# Patient Record
Sex: FEMALE | Race: WHITE | Employment: FULL TIME | URBAN - METROPOLITAN AREA
[De-identification: names, ages, dates, MRNs, and addresses within clinical notes are randomized per-mention and may not be internally consistent; named-entity substitution may affect disease eponyms.]

---

## 2021-03-23 ENCOUNTER — TELEPHONE (OUTPATIENT)
Dept: TRANSPLANT | Facility: CLINIC | Age: 46
End: 2021-03-23

## 2021-03-23 DIAGNOSIS — Z00.5 TRANSPLANT DONOR EVALUATION: Primary | ICD-10-CM

## 2021-03-23 NOTE — TELEPHONE ENCOUNTER
Spoke with Zora. Will send her list of local  clinics and admin will sched for Phase 1 labs. Prep instructions given.Orders in Epic.

## 2021-04-04 ENCOUNTER — HEALTH MAINTENANCE LETTER (OUTPATIENT)
Age: 46
End: 2021-04-04

## 2021-04-09 ENCOUNTER — ALLIED HEALTH/NURSE VISIT (OUTPATIENT)
Dept: NURSING | Facility: CLINIC | Age: 46
End: 2021-04-09

## 2021-04-09 VITALS
BODY MASS INDEX: 34.12 KG/M2 | HEIGHT: 67 IN | DIASTOLIC BLOOD PRESSURE: 76 MMHG | SYSTOLIC BLOOD PRESSURE: 120 MMHG | WEIGHT: 217.4 LBS

## 2021-04-09 DIAGNOSIS — Z00.5 TRANSPLANT DONOR EVALUATION: Primary | ICD-10-CM

## 2021-04-09 DIAGNOSIS — Z00.5 TRANSPLANT DONOR EVALUATION: ICD-10-CM

## 2021-04-09 LAB
ABO + RH BLD: NORMAL
ABO + RH BLD: NORMAL
ALBUMIN UR-MCNC: NEGATIVE MG/DL
APPEARANCE UR: CLEAR
BACTERIA #/AREA URNS HPF: ABNORMAL /HPF
BILIRUB UR QL STRIP: NEGATIVE
COLOR UR AUTO: YELLOW
CREAT SERPL-MCNC: 0.93 MG/DL (ref 0.52–1.04)
GFR SERPL CREATININE-BSD FRML MDRD: 74 ML/MIN/{1.73_M2}
GLUCOSE SERPL-MCNC: 99 MG/DL (ref 70–99)
GLUCOSE UR STRIP-MCNC: NEGATIVE MG/DL
HGB BLD-MCNC: 13.8 G/DL (ref 11.7–15.7)
HGB UR QL STRIP: NEGATIVE
KETONES UR STRIP-MCNC: NEGATIVE MG/DL
LEUKOCYTE ESTERASE UR QL STRIP: NEGATIVE
MUCOUS THREADS #/AREA URNS LPF: PRESENT /LPF
NITRATE UR QL: NEGATIVE
NON-SQ EPI CELLS #/AREA URNS LPF: ABNORMAL /LPF
PH UR STRIP: 6.5 PH (ref 5–7)
PROT UR-MCNC: 0.12 G/L
PROT/CREAT 24H UR: 0.07 G/G CR (ref 0–0.2)
RBC #/AREA URNS AUTO: ABNORMAL /HPF
SOURCE: ABNORMAL
SP GR UR STRIP: 1.02 (ref 1–1.03)
SPECIMEN EXP DATE BLD: NORMAL
UROBILINOGEN UR STRIP-ACNC: 0.2 EU/DL (ref 0.2–1)
WBC #/AREA URNS AUTO: ABNORMAL /HPF

## 2021-04-09 PROCEDURE — 85018 HEMOGLOBIN: CPT | Performed by: SURGERY

## 2021-04-09 PROCEDURE — 81001 URINALYSIS AUTO W/SCOPE: CPT | Performed by: SURGERY

## 2021-04-09 PROCEDURE — 86900 BLOOD TYPING SEROLOGIC ABO: CPT | Performed by: SURGERY

## 2021-04-09 PROCEDURE — 82043 UR ALBUMIN QUANTITATIVE: CPT | Performed by: SURGERY

## 2021-04-09 PROCEDURE — 82947 ASSAY GLUCOSE BLOOD QUANT: CPT | Performed by: SURGERY

## 2021-04-09 PROCEDURE — 84156 ASSAY OF PROTEIN URINE: CPT | Performed by: SURGERY

## 2021-04-09 PROCEDURE — 82565 ASSAY OF CREATININE: CPT | Performed by: SURGERY

## 2021-04-09 PROCEDURE — 36415 COLL VENOUS BLD VENIPUNCTURE: CPT | Performed by: SURGERY

## 2021-04-09 ASSESSMENT — MIFFLIN-ST. JEOR: SCORE: 1658.75

## 2021-04-09 NOTE — NURSING NOTE
I recorded a height, weight and three blood pressures 15 minutes apart.  I validated with the patient they have already had their lab appointment, have one today or one in the future. Loretta White RN

## 2021-04-10 LAB
CREAT UR-MCNC: 178 MG/DL
MICROALBUMIN UR-MCNC: 6 MG/L
MICROALBUMIN/CREAT UR: 3.53 MG/G CR (ref 0–25)

## 2021-04-19 ENCOUNTER — TELEPHONE (OUTPATIENT)
Dept: TRANSPLANT | Facility: CLINIC | Age: 46
End: 2021-04-19

## 2021-04-19 NOTE — TELEPHONE ENCOUNTER
Please sched for kidney donor eval for 5/13/21 slot 1. Is direct/Deshaun W is coordinator. Sched Iohexol and eval labs for 5/13.Will do COVID test at outside facility. Has Michael Beltran put in eval orders.

## 2021-05-06 ENCOUNTER — TRANSFERRED RECORDS (OUTPATIENT)
Dept: HEALTH INFORMATION MANAGEMENT | Facility: CLINIC | Age: 46
End: 2021-05-06

## 2021-05-11 DIAGNOSIS — Z00.5 TRANSPLANT DONOR EVALUATION: ICD-10-CM

## 2021-05-11 LAB
LABORATORY COMMENT REPORT: NORMAL
SARS-COV-2 RNA RESP QL NAA+PROBE: NEGATIVE
SARS-COV-2 RNA RESP QL NAA+PROBE: NORMAL
SPECIMEN SOURCE: NORMAL
SPECIMEN SOURCE: NORMAL

## 2021-05-11 PROCEDURE — U0003 INFECTIOUS AGENT DETECTION BY NUCLEIC ACID (DNA OR RNA); SEVERE ACUTE RESPIRATORY SYNDROME CORONAVIRUS 2 (SARS-COV-2) (CORONAVIRUS DISEASE [COVID-19]), AMPLIFIED PROBE TECHNIQUE, MAKING USE OF HIGH THROUGHPUT TECHNOLOGIES AS DESCRIBED BY CMS-2020-01-R: HCPCS | Performed by: SURGERY

## 2021-05-11 PROCEDURE — U0005 INFEC AGEN DETEC AMPLI PROBE: HCPCS | Performed by: SURGERY

## 2021-05-11 NOTE — PROGRESS NOTES
Psychosocial Evaluation  Living Organ Donation per OPTN Policy 14.1.A  Organ Type: related, kidney  Presenting Information:  Ms. Zora López presents to the [facility] to complete a psychosocial evaluation since she is interested in becoming a kidney donor for her father, Mr. Yaron Hodge, age 71.    PERSONAL BACKGROUND:  Current Living Situation: Ms. López lives in [place], [place] with her spouse and their two children.    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  She earned her graduate degree.  She denies any financial concerns related to kidney donation.    Health Insurance Status: Ms. López has health care insurance.    Family History: Ms. López is .  Her spouse is Rodo López.  They have 2 sons, ages 14 and 8 years old.      General Health: Ms. López reports that she enjoys good health.  She exercises about 1 time per week. She used to be a runner.  She does have a health care directive.    Mental Health: Denies any past or present treatment for mental health issues.  Denies any need to see a counselor or therapist.  Denies any past suicidal ideation, plans, or past attempts.  Denies any use of psychotropic medications.  Denies any past history of hospitalization for psychiatric illness.    Alcohol and Drug Use/Abuse/Dependency: Denies any past history of abuse or dependency on alcohol or illicit drugs. Denies any current use of street drugs, including marijuana.  Denies any past history of negative consequences of use of alcohol or drugs such as a DUI, relationship problems, or problems with work or home life.   Typically, she drinks 3 to 4 glasses wine or beer per week.    Cigarette Use: Ms. López denies any smoking history.    Legal: Ms. López denies any legal issues.    Coping with major surgery/associated stress: Ms. López says  that she is prone to worry and she deals with it by making lists.  She looks at what she needs to do and starts moving down the list.  She is a planner.  For fun, she likes to travel and spend time with her family, shop, camp, and spend time at the cabin with her parents.    Support System: Her main support is her , who is hear today.  She is also very close to her parents and has many supportive friends.    DONOR SPECIFIC INFORMATION:  Relationship to Recipient: Ms. López wants to donate a kidney to her father, Mr. Yaron Hodge, age 71.    Decision Process/Motivation to Donate: Ms. López tells me that she is motivated to donate a kidney to her father out of love for him and concern for his health.  She wants to see him live a better quality of life free from dialysis.  She wants to see him enjoy time with his grandchildren.  She denies feeling any pressure or coercion to be a donor.  She denies being offered any form of payment to be a donor.    PREPARATION FOR DONATION, RECOVERY, AND POTENTIAL SHORT-LONG-TERM OUTCOMES:  Understanding of the Living Donation Process:   We discussed the role of the donor  and Independent Donor Advocate.  Short and long term medical and psychosocial risks to both, donor and recipient were reviewed and Ms. López voices these risks.  High risk behaviors as defined by [place] Public Health Services (PHS) that have potential to increase risk of disease transmission were reviewed and she denies any high risk behaviors. Post surgical restrictions (2 weeks no driving, 6 weeks no lifting over 10 lbs) were reviewed and she appears capable of adhering to the post surgical requirements. The need for a caregiver was discussed and her  will provide care to her .  The risk of poor psychosocial outcome including problems with body image, post-surgery depression or anxiety, or feelings of emotional distress or bereavement if recipient experiences any recurrent disease,  poor outcome or death was reviewed.  Additionally, potential financial implications, including the risk of having difficulty obtaining health care insurance, life insurance, disability insurance, or long term care insurance were reviewed, as were available donor grants to assist with donor related expenses.      We also discussed some unique issues that arise with paired kidney donation, which include the uncertainty of the timing and the importance of having a employment situation and support system that is able to provide sustained support and flexibility.    Ms. López appears capable of understanding this information and making an informed medical decision.    Impressions/Recommendations:   Ms. López  is highly motivated to donate kidney  to her father, Mr. Yaron Hodge, age 71.  Her decision to donate is free of inducement, coercion, or other undue pressure.   Her housing, finances and employment are stable.  No current/active mental health or chemical abuse issues were identified.  The need for a caregiver was reviewed and she is able to identify a plan to meet her post operative care needs.  Ms. López appears capable of making an informed medical decision.  No psychosocial contraindications to living organ donation were identified and  I support Ms. López s desire to donate a kidney to her father, Mr. Yaron Hodge, age 71.         Contact Information:   ARTEMIO Delgadillo, [facility]  Clinical  and Independent Donor Advocate  [facility]  Pager:  [phone]  Direct:  [phone]    Time Spent: 60 minutes      Living Kidney Donor Consent per OPTN Policy 14.3.A for Independent Living Donor Advocate (ASYA)    Written assurance has been obtained from the potential donor that he/she:   Is willing to donate  Is free from inducement and coercion  Has been informed that the he/she may decline to donate at any time  Has been informed that transplant  centers must:   A) Offer donors an opportunity to discontinue the donor consent or evaluation process in a way that is protected and confidential  B) Provide an independent living donor advocate (ASYA) to assist the potential donor during this process    The following was presented to the potential donor in a language in which the potential donor is able to engage in meaningful dialogue:   Education and instruction about all phases of the living donation process including:   Consent  Medical and psychosocial evaluation  Information about the surgical procedure  Pre and post operative care  Benefits of post operative follow up  Disclosure that the [facility] will take all reasonable precautions to provide confidentiality for the donor/recipient  Disclosure that it is a federal crime for any person to knowingly acquire, obtain or otherwise transfer any human organ for valuable consideration  Disclosure that the [facility] must provide an independent living donor advocate (ASYA)  Disclosure that health information obtained during the evaluation is subject to the same regulations as all records and could reveal conditions that must be reported to local, state, or federal public health authorities  Disclosure that the [facility] is required to report living donor follow up information at 6 months, 1 year, and 2 years, and that the potential donor must commit to post operative follow up testing coordinated by the [facility]    Disclosure has been provided that these risks may be transient or permanent & include but are not limited to:  Potential psychosocial risks:  Problems with body image  Post-surgery depression or anxiety  Feelings of emotional distress or bereavement if recipient experiences any recurrent disease or in the event of the recipient s death  Impact of donation on the donor s lifestyle    Potential financial impacts:  Personal expenses of travel, housing, , lost wages  related to donation might not be reimbursed. However, resources may be available to defray some donation-related expenses   Need for life-long follow up at the donor s expense  Loss of employment or income  Negative impact on the ability to obtain future employment  Negative impact on the ability to obtain, maintain, or afford health, disability, and life insurance  Future health problems experienced by living donors following donation may not be covered by the recipient s insurance    Contact Information:  ARTEMIO Delgadillo, [facility]  Clinical  and Independent Donor Advocate  [facility]  Pager:  [phone]  Direct:  [phone]    Time Spent: 60 minutes

## 2021-05-12 ENCOUNTER — TRANSFERRED RECORDS (OUTPATIENT)
Dept: HEALTH INFORMATION MANAGEMENT | Facility: CLINIC | Age: 46
End: 2021-05-12

## 2021-05-13 ENCOUNTER — ANCILLARY PROCEDURE (OUTPATIENT)
Dept: GENERAL RADIOLOGY | Facility: CLINIC | Age: 46
End: 2021-05-13
Attending: SURGERY

## 2021-05-13 ENCOUNTER — APPOINTMENT (OUTPATIENT)
Dept: TRANSPLANT | Facility: CLINIC | Age: 46
End: 2021-05-13
Attending: SURGERY

## 2021-05-13 ENCOUNTER — OFFICE VISIT (OUTPATIENT)
Dept: TRANSPLANT | Facility: CLINIC | Age: 46
End: 2021-05-13
Attending: SURGERY
Payer: COMMERCIAL

## 2021-05-13 ENCOUNTER — OFFICE VISIT (OUTPATIENT)
Dept: NEPHROLOGY | Facility: CLINIC | Age: 46
End: 2021-05-13
Attending: SURGERY
Payer: COMMERCIAL

## 2021-05-13 ENCOUNTER — ALLIED HEALTH/NURSE VISIT (OUTPATIENT)
Dept: TRANSPLANT | Facility: CLINIC | Age: 46
End: 2021-05-13
Attending: SURGERY

## 2021-05-13 ENCOUNTER — ANCILLARY PROCEDURE (OUTPATIENT)
Dept: CT IMAGING | Facility: CLINIC | Age: 46
End: 2021-05-13
Attending: SURGERY
Payer: COMMERCIAL

## 2021-05-13 ENCOUNTER — DOCUMENTATION ONLY (OUTPATIENT)
Dept: TRANSPLANT | Facility: CLINIC | Age: 46
End: 2021-05-13

## 2021-05-13 ENCOUNTER — INFUSION THERAPY VISIT (OUTPATIENT)
Dept: INFUSION THERAPY | Facility: CLINIC | Age: 46
End: 2021-05-13
Attending: INTERNAL MEDICINE

## 2021-05-13 VITALS
BODY MASS INDEX: 32.57 KG/M2 | OXYGEN SATURATION: 100 % | WEIGHT: 214.9 LBS | DIASTOLIC BLOOD PRESSURE: 78 MMHG | HEART RATE: 86 BPM | SYSTOLIC BLOOD PRESSURE: 113 MMHG | HEIGHT: 68 IN

## 2021-05-13 DIAGNOSIS — Z00.5 TRANSPLANT DONOR EVALUATION: ICD-10-CM

## 2021-05-13 DIAGNOSIS — Z52.4 DONOR OF KIDNEY FOR TRANSPLANT: Primary | ICD-10-CM

## 2021-05-13 DIAGNOSIS — Z00.5 TRANSPLANT DONOR EVALUATION: Primary | ICD-10-CM

## 2021-05-13 DIAGNOSIS — E66.811 OBESITY (BMI 30.0-34.9): ICD-10-CM

## 2021-05-13 LAB
ABO + RH BLD: NORMAL
ABO + RH BLD: NORMAL
ALBUMIN SERPL-MCNC: 3.8 G/DL (ref 3.4–5)
ALP SERPL-CCNC: 81 U/L (ref 40–150)
ALT SERPL W P-5'-P-CCNC: 18 U/L (ref 0–50)
ANION GAP SERPL CALCULATED.3IONS-SCNC: 3 MMOL/L (ref 3–14)
APTT PPP: 32 SEC (ref 22–37)
AST SERPL W P-5'-P-CCNC: 14 U/L (ref 0–45)
BILIRUB DIRECT SERPL-MCNC: 0.2 MG/DL (ref 0–0.2)
BILIRUB SERPL-MCNC: 0.7 MG/DL (ref 0.2–1.3)
BLD GP AB SCN SERPL QL: NORMAL
BLOOD BANK CMNT PATIENT-IMP: NORMAL
BUN SERPL-MCNC: 12 MG/DL (ref 7–30)
CALCIUM SERPL-MCNC: 8.6 MG/DL (ref 8.5–10.1)
CHLORIDE SERPL-SCNC: 107 MMOL/L (ref 94–109)
CHOLEST SERPL-MCNC: 193 MG/DL
CO2 SERPL-SCNC: 30 MMOL/L (ref 20–32)
CREAT SERPL-MCNC: 0.88 MG/DL (ref 0.52–1.04)
ERYTHROCYTE [DISTWIDTH] IN BLOOD BY AUTOMATED COUNT: 11.9 % (ref 10–15)
GFR SERPL CREATININE-BSD FRML MDRD: 79 ML/MIN/{1.73_M2}
GLUCOSE SERPL-MCNC: 96 MG/DL (ref 70–99)
HBA1C MFR BLD: 5.2 % (ref 0–5.6)
HBV CORE AB SERPL QL IA: NONREACTIVE
HBV SURFACE AB SERPL IA-ACNC: 0 M[IU]/ML
HBV SURFACE AG SERPL QL IA: NONREACTIVE
HCT VFR BLD AUTO: 42.9 % (ref 35–47)
HCV AB SERPL QL IA: NONREACTIVE
HDLC SERPL-MCNC: 58 MG/DL
HGB BLD-MCNC: 14.1 G/DL (ref 11.7–15.7)
HIV 1+2 AB+HIV1 P24 AG SERPL QL IA: NONREACTIVE
INR PPP: 1 (ref 0.86–1.14)
LDLC SERPL CALC-MCNC: 120 MG/DL
MCH RBC QN AUTO: 30.6 PG (ref 26.5–33)
MCHC RBC AUTO-ENTMCNC: 32.9 G/DL (ref 31.5–36.5)
MCV RBC AUTO: 93 FL (ref 78–100)
NONHDLC SERPL-MCNC: 135 MG/DL
PHOSPHATE SERPL-MCNC: 2.7 MG/DL (ref 2.5–4.5)
PLATELET # BLD AUTO: 258 10E9/L (ref 150–450)
POTASSIUM SERPL-SCNC: 4.1 MMOL/L (ref 3.4–5.3)
PROT SERPL-MCNC: 7.7 G/DL (ref 6.8–8.8)
RBC # BLD AUTO: 4.61 10E12/L (ref 3.8–5.2)
SODIUM SERPL-SCNC: 140 MMOL/L (ref 133–144)
SPECIMEN EXP DATE BLD: NORMAL
T PALLIDUM AB SER QL: NONREACTIVE
TRIGL SERPL-MCNC: 77 MG/DL
WBC # BLD AUTO: 8.7 10E9/L (ref 4–11)

## 2021-05-13 PROCEDURE — G0463 HOSPITAL OUTPT CLINIC VISIT: HCPCS | Mod: 25,27

## 2021-05-13 PROCEDURE — 36415 COLL VENOUS BLD VENIPUNCTURE: CPT | Performed by: PATHOLOGY

## 2021-05-13 PROCEDURE — 86850 RBC ANTIBODY SCREEN: CPT | Performed by: PATHOLOGY

## 2021-05-13 PROCEDURE — 99203 OFFICE O/P NEW LOW 30 MIN: CPT | Performed by: TRANSPLANT SURGERY

## 2021-05-13 PROCEDURE — 80061 LIPID PANEL: CPT | Performed by: PATHOLOGY

## 2021-05-13 PROCEDURE — 84100 ASSAY OF PHOSPHORUS: CPT | Performed by: PATHOLOGY

## 2021-05-13 PROCEDURE — 85027 COMPLETE CBC AUTOMATED: CPT | Performed by: PATHOLOGY

## 2021-05-13 PROCEDURE — 96374 THER/PROPH/DIAG INJ IV PUSH: CPT

## 2021-05-13 PROCEDURE — 80053 COMPREHEN METABOLIC PANEL: CPT | Performed by: PATHOLOGY

## 2021-05-13 PROCEDURE — 86803 HEPATITIS C AB TEST: CPT | Mod: 90 | Performed by: PATHOLOGY

## 2021-05-13 PROCEDURE — 86900 BLOOD TYPING SEROLOGIC ABO: CPT | Performed by: PATHOLOGY

## 2021-05-13 PROCEDURE — 87340 HEPATITIS B SURFACE AG IA: CPT | Mod: 90 | Performed by: PATHOLOGY

## 2021-05-13 PROCEDURE — 86704 HEP B CORE ANTIBODY TOTAL: CPT | Mod: 90 | Performed by: PATHOLOGY

## 2021-05-13 PROCEDURE — 83036 HEMOGLOBIN GLYCOSYLATED A1C: CPT | Performed by: PATHOLOGY

## 2021-05-13 PROCEDURE — 82542 COL CHROMOTOGRAPHY QUAL/QUAN: CPT | Performed by: INTERNAL MEDICINE

## 2021-05-13 PROCEDURE — 84550 ASSAY OF BLOOD/URIC ACID: CPT | Performed by: PATHOLOGY

## 2021-05-13 PROCEDURE — 86665 EPSTEIN-BARR CAPSID VCA: CPT | Mod: 90 | Performed by: PATHOLOGY

## 2021-05-13 PROCEDURE — 86780 TREPONEMA PALLIDUM: CPT | Mod: 90 | Performed by: PATHOLOGY

## 2021-05-13 PROCEDURE — 82248 BILIRUBIN DIRECT: CPT | Performed by: PATHOLOGY

## 2021-05-13 PROCEDURE — 250N000011 HC RX IP 250 OP 636: Mod: JW | Performed by: INTERNAL MEDICINE

## 2021-05-13 PROCEDURE — 85610 PROTHROMBIN TIME: CPT | Performed by: PATHOLOGY

## 2021-05-13 PROCEDURE — 85730 THROMBOPLASTIN TIME PARTIAL: CPT | Performed by: PATHOLOGY

## 2021-05-13 PROCEDURE — 74175 CTA ABDOMEN W/CONTRAST: CPT | Mod: GC | Performed by: RADIOLOGY

## 2021-05-13 PROCEDURE — 71046 X-RAY EXAM CHEST 2 VIEWS: CPT | Mod: GC | Performed by: RADIOLOGY

## 2021-05-13 PROCEDURE — 86706 HEP B SURFACE ANTIBODY: CPT | Mod: 90 | Performed by: PATHOLOGY

## 2021-05-13 PROCEDURE — 99245 OFF/OP CONSLTJ NEW/EST HI 55: CPT

## 2021-05-13 PROCEDURE — 86901 BLOOD TYPING SEROLOGIC RH(D): CPT | Performed by: PATHOLOGY

## 2021-05-13 PROCEDURE — 86644 CMV ANTIBODY: CPT | Mod: 90 | Performed by: PATHOLOGY

## 2021-05-13 PROCEDURE — G0463 HOSPITAL OUTPT CLINIC VISIT: HCPCS | Mod: 25

## 2021-05-13 RX ORDER — IOPAMIDOL 755 MG/ML
100 INJECTION, SOLUTION INTRAVASCULAR ONCE
Status: COMPLETED | OUTPATIENT
Start: 2021-05-13 | End: 2021-05-13

## 2021-05-13 RX ADMIN — IOHEXOL 5 ML: 300 INJECTION, SOLUTION INTRAVENOUS at 07:22

## 2021-05-13 RX ADMIN — IOPAMIDOL 100 ML: 755 INJECTION, SOLUTION INTRAVASCULAR at 13:15

## 2021-05-13 ASSESSMENT — MIFFLIN-ST. JEOR: SCORE: 1655.34

## 2021-05-13 NOTE — LETTER
5/13/2021       RE: Zora López  [address] [place] [address]     Dear Colleague,    Thank you for referring your patient, Zora López, to the [facility] [place] at [facility]. Please see a copy of my visit note below.    TRANSPLANT NEPHROLOGY DONOR EVALUATION    Assessment and Plan:  # Prospective Kidney Transplant Donor: Patient with one issue that needs to be addressed prior to donation. Patient's blood pressure is acceptable at this visit, kidney function appears to be acceptable with Iohexol pending, and urinalysis is bland.    # Overweight: Patient has a BMI of 33.2 with some central obesity and would recommend weight loss to decrease surgical risk and for their overall health.  Will defer to Transplant Surgery on any weight loss goal and acceptability for donation.    Discussed the risks of donating a kidney, including the surgical risk and the possible risks of living with one kidney.    Education about expected post-donation kidney function and how chronic kidney disease (CKD) and end stage kidney disease (ESKD) might potentially impact the donor in the future, include, but not limited to:       - On average, donors will have 25-35% permanent loss of kidney function at donation.       - Baseline risk of ESKD may slightly exceed that of members of the general population with the same demographic profile.       - Donor risks must be interpreted in light of known epidemiology of both CKD or ESKD, such as that CKD generally develops in midlife (40-50 years old) and ESKD generally develops after age 60.       - Medical evaluation of young potential donors cannot predict lifetime risk of CKD or ESKD.       - Donors may be at higher risk for CKD if they sustain damage to the remaining kidney.       - Development of CKD and progression of ESKD may be more rapid with only 1 kidney.       - Some type of kidney  replacement therapy, either kidney transplant or dialysis, is required when reaching ESKD.    Potential medical or surgical risks include, but not limited to:       - Death.       - Scars, pain, fatigue, and other consequences typical of any surgical procedure.       - Decreased kidney function.       - Abdominal or bowel symptoms, such as bloating and nausea, and developing bowel obstruction.       - Kidney failure (ESKD) and the need for a kidney transplant or dialysis for the donor.       - Impact of obesity, hypertension, or other donor-specific medical conditions on morbidity and mortality of the potential donor.    Patients overall evaluation will be discussed with the transplant team and a final recommendation on the patients' suitability to be a kidney transplant donor will be made at that time.    Consult:  Zora López was seen in consultation at the request of Dr. Florence King for evaluation as a potential kidney transplant donor.    Reason for Visit:  Zora López is a 46 year old female who presents for a kidney donor evaluation.  Patient would like to donate to Yaron Carrillo, patient's father.    HPI:    Patient reports feeling good overall with no significant medical complaints.  Her energy level is good and she remains active, although has been getting only minimal exercise over the last year.  A couple of years ago patient was running for exercise and did a half marathon, but then developed plantar fasciitis and stopped running.  She did walk about 5 miles a day last summer, but not much over the winter.  With decreased exercise, patient has gained about 40-50 lbs.  Denies any chest pain or shortness of breath with exertion.    Appetite is good with no nausea, vomiting or diarrhea.  No fever, sweats or chills.  No leg swelling.  No pain or burning with urination.  Patient did have COVID infection last year, but no symptoms now.    She does report having an abnormal mammogram  this year, but a follow up ultrasound was reportedly normal.         Kidney Disease Hx:       h/o Kidney Problems: No  Family h/o Genetic Kidney Disease: No       h/o HTN: No    Usual BP: Doesn't know       h/o Protein in Urine: No  h/o Blood in Urine: No       h/o Kidney Stones: No  h/o Kidney Injury: No       h/o Recurrent UTI: No  h/o Genitourinary Problems: No       h/o Chronic NSAID Use: No         Other Medical Hx:       h/o DM: No             h/o Gastrointestinal, Pancreas or Liver Problems: No       h/o Lung or Heart Problems: No       h/o Hematologic Problems: No  h/o Bleeding or Clotting Problems: No       h/o Cancer: No       h/o Infection Problems: No       h/o Gestational DM: No      h/o Preeclampsia: No         Skin Cancer Risk: Patient follows regularly with Dermatology with no h/o skin cancer       h/o more than 50 moles: Yes        h/o extensive sun exposure: No       h/o melanoma: No       Family h/o melanoma: Yes: Mother         Mental Health Assessment:       h/o Depression: No       h/o Psychiatric Illness: No       h/o Suicidal Attempt(s): No    Review Of Systems:   A comprehensive review of systems was obtained and negative, except as noted in the HPI or PMH.    Past Medical History:   History was taken from the patient as noted below.  Past Medical History:   Diagnosis Date     Miscarriage     x2 in the first trimester     Plantar fasciitis        Past Social History:   Past Surgical History:   Procedure Laterality Date     APPENDECTOMY       Personal or family history of anesthesia problems: No    Family History:   Family History   Problem Relation Age of Onset     Melanoma Mother      Diabetes Father      Hypertension Father      Kidney Disease Father      Diabetes Brother      Myocardial Infarction Paternal Grandfather      Leukemia Maternal Grandmother           Specific Family History:       FH of DM: Yes        FH of CAD: No  FH of HTN: Yes        FH of Cancer: Yes   FH of Kidney  Cancer: No    Personal History:   Social History     Socioeconomic History     Marital status:      Spouse name: Not on file     Number of children: 2     Years of education: Not on file     Highest education level: Not on file   Occupational History     Not on file   Social Needs     Financial resource strain: Not on file     Food insecurity     Worry: Not on file     Inability: Not on file     Transportation needs     Medical: Not on file     Non-medical: Not on file   Tobacco Use     Smoking status: Never Smoker     Smokeless tobacco: Never Used   Substance and Sexual Activity     Alcohol use: Yes     Alcohol/week: 3.0 - 4.0 standard drinks     Types: 3 - 4 Standard drinks or equivalent per week     Frequency: 2-4 times a month     Drug use: Never     Sexual activity: Not on file   Lifestyle     Physical activity     Days per week: Not on file     Minutes per session: Not on file     Stress: Not on file   Relationships     Social connections     Talks on phone: Not on file     Gets together: Not on file     Attends Mu-ism service: Not on file     Active member of club or organization: Not on file     Attends meetings of clubs or organizations: Not on file     Relationship status: Not on file     Intimate partner violence     Fear of current or ex partner: Not on file     Emotionally abused: Not on file     Physically abused: Not on file     Forced sexual activity: Not on file   Other Topics Concern     Not on file   Social History Narrative     Not on file          Specific Social History:       Health Insurance Status: Yes       Employment Status: Full time  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                       Living Arrangements: lives with their spouse       Social Support: Yes       Presence of increased risk for disease transmission behaviors as defined by PHS guidelines: No        Allergies:  No Known Allergies    Medications:  Prior to Admission medications   "  Not on File       Vitals:  Vital Signs 5/13/2021 5/13/2021 5/13/2021   Systolic 115 111 113   Diastolic 81 76 78   Pulse 86 - -   Weight (LB) 214 lb 14.4 oz - -   Height 5' 7.5\" - -   BMI (Calculated) 33.16 - -   O2 100 - -       Exam:   GENERAL APPEARANCE: alert and no distress  HENT: mouth without ulcers or lesions  LYMPHATICS: no cervical or supraclavicular nodes  RESP: lungs clear to auscultation - no rales, rhonchi or wheezes  CV: regular rhythm, normal rate, no rub, no murmur  EDEMA: no LE edema bilaterally  ABDOMEN: soft, nondistended, nontender, bowel sounds normal  MS: extremities normal - no gross deformities noted, no evidence of inflammation in joints, no muscle tenderness  SKIN: no rash    Results:   Labs and imaging were ordered for this visit and reviewed by me.  Recent Results (from the past 336 hour(s))   Asymptomatic COVID-19 Virus (Coronavirus) by PCR    Collection Time: 05/11/21  7:30 AM    Specimen: Nasopharyngeal   Result Value Ref Range    COVID-19 Virus PCR to [facility] - Source Nasopharyngeal     COVID-19 Virus PCR to [facility] - Result       Test received-See reflex to IDDL test SARS CoV2 (COVID-19) Virus RT-PCR   SARS-CoV-2 COVID-19 Virus (Coronavirus) by PCR    Collection Time: 05/11/21  7:30 AM    Specimen: Nasopharyngeal   Result Value Ref Range    SARS-CoV-2 Virus Specimen Source Nasopharyngeal     SARS-CoV-2 PCR Result NEGATIVE     SARS-CoV-2 PCR Comment       Testing was performed using the Aptima SARS-CoV-2 Assay on the [employer] Instrument System.   Additional information about this Emergency Use Authorization (EUA) assay can be found via   the Lab Guide.     ABO/Rh type and screen    Collection Time: 05/13/21  6:36 AM   Result Value Ref Range    ABO O     RH(D) Pos     Antibody Screen Neg     Test Valid Only At          [facility]    Specimen Expires 05/16/2021    Lipid Profile    Collection Time: 05/13/21  6:36 AM   Result Value Ref Range "    Cholesterol 193 <200 mg/dL    Triglycerides 77 <150 mg/dL    HDL Cholesterol 58 >49 mg/dL    LDL Cholesterol Calculated 120 (H) <100 mg/dL    Non HDL Cholesterol 135 (H) <130 mg/dL   Comprehensive metabolic panel    Collection Time: 05/13/21  6:36 AM   Result Value Ref Range    Sodium 140 133 - 144 mmol/L    Potassium 4.1 3.4 - 5.3 mmol/L    Chloride 107 94 - 109 mmol/L    Carbon Dioxide 30 20 - 32 mmol/L    Anion Gap 3 3 - 14 mmol/L    Glucose 96 70 - 99 mg/dL    Urea Nitrogen 12 7 - 30 mg/dL    Creatinine 0.88 0.52 - 1.04 mg/dL    GFR Estimate 79 >60 mL/min/[1.73_m2]    GFR Estimate If Black >90 >60 mL/min/[1.73_m2]    Calcium 8.6 8.5 - 10.1 mg/dL    Bilirubin Total 0.7 0.2 - 1.3 mg/dL    Albumin 3.8 3.4 - 5.0 g/dL    Protein Total 7.7 6.8 - 8.8 g/dL    Alkaline Phosphatase 81 40 - 150 U/L    ALT 18 0 - 50 U/L    AST 14 0 - 45 U/L   Hemoglobin A1c    Collection Time: 05/13/21  6:36 AM   Result Value Ref Range    Hemoglobin A1C 5.2 0 - 5.6 %   Phosphorus    Collection Time: 05/13/21  6:36 AM   Result Value Ref Range    Phosphorus 2.7 2.5 - 4.5 mg/dL   CMV Antibody IgG    Collection Time: 05/13/21  6:36 AM   Result Value Ref Range    CMV Antibody IgG 0.3 0.0 - 0.8 AI   EBV Capsid Antibody IgG    Collection Time: 05/13/21  6:36 AM   Result Value Ref Range    EBV Capsid Antibody IgG <0.2 0.0 - 0.8 AI   Hepatitis B core antibody    Collection Time: 05/13/21  6:36 AM   Result Value Ref Range    Hepatitis B Core Julia Nonreactive NR^Nonreactive   Hepatitis B Surface Antibody    Collection Time: 05/13/21  6:36 AM   Result Value Ref Range    Hepatitis B Surface Antibody 0.00 <8.00 m[IU]/mL   Hepatitis B surface antigen    Collection Time: 05/13/21  6:36 AM   Result Value Ref Range    Hep B Surface Agn Nonreactive NR^Nonreactive   Hepatitis C antibody    Collection Time: 05/13/21  6:36 AM   Result Value Ref Range    Hepatitis C Antibody Nonreactive NR^Nonreactive   HIV Antigen Antibody Combo Pretransplant    Collection  Time: 05/13/21  6:36 AM   Result Value Ref Range    HIV Antigen Antibody Combo Pretransplant Nonreactive NR^Nonreactive   Treponema Abs w Reflex to RPR and Titer    Collection Time: 05/13/21  6:36 AM   Result Value Ref Range    Treponema Antibodies Nonreactive NR^Nonreactive   INR    Collection Time: 05/13/21  6:36 AM   Result Value Ref Range    INR 1.00 0.86 - 1.14   Partial thromboplastin time    Collection Time: 05/13/21  6:36 AM   Result Value Ref Range    PTT 32 22 - 37 sec   CBC with platelets    Collection Time: 05/13/21  6:36 AM   Result Value Ref Range    WBC 8.7 4.0 - 11.0 10e9/L    RBC Count 4.61 3.8 - 5.2 10e12/L    Hemoglobin 14.1 11.7 - 15.7 g/dL    Hematocrit 42.9 35.0 - 47.0 %    MCV 93 78 - 100 fl    MCH 30.6 26.5 - 33.0 pg    MCHC 32.9 31.5 - 36.5 g/dL    RDW 11.9 10.0 - 15.0 %    Platelet Count 258 150 - 450 10e9/L   Bilirubin direct    Collection Time: 05/13/21  6:36 AM   Result Value Ref Range    Bilirubin Direct 0.2 0.0 - 0.2 mg/dL   EKG 12-lead complete w/read - Clinics    Collection Time: 05/13/21  1:18 PM   Result Value Ref Range    Interpretation ECG Click View Image link to view waveform and result                Again, thank you for allowing me to participate in the care of your patient.      Sincerely,    Jaspal Roque MD

## 2021-05-13 NOTE — PROGRESS NOTES
Aleksandr Blakely in clinic on 5/13/21 for Living Kidney Donor Evaluation.  I provided a folder which included copies of the following:  Living Kidney Donor Evaluation Consent,  Paired Exchange Consent, Donor Shield Pamphlet, Living Donor Collective Study information, Better Matches Better Outcomes Pamphlet,  Kidney Donors are Heroes! Study synopsis.   I reviewed the Living Kidney Donor Evaluation Consent, dated 7-6-2020 and the Paired Exchange/ NDD consent dated 9-.  I answered any question.  I thanked them for for coming.  Tissue typing orders placed and message routed to send kit.

## 2021-05-13 NOTE — PROGRESS NOTES
[facility] Solid Organ Transplant  Outpatient MNT: Kidney Donor Evaluation    Current BMI: 33.2 (HT 67.5 in,  lbs/98 kg)  Weight loss pending surgeon discretion  A BMI of 30 would = 193 lbs    8 Year Estimated Risk of T2DM  </= 3%     Time Spent: 15 minutes  Visit Type: Initial   Referring Physician: Daryl   Pt accompanied by: her , Rodo    Nutrition Assessment  Vitamins, Supplements, Pertinent Meds: MVI, calcium, vit D  Herbal Medicines/Supplements: elderberry x 2 years, airborne    Weight hx: 2 years ago gained 40-45 lbs d/t reduced activity; stable wt for ~1 year     Food Security: any concerns about having enough money to buy food or access to grocery stores? No     Diet Recall  Breakfast WW waffle with light cream cheese; weekends may have scr eggs    Lunch Greek yogurt w/ granola and a turkey s/w     Dinner Meat (beef, pork, chicken) with a veggie (corn, green beans, peas, broccoli, asparagus, brussels) + brown rice or cous cous; tacos with yellow rice     Snacks AM- banana, pallavi; triscuits   Beverages Tea, Diet Coke (32 oz/day), water   Alcohol 3-4 drinks/week    Dining out 1-2x/week ([place], [place], [place]to)     Physical Activity  Prev marathon runner, strength training until 2 years ago until she developed plantar fasciitis   March-Nov 2020 walking 5 miles/day 5x/week + pilates    Labs  Recent Labs   Lab Test 05/13/21  0636   CHOL 193   HDL 58   *   TRIG 77       FBG = 96  A1c = 5.2  BP = wnl x 3     Prediction of Incident Diabetes Mellitus in Middle-aged Adults: The [place] Offspring Study  Justen Christy MD; James B. Meigs, MD, MPH; Magaly Christy, PhD; Cassi Love MD, MPH; Shon Ramírez MD; Jaden Villela Sr,   PhD  Pt's estimated risk for T2DM (per Table 6 above)  Pt received points for the following criteria: parental h/o DM (dad), BMI>30  Total points: 8  8-Year estimated risk of T2DM: </= 3%    Nutrition Diagnosis  No nutrition diagnosis  identified at this time.    Nutrition Intervention  Nutrition education provided:  Reviewed overall healthy diet guidelines for pre and post kidney donation. Discussed maintenance of a healthy weight and Na+ intake <3000 mg/day (<2000 mg/day if HTN).    Avoid the following post op d/t unknown effects on the organs:  - Herbal, Chinese, holistic, chiropractic, natural, alternative medicines and supplements  - Detoxes and cleanses  - Weight loss pills  - Protein powders or other products with extracts or herbs (ie green tea extract)    Patient Understanding: Pt verbalized understanding of education provided.  Expected Engagement: Good  Follow-Up Plans: PRN     Nutrition Goals  No nutrition goals identified at this time     Zeina Arora, RD, LD, CCTD

## 2021-05-13 NOTE — PROGRESS NOTES
"47 yo inquiring about donation to 70+ yo father (DM2)    1. No obvious surgical contraindications to donation (without seeing imaging yet)  2. BMI 33, she would benefit from weight loss, but most is likely to be external to abd cavity.  General recommendation to lose weight. (she notes she gained 40 lbs several years ago and hasn't lost it).  3. Would have SW/psych discuss re: possible regret re: donation if father doesn't get sufficient survival benefit.  We discussed at length the \"donor regret or lack of positive attitude\" literature.  She has some unspoken ambivalence/concern re: donation.  4. Prior ileal lymph node resection/appy in college: benign.    HPI; eval for kidney donation.  Considers herself health person.  No HTN, DM or medications.  No underlying medical diagnoses.  She knows she should lose weight, had weight gain several years ago and hasn't lost weight.      No current outpatient medications on file.     No current facility-administered medications for this visit.      Facility-Administered Medications Ordered in Other Visits   Medication     sodium chloride (PF) 0.9% PF flush 5 mL     /78   Pulse 86   Ht 1.715 m (5' 7.5\")   Wt 97.5 kg (214 lb 14.4 oz)   SpO2 100%   Breastfeeding No   BMI 33.16 kg/m    Exam confined to operative site:  Soft, non tender, upper abd appropriate for lap procedure.  Obese  Ext: no edema.  Results for JENNA LOVELL (MRN [id number]) as of 5/13/2021 14:05   Ref. Range 5/13/2021 06:36   Creatinine Latest Ref Range: 0.52 - 1.04 mg/dL 0.88   GFR Estimate Latest Ref Range: >60 mL/min/1.73_m2 79     Results for JENNA LOVELL (MRN [id number]) as of 5/13/2021 14:05   Ref. Range 5/13/2021 06:36   Hemoglobin A1C Latest Ref Range: 0 - 5.6 % 5.2     Results for JENNA LOVELL (MRN [id number]) as of 5/13/2021 14:05   Ref. Range 5/13/2021 06:36   WBC Latest Ref Range: 4.0 - 11.0 10e9/L 8.7   Hemoglobin Latest Ref Range: 11.7 - 15.7 g/dL 14.1 " "  Hematocrit Latest Ref Range: 35.0 - 47.0 % 42.9   Platelet Count Latest Ref Range: 150 - 450 10e9/L 258     UA: benign    We spent in excess of 30 minutes discussing live donation (benefits to recipient and risks to donor) with candidate and her . We discussed kidney quality assessment, risk (inf and tumor), donor risk assessment re: underlying diseases and prior history.  We discussed risk associated with DM acquisition and renal damage; weight and genes. Surgical procedure, risks, hospitalization and expected recovery.     When we spoke about \"regret\" factors: change in relationship with recipient, death, recurrent disease, major unexpected outcomes, she gave considerable pause.  Would highly recommend SW or psych explore further with her, as there is a significant likelihood with a >69 yo man with longstanding DM.  "

## 2021-05-13 NOTE — PROGRESS NOTES
Donor Iohexol test    Zora López presents today to [facility] for a Donor Iohexol test.      Progress note:  ID verified by name and .     The following information was verified with the patient:  Female Patients is there any possibility of being pregnant No  Is there a history of allergy (skin rash, swelling, ect) to:   A.  Iodine (except skin reactions to betadine): No   B. Intravenous radio-contrast agents: No   C. Seafood No     present during visit today: Not Applicable.    R.N. provided patient with educational handout regarding timed test. Yes    Iohexol administered over 2 minutes via butterfly Positive blood return verified before and after injection.  20 gauge PIV placed in LT AC  for blood draws and CT this afternoon.    Medication administered:  Iohexol (Omnipaque 300mg iodine/ml concentration) 5 mls.    Start time: 722  Stop time: 724    Drug Waste Record    Drug Name: Iohexol  Dose: 5 ml  Route administered: IV  NDC #: 6614607026  Amount of waste(mL):5 ml  Reason for waste: Single use vial      Administrations This Visit     iohexol (OMNIPAQUE 300) injection 5 mL     Admin Date  2021 Action  Given Dose  5 mL Route  Intravenous Administered By  Laura Crespo RN              Evaluation nurse in transplant to draw labs at 2 and 4 hours post iohexol administration.  Patient given a slip with the times to get labs drawn and verbalized understanding of the plan.    Patient tolerated the procedure:  yes    After the infusion patient was discharged to the next appointment.    Laura Crespo RN

## 2021-05-13 NOTE — LETTER
Date:June 17, 2021      Patient was self referred, no letter generated. Do not send.        [facility] Health Information

## 2021-05-13 NOTE — LETTER
Date:June 17, 2021      Patient was self referred, no letter generated. Do not send.        [facility] Health Information

## 2021-05-13 NOTE — LETTER
"    5/13/2021         RE: Jenna Lovell  [address] [facility] [address]        Dear Colleague,    Thank you for referring your patient, Jenna Lovell, to the [facility]. Please see a copy of my visit note below.    45 yo inquiring about donation to 70+ yo father (DM2)    1. No obvious surgical contraindications to donation (without seeing imaging yet)  2. BMI 33, she would benefit from weight loss, but most is likely to be external to abd cavity.  General recommendation to lose weight. (she notes she gained 40 lbs several years ago and hasn't lost it).  3. Would have SW/psych discuss re: possible regret re: donation if father doesn't get sufficient survival benefit.  We discussed at length the \"donor regret or lack of positive attitude\" literature.  She has some unspoken ambivalence/concern re: donation.  4. Prior ileal lymph node resection/appy in college: benign.    HPI; eval for kidney donation.  Considers herself health person.  No HTN, DM or medications.  No underlying medical diagnoses.  She knows she should lose weight, had weight gain several years ago and hasn't lost weight.      No current outpatient medications on file.     No current facility-administered medications for this visit.      Facility-Administered Medications Ordered in Other Visits   Medication     sodium chloride (PF) 0.9% PF flush 5 mL     /78   Pulse 86   Ht 1.715 m (5' 7.5\")   Wt 97.5 kg (214 lb 14.4 oz)   SpO2 100%   Breastfeeding No   BMI 33.16 kg/m    Exam confined to operative site:  Soft, non tender, upper abd appropriate for lap procedure.  Obese  Ext: no edema.  Results for JENNA LOVELL (MRN [id number]) as of 5/13/2021 14:05   Ref. Range 5/13/2021 06:36   Creatinine Latest Ref Range: 0.52 - 1.04 mg/dL 0.88   GFR Estimate Latest Ref Range: >60 mL/min/1.73_m2 79     Results for JENNA LOVELL (MRN [id number]) as of 5/13/2021 14:05   Ref. Range 5/13/2021 " "06:36   Hemoglobin A1C Latest Ref Range: 0 - 5.6 % 5.2     Results for JENNA LOVELL (MRN [id number]) as of 5/13/2021 14:05   Ref. Range 5/13/2021 06:36   WBC Latest Ref Range: 4.0 - 11.0 10e9/L 8.7   Hemoglobin Latest Ref Range: 11.7 - 15.7 g/dL 14.1   Hematocrit Latest Ref Range: 35.0 - 47.0 % 42.9   Platelet Count Latest Ref Range: 150 - 450 10e9/L 258     UA: benign    We spent in excess of 30 minutes discussing live donation (benefits to recipient and risks to donor) with candidate and her . We discussed kidney quality assessment, risk (inf and tumor), donor risk assessment re: underlying diseases and prior history.  We discussed risk associated with DM acquisition and renal damage; weight and genes. Surgical procedure, risks, hospitalization and expected recovery.     When we spoke about \"regret\" factors: change in relationship with recipient, death, recurrent disease, major unexpected outcomes, she gave considerable pause.  Would highly recommend SW or psych explore further with her, as there is a significant likelihood with a >71 yo man with longstanding DM.      Again, thank you for allowing me to participate in the care of your patient.        Sincerely,        Hieu Brito MD    "

## 2021-05-13 NOTE — LETTER
2021         RE: Zora López  [address] [facility] [address]        Dear Colleague,    Thank you for referring your patient, Zora López, to the [facility]. Please see a copy of my visit note below.    Donor Iohexol test    Zora López presents today to [facility] for a Donor Iohexol test.      Progress note:  ID verified by name and .     The following information was verified with the patient:  Female Patients is there any possibility of being pregnant No  Is there a history of allergy (skin rash, swelling, ect) to:   A.  Iodine (except skin reactions to betadine): No   B. Intravenous radio-contrast agents: No   C. Seafood No     present during visit today: Not Applicable.    R.N. provided patient with educational handout regarding timed test. Yes    Iohexol administered over 2 minutes via butterfly Positive blood return verified before and after injection.  20 gauge PIV placed in LT AC  for blood draws and CT this afternoon.    Medication administered:  Iohexol (Omnipaque 300mg iodine/ml concentration) 5 mls.    Start time: 722  Stop time: 724    Drug Waste Record    Drug Name: Iohexol  Dose: 5 ml  Route administered: IV  NDC #: 3238730021  Amount of waste(mL):5 ml  Reason for waste: Single use vial      Administrations This Visit     iohexol (OMNIPAQUE 300) injection 5 mL     Admin Date  2021 Action  Given Dose  5 mL Route  Intravenous Administered By  Laura Crespo RN              Evaluation nurse in transplant to draw labs at 2 and 4 hours post iohexol administration.  Patient given a slip with the times to get labs drawn and verbalized understanding of the plan.    Patient tolerated the procedure:  yes    After the infusion patient was discharged to the next appointment.    Laura Crespo RN          Again, thank you for allowing me to participate in the care of your patient.         Sincerely,        [facility]

## 2021-05-14 LAB
CMV IGG SERPL QL IA: 0.3 AI (ref 0–0.8)
EBV VCA IGG SER QL IA: <0.2 AI (ref 0–0.8)
INTERPRETATION ECG - MUSE: NORMAL

## 2021-05-16 SDOH — ECONOMIC STABILITY: TRANSPORTATION INSECURITY
IN THE PAST 12 MONTHS, HAS LACK OF TRANSPORTATION KEPT YOU FROM MEETINGS, WORK, OR FROM GETTING THINGS NEEDED FOR DAILY LIVING?: NOT ASKED

## 2021-05-16 SDOH — ECONOMIC STABILITY: TRANSPORTATION INSECURITY
IN THE PAST 12 MONTHS, HAS THE LACK OF TRANSPORTATION KEPT YOU FROM MEDICAL APPOINTMENTS OR FROM GETTING MEDICATIONS?: NOT ASKED

## 2021-05-16 SDOH — ECONOMIC STABILITY: FOOD INSECURITY: WITHIN THE PAST 12 MONTHS, THE FOOD YOU BOUGHT JUST DIDN'T LAST AND YOU DIDN'T HAVE MONEY TO GET MORE.: NOT ASKED

## 2021-05-16 SDOH — ECONOMIC STABILITY: FOOD INSECURITY: WITHIN THE PAST 12 MONTHS, YOU WORRIED THAT YOUR FOOD WOULD RUN OUT BEFORE YOU GOT MONEY TO BUY MORE.: NOT ASKED

## 2021-05-16 SDOH — ECONOMIC STABILITY: INCOME INSECURITY: HOW HARD IS IT FOR YOU TO PAY FOR THE VERY BASICS LIKE FOOD, HOUSING, MEDICAL CARE, AND HEATING?: NOT ASKED

## 2021-05-16 NOTE — PROGRESS NOTES
TRANSPLANT NEPHROLOGY DONOR EVALUATION    Assessment and Plan:  # Prospective Kidney Transplant Donor: Patient with one issue that needs to be addressed prior to donation. Patient's blood pressure is acceptable at this visit, kidney function appears to be acceptable with Iohexol pending, and urinalysis is bland.    # Overweight: Patient has a BMI of 33.2 with some central obesity and would recommend weight loss to decrease surgical risk and for their overall health.  Will defer to Transplant Surgery on any weight loss goal and acceptability for donation.    Discussed the risks of donating a kidney, including the surgical risk and the possible risks of living with one kidney.    Education about expected post-donation kidney function and how chronic kidney disease (CKD) and end stage kidney disease (ESKD) might potentially impact the donor in the future, include, but not limited to:       - On average, donors will have 25-35% permanent loss of kidney function at donation.       - Baseline risk of ESKD may slightly exceed that of members of the general population with the same demographic profile.       - Donor risks must be interpreted in light of known epidemiology of both CKD or ESKD, such as that CKD generally develops in midlife (40-50 years old) and ESKD generally develops after age 60.       - Medical evaluation of young potential donors cannot predict lifetime risk of CKD or ESKD.       - Donors may be at higher risk for CKD if they sustain damage to the remaining kidney.       - Development of CKD and progression of ESKD may be more rapid with only 1 kidney.       - Some type of kidney replacement therapy, either kidney transplant or dialysis, is required when reaching ESKD.    Potential medical or surgical risks include, but not limited to:       - Death.       - Scars, pain, fatigue, and other consequences typical of any surgical procedure.       - Decreased kidney function.       - Abdominal or bowel  symptoms, such as bloating and nausea, and developing bowel obstruction.       - Kidney failure (ESKD) and the need for a kidney transplant or dialysis for the donor.       - Impact of obesity, hypertension, or other donor-specific medical conditions on morbidity and mortality of the potential donor.    Patients overall evaluation will be discussed with the transplant team and a final recommendation on the patients' suitability to be a kidney transplant donor will be made at that time.    Consult:  Zora López was seen in consultation at the request of Dr. Florence King for evaluation as a potential kidney transplant donor.    Reason for Visit:  Zora López is a 46 year old female who presents for a kidney donor evaluation.  Patient would like to donate to Yaron Carrillo, patient's father.    HPI:    Patient reports feeling good overall with no significant medical complaints.  Her energy level is good and she remains active, although has been getting only minimal exercise over the last year.  A couple of years ago patient was running for exercise and did a half marathon, but then developed plantar fasciitis and stopped running.  She did walk about 5 miles a day last summer, but not much over the winter.  With decreased exercise, patient has gained about 40-50 lbs.  Denies any chest pain or shortness of breath with exertion.    Appetite is good with no nausea, vomiting or diarrhea.  No fever, sweats or chills.  No leg swelling.  No pain or burning with urination.  Patient did have COVID infection last year, but no symptoms now.    She does report having an abnormal mammogram this year, but a follow up ultrasound was reportedly normal.         Kidney Disease Hx:       h/o Kidney Problems: No  Family h/o Genetic Kidney Disease: No       h/o HTN: No    Usual BP: Doesn't know       h/o Protein in Urine: No  h/o Blood in Urine: No       h/o Kidney Stones: No  h/o Kidney Injury: No       h/o Recurrent  UTI: No  h/o Genitourinary Problems: No       h/o Chronic NSAID Use: No         Other Medical Hx:       h/o DM: No             h/o Gastrointestinal, Pancreas or Liver Problems: No       h/o Lung or Heart Problems: No       h/o Hematologic Problems: No  h/o Bleeding or Clotting Problems: No       h/o Cancer: No       h/o Infection Problems: No       h/o Gestational DM: No      h/o Preeclampsia: No         Skin Cancer Risk: Patient follows regularly with Dermatology with no h/o skin cancer       h/o more than 50 moles: Yes        h/o extensive sun exposure: No       h/o melanoma: No       Family h/o melanoma: Yes: Mother         Mental Health Assessment:       h/o Depression: No       h/o Psychiatric Illness: No       h/o Suicidal Attempt(s): No    Review Of Systems:   A comprehensive review of systems was obtained and negative, except as noted in the HPI or PMH.    Past Medical History:   History was taken from the patient as noted below.  Past Medical History:   Diagnosis Date     Miscarriage     x2 in the first trimester     Plantar fasciitis        Past Social History:   Past Surgical History:   Procedure Laterality Date     APPENDECTOMY       Personal or family history of anesthesia problems: No    Family History:   Family History   Problem Relation Age of Onset     Melanoma Mother      Diabetes Father      Hypertension Father      Kidney Disease Father      Diabetes Brother      Myocardial Infarction Paternal Grandfather      Leukemia Maternal Grandmother           Specific Family History:       FH of DM: Yes        FH of CAD: No  FH of HTN: Yes        FH of Cancer: Yes   FH of Kidney Cancer: No    Personal History:   Social History     Socioeconomic History     Marital status:      Spouse name: Not on file     Number of children: 2     Years of education: Not on file     Highest education level: Not on file   Occupational History     Not on file   Social Needs     Financial resource strain: Not on file  "    Food insecurity     Worry: Not on file     Inability: Not on file     Transportation needs     Medical: Not on file     Non-medical: Not on file   Tobacco Use     Smoking status: Never Smoker     Smokeless tobacco: Never Used   Substance and Sexual Activity     Alcohol use: Yes     Alcohol/week: 3.0 - 4.0 standard drinks     Types: 3 - 4 Standard drinks or equivalent per week     Frequency: 2-4 times a month     Drug use: Never     Sexual activity: Not on file   Lifestyle     Physical activity     Days per week: Not on file     Minutes per session: Not on file     Stress: Not on file   Relationships     Social connections     Talks on phone: Not on file     Gets together: Not on file     Attends [employer] service: Not on file     Active member of club or organization: Not on file     Attends meetings of clubs or organizations: Not on file     Relationship status: Not on file     Intimate partner violence     Fear of current or ex partner: Not on file     Emotionally abused: Not on file     Physically abused: Not on file     Forced sexual activity: Not on file   Other Topics Concern     Not on file   Social History Narrative     Not on file          Specific Social History:       Health Insurance Status: Yes       Employment Status: Full time  Occupation: Speech and Language Pathology for local [employer]                       Living Arrangements: lives with their spouse       Social Support: Yes       Presence of increased risk for disease transmission behaviors as defined by [facility] guidelines: No        Allergies:  No Known Allergies    Medications:  Prior to Admission medications    Not on File       Vitals:  Vital Signs 5/13/2021 5/13/2021 5/13/2021   Systolic 115 111 113   Diastolic 81 76 78   Pulse 86 - -   Weight (LB) 214 lb 14.4 oz - -   Height 5' 7.5\" - -   BMI (Calculated) 33.16 - -   O2 100 - -       Exam:   GENERAL APPEARANCE: alert and no distress  HENT: mouth without ulcers or lesions  LYMPHATICS: " no cervical or supraclavicular nodes  RESP: lungs clear to auscultation - no rales, rhonchi or wheezes  CV: regular rhythm, normal rate, no rub, no murmur  EDEMA: no LE edema bilaterally  ABDOMEN: soft, nondistended, nontender, bowel sounds normal  MS: extremities normal - no gross deformities noted, no evidence of inflammation in joints, no muscle tenderness  SKIN: no rash    Results:   Labs and imaging were ordered for this visit and reviewed by me.  Recent Results (from the past 336 hour(s))   Asymptomatic COVID-19 Virus (Coronavirus) by PCR    Collection Time: 05/11/21  7:30 AM    Specimen: Nasopharyngeal   Result Value Ref Range    COVID-19 Virus PCR to [facility] - Source Nasopharyngeal     COVID-19 Virus PCR to [facility] - Result       Test received-See reflex to IDDL test SARS CoV2 (COVID-19) Virus RT-PCR   SARS-CoV-2 COVID-19 Virus (Coronavirus) by PCR    Collection Time: 05/11/21  7:30 AM    Specimen: Nasopharyngeal   Result Value Ref Range    SARS-CoV-2 Virus Specimen Source Nasopharyngeal     SARS-CoV-2 PCR Result NEGATIVE     SARS-CoV-2 PCR Comment       Testing was performed using the Aptima SARS-CoV-2 Assay on the [employer] Instrument System.   Additional information about this Emergency Use Authorization (EUA) assay can be found via   the Lab Guide.     ABO/Rh type and screen    Collection Time: 05/13/21  6:36 AM   Result Value Ref Range    ABO O     RH(D) Pos     Antibody Screen Neg     Test Valid Only At          [facility]    Specimen Expires 05/16/2021    Lipid Profile    Collection Time: 05/13/21  6:36 AM   Result Value Ref Range    Cholesterol 193 <200 mg/dL    Triglycerides 77 <150 mg/dL    HDL Cholesterol 58 >49 mg/dL    LDL Cholesterol Calculated 120 (H) <100 mg/dL    Non HDL Cholesterol 135 (H) <130 mg/dL   Comprehensive metabolic panel    Collection Time: 05/13/21  6:36 AM   Result Value Ref Range    Sodium 140 133 - 144 mmol/L    Potassium 4.1 3.4 -  5.3 mmol/L    Chloride 107 94 - 109 mmol/L    Carbon Dioxide 30 20 - 32 mmol/L    Anion Gap 3 3 - 14 mmol/L    Glucose 96 70 - 99 mg/dL    Urea Nitrogen 12 7 - 30 mg/dL    Creatinine 0.88 0.52 - 1.04 mg/dL    GFR Estimate 79 >60 mL/min/[1.73_m2]    GFR Estimate If Black >90 >60 mL/min/[1.73_m2]    Calcium 8.6 8.5 - 10.1 mg/dL    Bilirubin Total 0.7 0.2 - 1.3 mg/dL    Albumin 3.8 3.4 - 5.0 g/dL    Protein Total 7.7 6.8 - 8.8 g/dL    Alkaline Phosphatase 81 40 - 150 U/L    ALT 18 0 - 50 U/L    AST 14 0 - 45 U/L   Hemoglobin A1c    Collection Time: 05/13/21  6:36 AM   Result Value Ref Range    Hemoglobin A1C 5.2 0 - 5.6 %   Phosphorus    Collection Time: 05/13/21  6:36 AM   Result Value Ref Range    Phosphorus 2.7 2.5 - 4.5 mg/dL   CMV Antibody IgG    Collection Time: 05/13/21  6:36 AM   Result Value Ref Range    CMV Antibody IgG 0.3 0.0 - 0.8 AI   EBV Capsid Antibody IgG    Collection Time: 05/13/21  6:36 AM   Result Value Ref Range    EBV Capsid Antibody IgG <0.2 0.0 - 0.8 AI   Hepatitis B core antibody    Collection Time: 05/13/21  6:36 AM   Result Value Ref Range    Hepatitis B Core Julia Nonreactive NR^Nonreactive   Hepatitis B Surface Antibody    Collection Time: 05/13/21  6:36 AM   Result Value Ref Range    Hepatitis B Surface Antibody 0.00 <8.00 m[IU]/mL   Hepatitis B surface antigen    Collection Time: 05/13/21  6:36 AM   Result Value Ref Range    Hep B Surface Agn Nonreactive NR^Nonreactive   Hepatitis C antibody    Collection Time: 05/13/21  6:36 AM   Result Value Ref Range    Hepatitis C Antibody Nonreactive NR^Nonreactive   HIV Antigen Antibody Combo Pretransplant    Collection Time: 05/13/21  6:36 AM   Result Value Ref Range    HIV Antigen Antibody Combo Pretransplant Nonreactive NR^Nonreactive   Treponema Abs w Reflex to RPR and Titer    Collection Time: 05/13/21  6:36 AM   Result Value Ref Range    Treponema Antibodies Nonreactive NR^Nonreactive   INR    Collection Time: 05/13/21  6:36 AM   Result Value  Ref Range    INR 1.00 0.86 - 1.14   Partial thromboplastin time    Collection Time: 05/13/21  6:36 AM   Result Value Ref Range    PTT 32 22 - 37 sec   CBC with platelets    Collection Time: 05/13/21  6:36 AM   Result Value Ref Range    WBC 8.7 4.0 - 11.0 10e9/L    RBC Count 4.61 3.8 - 5.2 10e12/L    Hemoglobin 14.1 11.7 - 15.7 g/dL    Hematocrit 42.9 35.0 - 47.0 %    MCV 93 78 - 100 fl    MCH 30.6 26.5 - 33.0 pg    MCHC 32.9 31.5 - 36.5 g/dL    RDW 11.9 10.0 - 15.0 %    Platelet Count 258 150 - 450 10e9/L   Bilirubin direct    Collection Time: 05/13/21  6:36 AM   Result Value Ref Range    Bilirubin Direct 0.2 0.0 - 0.2 mg/dL   EKG 12-lead complete w/read - Clinics    Collection Time: 05/13/21  1:18 PM   Result Value Ref Range    Interpretation ECG Click View Image link to view waveform and result

## 2021-05-17 ENCOUNTER — TRANSFERRED RECORDS (OUTPATIENT)
Dept: HEALTH INFORMATION MANAGEMENT | Facility: CLINIC | Age: 46
End: 2021-05-17

## 2021-05-17 LAB — URATE SERPL-MCNC: 3.5 MG/DL (ref 2.6–6)

## 2021-05-18 LAB
BSA: 2.19 M2
IOHEXOL CL UR+SERPL-VRATE: 119 ML/MIN
IOHEXOL CL UR+SERPL-VRATE: 3.12 MG/DL
IOHEXOL CL UR+SERPL-VRATE: 6.44 MG/DL
IOHEXOL CL UR+SERPL-VRATE: 94 /1.73 M2

## 2021-05-19 ENCOUNTER — COMMITTEE REVIEW (OUTPATIENT)
Dept: TRANSPLANT | Facility: CLINIC | Age: 46
End: 2021-05-19

## 2021-05-19 DIAGNOSIS — Z00.5 TRANSPLANT DONOR EVALUATION: Primary | ICD-10-CM

## 2021-05-19 LAB
A*: NORMAL
A*LOCUS SEROLOGIC EQUIVALENT: 1
A*LOCUS: NORMAL
A*SEROLOGIC EQUIVALENT: 24
AB TEST METHOD: NORMAL
B*: NORMAL
B*LOCUS SEROLOGIC EQUIVALENT: 65
B*LOCUS: NORMAL
B*SEROLOGIC EQUIVALENT: 35
BW-1: NORMAL
C*: NORMAL
C*LOCUS SEROLOGIC EQUIVALENT: 4
C*LOCUS: NORMAL
C*SEROLOGIC EQUIVALENT: 8
DPA1*: NORMAL
DPA1*LOCUS: NORMAL
DPB1*: NORMAL
DPB1*LOCUS NMDP: NORMAL
DPB1*LOCUS: NORMAL
DPB1*NMDP: NORMAL
DQA1*: NORMAL
DQA1*LOCUS: NORMAL
DQB1*: NORMAL
DQB1*LOCUS SEROLOGIC EQUIVALENT: 7
DQB1*LOCUS: NORMAL
DQB1*SEROLOGIC EQUIVALENT: 5
DRB1*: NORMAL
DRB1*LOCUS SEROLOGIC EQUIVALENT: 13
DRB1*LOCUS: NORMAL
DRB1*SEROLOGIC EQUIVALENT: 1
DRB3*LOCUS SEROLOGIC EQUIVALENT: 52
DRB3*LOCUS: NORMAL
DRSSO TEST METHOD: NORMAL

## 2021-05-19 NOTE — COMMITTEE REVIEW
Abdominal Committee Review Note     Evaluation Date:   Committee Review Date: 5/19/2021    Organ being evaluated for: Kidney    Transplant Phase:   Transplant Status:     Transplant Coordinator: No matching coordinators  Transplant Surgeon:       Referring Physician: Referred Self    Primary Diagnosis:   Secondary Diagnosis:     Committee Review Members:  Immunology Shon Tyler, PhD, Susana Flores   Nephrology Tejinder Wen MD, Grzegorz Sales MD, Alden Coronel MD   Nutrition Zeina Arora, RD   Pharmacy Luisa Boothe, [facility]    - Clinical Ana Quintero, [facility]   Transplant Eri Crump, RN, Mary Ann Mullins, RN, Danette Perez, RN, David Krishna MD, Cassandra Barney LPN, Maggie Montesinos, AGNES, Ruby Meléndez, AGNES, Nicolás Kohli MD, Jaspal Roque MD, Bee Smith MD, Beryl Cardona, APRN CNP       Transplant Eligibility: Acceptable Physical Health, Acceptable Mental Health    Committee Review Decision: Needs Re-presentation    Relative Contraindications: None    Absolute Contraindications: None    Committee Chair Nicolás Kohli MD verbally attested to the committee's decision.    Committee Discussion Details:     Reviewed Zora's abnormal evaluation findings:    Needs to loose 10lb prior to donation.     Also of note:     5-18-21.  CT review by Dr. Ricardo.  Left kidney ok for donation. Sub centimeter hypo densities on each kidney ok.  No need for follow up    Need to confirm she is up to date with mammogram.   Needs quant gold.     Next Steps:    Call patient to discuss results/reccomendations     If patient interested in continuing in donor eval, send appropriate orders/billing info    Above reviewed with Zora. She will have quant gold done at a her local [facility] and update me once she has lost required weight. Record request sent for mammogram.

## 2021-06-09 ENCOUNTER — DOCUMENTATION ONLY (OUTPATIENT)
Dept: TRANSPLANT | Facility: CLINIC | Age: 46
End: 2021-06-09

## 2021-06-09 ENCOUNTER — TELEPHONE (OUTPATIENT)
Dept: TRANSPLANT | Facility: CLINIC | Age: 46
End: 2021-06-09

## 2021-06-09 NOTE — PROGRESS NOTES
Internal tissue typing reviewed with interdisciplinary team at [facility] meeting.     Team advised NKR 3g typing for further review to determine recommendation for paired exchange vs direct donation.     Call made to Zora to review above, RACHELLE left. Will plan to send NKR MYRA and request for 3g once above reviewed.

## 2021-06-10 NOTE — TELEPHONE ENCOUNTER
Contact made with Zora and status of donor evaluation including remaining items reviewed. Also reviewed recommendations from [facility] meeting. Plan established for NKR tissue typing. Zora verbalized understanding and comfort with plan.

## 2021-06-14 ENCOUNTER — DOCUMENTATION ONLY (OUTPATIENT)
Dept: TRANSPLANT | Facility: CLINIC | Age: 46
End: 2021-06-14

## 2021-06-14 NOTE — LETTER
REIMBURSEMENT INFORMATION FOR LIVING ORGAN DONORS    LIVING ORGAN DONOR: This form MUST accompany & remain attached to Orders &  given to Provider and/or Healthcare Facility Business Office    PROVIDER/FACILITY INSTRUCTIONS: By accepting to perform these services for living organ  donation, the provider/facility agrees to exclusively bill the [facility] instead of billing  the patient or any insurance provider and agrees to accept the reimbursement, as described below, as  payment in full for services rendered.    PROVIDER BILLING INSTRUCTIONS:  1. [facility] agrees to pay for all authorized testing ordered by our transplant  program that is related to living organ donation. The attached orders/tests are part of the donor  Evaluation.    2. Do not bill the donor or donor's insurance. Send an itemized invoice, claim or statement to:    [facility]  Transplant Finance/Donor Billing  [address]  [place] [address]    3. Billing statements must include the patient first and last name, date of birth, the CPT procedure code  and date of service. Please bill service on the ORIGINAL UBO4 or 1500 with appropriate CPT/HCPCS  codes along with W-9 and send to the above address to insure timely reimbursement.    4. Claims should be submitted no later than six months from the date when services are rendered.  Claims denied for late submission should not be billed to the donor or their private insurance carrier.    5. [facility] will reimburse all charges at 100% of the Medicare Fee Schedule as  defined in the Code of Federal Regulations (CFR) 42, Chapter IV. This is to be considered payment  in full. [facility], the patient, and/or the patient's insurance are NOT to  be billed any balance, co-payment, or deductible, per Medicare regulations. **ATTN: Facility  providing services for attached/enclosed Living Donor Orders; If facility does  NOT AGREE to  the reimbursement rate stated above, PLEASE DENY SERVICES & refer Donor/patient back to  their [facility] Coordinator Transplant Center.    6. Patients are NOT to make any payments at the time of service.    Please forward this information to your billing department so that a donor account can be set up with  these instructions.    Should you have any questions, please contact the Donor Billing office at [phone],  Monday - Friday, 8:00 a.m. to 4:00 p.m.   Thank you for your assistance.

## 2021-06-14 NOTE — PROGRESS NOTES
JAKER 3g testing reviewed with Zora. She verbalized comfort with plan to move forward with tissue typing. NKR MYRA sent. Message sent to recipient team.

## 2021-06-16 DIAGNOSIS — Z00.5 TRANSPLANT DONOR EVALUATION: ICD-10-CM

## 2021-06-16 PROCEDURE — 86481 TB AG RESPONSE T-CELL SUSP: CPT | Performed by: INTERNAL MEDICINE

## 2021-06-16 PROCEDURE — 36415 COLL VENOUS BLD VENIPUNCTURE: CPT | Performed by: INTERNAL MEDICINE

## 2021-06-18 LAB
GAMMA INTERFERON BACKGROUND BLD IA-ACNC: 0 IU/ML
M TB IFN-G CD4+ BCKGRND COR BLD-ACNC: 10 IU/ML
M TB TUBERC IFN-G BLD QL: NEGATIVE
MITOGEN IGNF BCKGRD COR BLD-ACNC: 0 IU/ML
MITOGEN IGNF BCKGRD COR BLD-ACNC: 0 IU/ML

## 2021-07-28 ENCOUNTER — DOCUMENTATION ONLY (OUTPATIENT)
Dept: TRANSPLANT | Facility: CLINIC | Age: 46
End: 2021-07-28

## 2021-07-28 NOTE — PROGRESS NOTES
[facility] meeting review    Compatibility including NKR 3g and internal tissue typing reviewed. Team indicated pair could go direct however paired exchange recommended for better eplet match.     Plan to update Zora and check in regarding the status of her evaluation.

## 2021-07-29 ENCOUNTER — TELEPHONE (OUTPATIENT)
Dept: TRANSPLANT | Facility: CLINIC | Age: 46
End: 2021-07-29

## 2021-07-29 NOTE — TELEPHONE ENCOUNTER
Outreach call made to Zora to review recommendations from [facility] meeting and check in regarding evaluation.     Zora verbalized understanding of recommendations and will review with recipient.     Regarding weight loss (advised by committee prior to moving forward), Zora acknowledged minimal recent progress however a committment to ongoing efforts. Offered nutrition consult. Zora will let me know.     Plan established for Zora to reach out with any questions and call with updates.

## 2021-09-07 ENCOUNTER — TELEPHONE (OUTPATIENT)
Dept: TRANSPLANT | Facility: CLINIC | Age: 46
End: 2021-09-07

## 2021-09-07 NOTE — TELEPHONE ENCOUNTER
Call received from Zora to check in regarding the status of her donor evaluation. She confirmed she has spoken to her dad (her recipient) regarding paired exchange vs direct. Zora is open to paired exchange, but her dad hasn't confirmed yet if he is open to paired exchange. He is still in evaluation. Zora continues to work on weight loss and has made some progress, she has ~8lbs left. She inquired about Donor Shield benefits and plan established to connect her with SW.     Plan established for Zora to keep me posted on her weight loss progress, will ask SW to reach out to her regarding Donor Shield and update the recipient team. Zora verbalized understanding and comfort with this plan.

## 2021-09-19 ENCOUNTER — HEALTH MAINTENANCE LETTER (OUTPATIENT)
Age: 46
End: 2021-09-19

## 2021-09-27 ENCOUNTER — TELEPHONE (OUTPATIENT)
Dept: TRANSPLANT | Facility: CLINIC | Age: 46
End: 2021-09-27

## 2021-09-28 NOTE — TELEPHONE ENCOUNTER
Zora called to check in regarding the status of her evaluation. She is under the impression her recipient (her dad) is getting close to ready for transplant.   Zora's remaining item for evaluation is recommended 10lb weight loss. She reports that she has been making good progress and is down 6lbs (from 214lb at evaluation to 208lb now).   Plan to review above with committee to determine need for follow up surgery visit. Will update Zora with recommendations.

## 2021-11-01 ENCOUNTER — OFFICE VISIT (OUTPATIENT)
Dept: TRANSPLANT | Facility: CLINIC | Age: 46
End: 2021-11-01
Attending: SURGERY
Payer: COMMERCIAL

## 2021-11-01 VITALS
SYSTOLIC BLOOD PRESSURE: 139 MMHG | OXYGEN SATURATION: 99 % | WEIGHT: 203.3 LBS | BODY MASS INDEX: 32.67 KG/M2 | HEIGHT: 66 IN | HEART RATE: 87 BPM | DIASTOLIC BLOOD PRESSURE: 87 MMHG

## 2021-11-01 DIAGNOSIS — Z00.5 TRANSPLANT DONOR EVALUATION: Primary | ICD-10-CM

## 2021-11-01 PROCEDURE — 99214 OFFICE O/P EST MOD 30 MIN: CPT | Performed by: SURGERY

## 2021-11-01 ASSESSMENT — MIFFLIN-ST. JEOR: SCORE: 1581.16

## 2021-11-01 NOTE — PROGRESS NOTES
Transplant Surgery Consult Note    Medical record number: [id number]  YOB: 1975,   Consult requested by the patient for evaluation of kidney donation candidacy.    Assessment and Recommendations: Ms. López appears to be a good candidate for kidney donation at this point in the evaluation. The following issues will need to be addressed prior to formal review:  Her weight has been addressed and she meets criteria at this time for donation.  Abd obese but doable.   Wants to move forward ASAP since Dad is close to dialysis  Also wants to speak to social work to look at lost wage reimbursement options     Ms López was seen prior in May with the Committee recommendation to lose at least 10 pounds prior to donation. She has successfully accomplished that and wishes to donate asap as her father is approaching dialysis. Her work up is otherwise complete with up to date health screenings. She is an acceptable donor at this time. We will work to schedule donor and recipient surgery in early to mid December to accomodate this. She has undergone thorough eval and understands the risks and benefits of donation at this time. We have reached out to her coordinators to finalize scheduling process.    Tejinder Figueroa MD      I have reviewed history, examined patient and discussed plan with the fellow/resident/SCOTTIE.  I concur with the findings in this note.       Risks of the surgical procedure including but not limited to the rare risk of mortality discussed in detail. Patient verbalized good understanding and had several pertinent questions which were answered satisfactorily.           Total time: 30 minutes  Counselling time: 20 minutes    .  ---------------------------------------------------------------------------------------------------    HPI: Zora López is a 46 year old year old female who presents for a kidney donor evaluation.  Patient would like to donate to her father. She has previously been  evaluated and presents for weight eval prior to moving forward. She has had no major hospitalizations or health changes in the interim. She has had 4 prior pregnancies with 2 . Prior laparoscopic appendectomy 25 years ago.   Personal history of:   No    Yes  Cancer:    [x]      []             Comment:     Diabetes   [x]      []  Comment:    Thombosis   [x]      []  Comment:       Hepatitis   [x]      []  Comment:    Tuberculosis   [x]      []  Comment:   Back or neck pain:  [x]      []  Comment:      Kidney stones   [x]      []  Comment:                  Kidney infections  [x]      []  Comment:           Urinary retention  [x]      []            Comment:   Regular NSAID use:  [x]      []            Comment:      Constipation:   [x]      []            Comment:      Judaism  [x]      []            Comment:      Other:    []      []            Comment:         Past Medical History:   Diagnosis Date     Miscarriage     x2 in the first trimester     Plantar fasciitis      Past Surgical History:   Procedure Laterality Date     APPENDECTOMY       Family History   Problem Relation Age of Onset     Melanoma Mother      Diabetes Father      Hypertension Father      Kidney Disease Father      Diabetes Brother      Myocardial Infarction Paternal Grandfather      Leukemia Maternal Grandmother      Social History     Socioeconomic History     Marital status:      Spouse name: Not on file     Number of children: 2     Years of education: Not on file     Highest education level: Not on file   Occupational History     Not on file   Tobacco Use     Smoking status: Never Smoker     Smokeless tobacco: Never Used   Substance and Sexual Activity     Alcohol use: Yes     Alcohol/week: 3.0 - 4.0 standard drinks     Types: 3 - 4 Standard drinks or equivalent per week     Drug use: Never     Sexual activity: Not on file   Other Topics Concern     Not on file   Social History Narrative     Not on file     Social  Determinants of Health     Financial Resource Strain:      Difficulty of Paying Living Expenses:    Food Insecurity:      Worried About Running Out of Food in the Last Year:      Ran Out of Food in the Last Year:    Transportation Needs:      Lack of Transportation (Medical):      Lack of Transportation (Non-Medical):    Physical Activity:      Days of Exercise per Week:      Minutes of Exercise per Session:    Stress:      Feeling of Stress :    Social Connections:      Frequency of Communication with Friends and Family:      Frequency of Social Gatherings with Friends and Family:      Attends Scientology Services:      Active Member of Clubs or Organizations:      Attends Club or Organization Meetings:      Marital Status:    Intimate Partner Violence:      Fear of Current or Ex-Partner:      Emotionally Abused:      Physically Abused:      Sexually Abused:        ROS:   CONSTITUTIONAL:  No fevers or chills  EYES: negative for icterus  ENT:  negative for hearing loss, tinnitus and sore throat  RESPIRATORY:  negative for cough, sputum, dyspnea  CARDIOVASCULAR:  negative for chest pain  GASTROINTESTINAL:  negative for nausea, vomiting, diarrhea or constipation  GENITOURINARY:  negative for incontinence, dysuria, bladder emptying problems  HEME:  No easy bruising  INTEGUMENT:  negative for rash and pruritus  NEURO:  Negative for headache, seizure disorder    Allergies:   No Known Allergies    Medications:      Exam:   Pulse:  [87] 87  BP: (139)/(87) 139/87  SpO2:  [99 %] 99 %  Body mass index is 32.67 kg/m .  Pulse:  [87] 87  BP: (139)/(87) 139/87  SpO2:  [99 %] 99 %  Appearance: in no apparent distress.   Skin: normal  Head and Neck: Normal, no rashes or jaundice  Respiratory: normal respiratory excursions, no audible wheeze  Cardiovascular: RRR  Abdomen: rounded and symmetric, No distention   Extremeties: Edema, none  Neuro: without deficit       Diagnostics:   No results found for this or any previous visit (from  the past 336 hour(s)).

## 2021-11-01 NOTE — NURSING NOTE
"Chief Complaint   Patient presents with     Consult     Donor Eval     Blood pressure 139/87, pulse 87, height 1.68 m (5' 6.14\"), weight 92.2 kg (203 lb 4.8 oz), SpO2 99 %, not currently breastfeeding.    Beryl Meadows on 11/1/2021 at 1:55 PM    "

## 2021-11-01 NOTE — LETTER
11/1/2021     RE: Zora López  [address]  [facility] [address]    Dear Colleague,    Thank you for referring your patient, Zora López, to the [facility]. Please see a copy of my visit note below.    Transplant Surgery Consult Note    Medical record number: [id number]  YOB: 1975,   Consult requested by the patient for evaluation of kidney donation candidacy.    Assessment and Recommendations: Ms. López appears to be a good candidate for kidney donation at this point in the evaluation. The following issues will need to be addressed prior to formal review:  Her weight has been addressed and she meets criteria at this time for donation.  Abd obese but doable.   Wants to move forward ASAP since Dad is close to dialysis  Also wants to speak to social work to look at lost wage reimbursement options     Ms López was seen prior in May with the Committee recommendation to lose at least 10 pounds prior to donation. She has successfully accomplished that and wishes to donate asap as her father is approaching dialysis. Her work up is otherwise complete with up to date health screenings. She is an acceptable donor at this time. We will work to schedule donor and recipient surgery in early to mid December to accomodate this. She has undergone thorough eval and understands the risks and benefits of donation at this time. We have reached out to her coordinators to finalize scheduling process.    Tejinder Figueroa MD      I have reviewed history, examined patient and discussed plan with the fellow/resident/SCOTTIE.  I concur with the findings in this note.       Risks of the surgical procedure including but not limited to the rare risk of mortality discussed in detail. Patient verbalized good understanding and had several pertinent questions which were answered satisfactorily.           Total time: 30 minutes  Counselling time: 20  minutes    .  ---------------------------------------------------------------------------------------------------    HPI: Zora López is a 46 year old year old female who presents for a kidney donor evaluation.  Patient would like to donate to her father. She has previously been evaluated and presents for weight eval prior to moving forward. She has had no major hospitalizations or health changes in the interim. She has had 4 prior pregnancies with 2 . Prior laparoscopic appendectomy 25 years ago.   Personal history of:   No    Yes  Cancer:    [x]      []             Comment:     Diabetes   [x]      []  Comment:    Melanie   [x]      []  Comment:       Hepatitis   [x]      []  Comment:    Tuberculosis   [x]      []  Comment:   Back or neck pain:  [x]      []  Comment:      Kidney stones   [x]      []  Comment:                  Kidney infections  [x]      []  Comment:           Urinary retention  [x]      []            Comment:   Regular NSAID use:  [x]      []            Comment:      Constipation:   [x]      []            Comment:      Yarsanism  [x]      []            Comment:      Other:    []      []            Comment:         Past Medical History:   Diagnosis Date     Miscarriage     x2 in the first trimester     Plantar fasciitis      Past Surgical History:   Procedure Laterality Date     APPENDECTOMY       Family History   Problem Relation Age of Onset     Melanoma Mother      Diabetes Father      Hypertension Father      Kidney Disease Father      Diabetes Brother      Myocardial Infarction Paternal Grandfather      Leukemia Maternal Grandmother      Social History     Socioeconomic History     Marital status:      Spouse name: Not on file     Number of children: 2     Years of education: Not on file     Highest education level: Not on file   Occupational History     Not on file   Tobacco Use     Smoking status: Never Smoker     Smokeless tobacco: Never Used   Substance and  Sexual Activity     Alcohol use: Yes     Alcohol/week: 3.0 - 4.0 standard drinks     Types: 3 - 4 Standard drinks or equivalent per week     Drug use: Never     Sexual activity: Not on file   Other Topics Concern     Not on file   Social History Narrative     Not on file     Social Determinants of Health     Financial Resource Strain:      Difficulty of Paying Living Expenses:    Food Insecurity:      Worried About Running Out of Food in the Last Year:      Ran Out of Food in the Last Year:    Transportation Needs:      Lack of Transportation (Medical):      Lack of Transportation (Non-Medical):    Physical Activity:      Days of Exercise per Week:      Minutes of Exercise per Session:    Stress:      Feeling of Stress :    Social Connections:      Frequency of Communication with Friends and Family:      Frequency of Social Gatherings with Friends and Family:      Attends Orthodox Services:      Active Member of Clubs or Organizations:      Attends Club or Organization Meetings:      Marital Status:    Intimate Partner Violence:      Fear of Current or Ex-Partner:      Emotionally Abused:      Physically Abused:      Sexually Abused:        ROS:   CONSTITUTIONAL:  No fevers or chills  EYES: negative for icterus  ENT:  negative for hearing loss, tinnitus and sore throat  RESPIRATORY:  negative for cough, sputum, dyspnea  CARDIOVASCULAR:  negative for chest pain  GASTROINTESTINAL:  negative for nausea, vomiting, diarrhea or constipation  GENITOURINARY:  negative for incontinence, dysuria, bladder emptying problems  HEME:  No easy bruising  INTEGUMENT:  negative for rash and pruritus  NEURO:  Negative for headache, seizure disorder    Allergies:   No Known Allergies    Medications:      Exam:   Pulse:  [87] 87  BP: (139)/(87) 139/87  SpO2:  [99 %] 99 %  Body mass index is 32.67 kg/m .  Pulse:  [87] 87  BP: (139)/(87) 139/87  SpO2:  [99 %] 99 %  Appearance: in no apparent distress.   Skin: normal  Head and Neck:  Normal, no rashes or jaundice  Respiratory: normal respiratory excursions, no audible wheeze  Cardiovascular: RRR  Abdomen: rounded and symmetric, No distention   Extremeties: Edema, none  Neuro: without deficit     Diagnostics:   No results found for this or any previous visit (from the past 336 hour(s)).    Again, thank you for allowing me to participate in the care of your patient.      Sincerely,  Nicolás Kohli MD

## 2021-11-03 ENCOUNTER — COMMITTEE REVIEW (OUTPATIENT)
Dept: TRANSPLANT | Facility: CLINIC | Age: 46
End: 2021-11-03

## 2021-11-03 ENCOUNTER — DOCUMENTATION ONLY (OUTPATIENT)
Dept: TRANSPLANT | Facility: CLINIC | Age: 46
End: 2021-11-03

## 2021-11-03 NOTE — COMMITTEE REVIEW
Living Donor Committee Review Note Evaluation Date: 5/13/2021  Committee Review Date: 11/3/2021    Donor being evaluated for: Kidney    Transplant Phase: Evaluation  Transplant Status: Active    Transplant Coordinator: Caitlin Neely  Transplant Surgeon:       Committee Review Members:  Nephrology Tejinder Wen MD, Alden Coronel MD   Nutrition Zeina Arora, TRISH   Pharmacist Lester Mckenzie, [facility]    - Clinical Bri Noguera, [facility]   Transplant Amanda Ida Hopkins, AGNES, Maggie Bonilla, SUN, Ruby Meléndez, AGNES, Denise Arevalo LPN, Rachael Enriquez, AGNES, Caitlin Neely, RN, Haleigh Noguera, RN   Transplant Surgery Florence King MD, MD       Transplant Eligibility: Acceptable Mental Health, Acceptable Physical Health    Committee Review Decision: Approved    Relative Contraindications: None    Absolute Contraindications: None    Committee Chair Florence King MD verbally attested to the committee's decision.    Committee Discussion Details:     Recommended follow up with surgeon completed and reviewed.     Donor approved.     Message sent to recipient team.

## 2021-11-03 NOTE — PHARMACY-MEDICATION REGIMEN REVIEW
Pharmacy Living Kidney Donor Medication Evaluation     This patient is a 46 year old female being considered for living kidney donation. As part of the kidney pre-donation patient evaluation, pharmacy has screened this patient's electronic medical record for medication related concerns.    Assessment / Plan    No significant potential medication related issues are expected for this patient post surgery, based on the medical record medication list review.  Pharmacy will continue to participate in this patient's care throughout the surgery course.  Please contact pharmacy with any further medication related questions or concerns.       Lester Mckenzie, Pharm.D.  [facility]  [phone]

## 2021-11-03 NOTE — LETTER
Ms. Zora López   [address] [place] [id number]   November 3, 2021     Dear Zora   I am writing on behalf of the Transplant Program at the [facility]. I would like to take this opportunity to thank you for recently undergoing an evaluation as a possible kidney donor.   Your evaluation is complete and your test results have been reviewed and discussed by the donor team at our weekly meeting. I am pleased to tell you that the team has determined that you meet our criteria for living kidney donation and you have been approved to donate. I would be happy to review any specific details of your evaluation testing with you and to discuss the next steps in this process. Thank you.   Sincerely,   Caitlin Neely RN  Living Donor Coordinator       Living Donor Program  [facility], [facility]  Tele: [phone] or [phone], ext. [phone]

## 2021-11-09 ENCOUNTER — TELEPHONE (OUTPATIENT)
Dept: TRANSPLANT | Facility: CLINIC | Age: 46
End: 2021-11-09
Payer: COMMERCIAL

## 2021-11-09 NOTE — TELEPHONE ENCOUNTER
Outreach call made to Zora to confirm that she and her dad (recipient) have decided to move forward with direct donation for transplant. Date pending coordination of recipient testing. Plan to update Zora once timeframe confirmed, she knows how to reach me with questions.

## 2021-11-10 ENCOUNTER — TELEPHONE (OUTPATIENT)
Dept: TRANSPLANT | Facility: CLINIC | Age: 46
End: 2021-11-10
Payer: COMMERCIAL

## 2021-11-10 NOTE — TELEPHONE ENCOUNTER
I left a voice mail message for Zora.  She has questions about lost wage reimbursement.  I asked her to call me back to discuss this.    ARTEMIO Delgadillo, [facility]  Donor  and Independent Living Donor Advocate  [facility], [facility]  Pager:  [phone]  Direct:  [phone] Cell   E-Mail:  [email or web address]

## 2021-11-11 ENCOUNTER — TELEPHONE (OUTPATIENT)
Dept: TRANSPLANT | Facility: CLINIC | Age: 46
End: 2021-11-11
Payer: COMMERCIAL

## 2021-11-11 NOTE — TELEPHONE ENCOUNTER
Per recipient team, it is anticipated that recipient will be ready for transplant end of January. Dates discussed with Zora and plan established for surgery 2/3/21. Recipient team updated. Plan to review pre op plan once surgeon identified.

## 2021-11-19 ENCOUNTER — TELEPHONE (OUTPATIENT)
Dept: TRANSPLANT | Facility: CLINIC | Age: 46
End: 2021-11-19
Payer: COMMERCIAL

## 2021-11-19 NOTE — TELEPHONE ENCOUNTER
I spoke with Zora today and answered numerous questions about the pros and cons of utilizing the donor shield benefits versus her employer's short term disability benefits.    ARTEMIO Delgadillo, [facility]  Donor  and Independent Living Donor Advocate  [facility], [facility]  Pager:  [phone]  Direct:  [phone] Cell   E-Mail:  [email or web address]

## 2021-11-30 ENCOUNTER — TELEPHONE (OUTPATIENT)
Dept: TRANSPLANT | Facility: CLINIC | Age: 46
End: 2021-11-30
Payer: COMMERCIAL

## 2021-12-13 NOTE — TELEPHONE ENCOUNTER
Coordinator called pt to go over instructions for upcoming kidney donation surgery on 2/3/22. She confirmed her desire to proceed as a donor. She is aware that pending recipient evaluation could cause a need for cancellation or rescheduling of surgery.     Pt will have pre-op appointments 1/25/22 and 2/1/22. Reviewed anticipated OR check in time at 5am and OR start time of 7:30am . Explained that times are subject to change and will keep them updated with changes.    Recommended 14 day pre op self isolation and strict COVID precautions reviewed.     Reviewed general components of inpatient stay and post op routines.    Medication list reviewed: no medications    FMLA/work paper work reviewed: She will send me appropriate paperwork, Donor Shield enrollment reviewed.    Cari verbalized understanding and comfort with plan, denied questions.

## 2021-12-28 DIAGNOSIS — Z52.4 ENCOUNTER FOR DONATION OF KIDNEY: Primary | ICD-10-CM

## 2021-12-28 NOTE — LETTER
Zora López  [address] [facility] [address]      Dear Zora López:    You have been scheduled for kidney donation surgery on 2/3/22.    Please note: COVID 19 testing is required prior to your surgery.     You will have a COVID test at BOTH of your pre-op visits listed below. Please strictly follow COVID 19 safety guidelines and self-quarantine for 14 days prior to your surgery to minimize your potential for exposure. A positive COVID 19 test will result in postponement of surgery.      Please stay up to date on [facility] Guidelines and visitor restrictions for COVID 19 at [email or web address].[email or web address].       You have two pre-op visits:    1.)  1/25/22, see [facility] for specific times     Lab work, COVID test #1, Coordinator visit, Nurse Practitioner     Please report to the Lab area on the 1st Floor of the [facility].  You do not need to fast for these labs.      After getting your labs drawn, please report to [facility] on the 3rd floor, 3A.    2.)  2/1/22, see [email or web address] for specific times     COVID test #2, Chest X-ray, Dr. King     Please report to the Lab/Imaging area on the 1st Floor of the [facility].  You do not need to fast.  After getting your COVID test and Chest X-ray, please report to [facility] on the 3rd floor, 3A.    If you have a Living Will or Power of  appointed, please bring a copy of this paperwork with you. If you do not, please contact your donor  by calling [phone].    On the day of surgery, please report to Unit 3[address], at [facility], [address] [place]  [address], by 5:00 a.m.      Some additional restrictions before surgery:    Do not take any aspirin, ibuprofen, or similar medications one week prior to surgery.    Women who are on hormone replacement therapy or birth control pills should stop these medications one month prior to  surgery.  You may resume them one month after surgery. Please use an alternative form of birth control.    Please contact us if you develop symptoms of a cold or any infection in the two weeks prior to surgery.    Do not obtain the flu shot or any other immunizations 72 hours prior to your surgery date.    If you have any questions or concerns, please contact [facility] at [phone].  We are available Monday through Friday, from 6:00 a.m. to 4:30 p.m.    Thank you for choosing the [facility] to help you donate the Gift of Life.    Sincerely,    Caitlin Neely RN  Transplant Coordinator                                                                         [facility]     Address:      [address] [place] [id number]        Phone Numbers:  Recipient line:  [phone]  Donor line:  [phone]      Directions:  1. [address] to [address], [place], and merge into the left turn leander.  2. Turn left on [facility] [address]. The [facility] will be on your right. Turn into the driveway for  parking service.    Parking:     parking: We encourage patients and visitors to use convenient  parking service at the [facility]. Enter the main arrival plaza from [facility].  cost is a $6 flat rate fee, regardless of your length of visit. No tips are accepted.     Self-parking: Enter the main arrival plaza at the [facility] from [facility]. From there, parking attendants will direct you to the best self-parking option. Bring your parking tickets inside with you and pay for parking before leaving the Center to get the best parking rate.                  Enhanced Recovery after Surgery   For Living Kidney Donors      Drink Ensure evening of 2/2/22  before bedtime           NO SOLID FOODS after bedtime    Drink Ensure when wake up 2/3/22 prior to 5:00am          CHECK IN TO 3C at 5:00am    NOTHING to EAT or  DRINK after 5:00am           START TIME:  7:30am for surgery    This is a team effort, including you, to get you back on your feet, eating and drinking normally and out of the hospital as quickly as possible. The goals are:  1. NO INFECTIONS  2. RETURN TO A NORMAL DIET    How can we achieve these goals?  1. STAY ACTIVE: Walk every day before your surgery; try to increase the amount every day. Walk after surgery as much as you can-the nurses will help you. Walking speeds healing and gets you home quicker; you heal better at home and have less risk of infection.  2. INCENTIVE SPIROMETER: Practice your incentive spirometer 4 times per day with 5 repetitions each time. Using the incentive spirometer can strengthen your muscles between your ribs and help you have a strong cough after surgery. A more effective cough can help prevent problems with your lungs.  3. STAY HYDRATED: Drink clear liquids up until 2 hours before your surgery. We will give you a special drink that will keep you hydrated.  Drink this before bedtime and on the way into the hospital. Keeping well hydrated leads to your veins being plump, you wake up faster, and you are less likely to be nauseated. Start drinking water as soon as you can after surgery and advance to clear liquids and food as tolerated. IV fluids contain salt, drinking fluids will minimize the amount of IV fluids you need and decrease the amount of salt you get.   Staying hydrated after you go home from the hospital is very important. Ensure Clear or Gatorade are good options to keep you hydrated.  4. PAIN MANAGEMENT: If we minimize the amount of opioids and narcotics, and use regional blocks (which numb the area where your surgery is) along with oral pain medications; you will have less side effects of nausea and constipation. Narcotics can slow down your bowels and cause you to stay in the hospital longer.    Our goal is to keep you comfortable; eating and drinking normally and back  home safely.    Showering before Surgery    Your surgeon has asked you to take 2 showers before surgery.  Why is this important?    It is normal for bacteria (germs) to be on your skin. The skin protects us from these germs. When you have surgery, we cut the skin. Sometimes germs get into the cuts and cause infection (illness caused by germs). By following the instructions below and using special soap, you will lower the number of germs on your skin. This decreases your chance of infection.    Special Soap    You will be provided with 8 ounces of antiseptic surgical soap called 4% CHG. Common name brands of this soap are Hibiclens and Exidine    A note about shaving:  Do not shave within 12 inches of your incision (surgical Cut) area for at least 3 days before surgery. Shaving can make small cuts in the skin. This puts you at a higher risk of infection    Items you will need for each shower:    1 newly washed towel    4 ounces of one of the recommended soaps    Clean pajamas or clothing to change into    Follow these instructions:    Follow these steps the evening before surgery and the morning of surgery.  1. Wash your hair and body with your regular shampoo and soap. Make sure you rinse the shampoo and soap from your hair and body.  2. Using clean hands, apply about 2 ounces of surgery soap on your skin from the neck to your toes. Use on your groin area last. DO NOT use this soap on your face or head. If you get any soap in your eyes, ears or mouth, rinse right away.  3. Repeat step 2. It is very important to let the soap stay on your skin for at least 1 minute.  4. Rinse well and dry off using a clean towel. If you feel any tingling, itching or other irritation, rinse right away. It is normal to feel some coolness on the skin after using the antiseptic soap. Your skin may feel a bit dry after the shower, but DO NOT use any lotions, creams or moisturizers. Do not use hair spray or other products in your  hair.  5. Dress in freshly washed clothes or pajamas.       --Repeat these steps the morning of surgery--

## 2021-12-28 NOTE — PROGRESS NOTES
Coordinator called pt to go over instructions for upcoming kidney donation surgery on 2/3/22. Zora confirmed her desire to proceed as a donor.     Pt will have pre-op appointments 1/25/22 and 2/1/22. Reviewed OR check in time at 5am. Explained that times are subject to change and will keep them updated with changes, should check MyChart for specific appointment times.     Recommended 14 day pre op self isolation and strict COVID precautions reviewed.     Reviewed general components of inpatient stay and post op routines.    Medication list reviewed: no medications    FMLA/work paper work reviewed: completed, Donor Shield enrollment reviewed.    Zora verbalized understanding and comfort with plan, denied questions.

## 2022-01-03 ENCOUNTER — DOCUMENTATION ONLY (OUTPATIENT)
Dept: TRANSPLANT | Facility: CLINIC | Age: 47
End: 2022-01-03
Payer: COMMERCIAL

## 2022-01-03 NOTE — PROGRESS NOTES
DonorShield application completed. Zora and RODRIGUEZ updated.     Additionally, message received from Zora reporting concerns with lost wages during donation. Message routed to RODRIGUEZ requesting follow up with donor regarding resources.

## 2022-01-11 ENCOUNTER — TELEPHONE (OUTPATIENT)
Dept: TRANSPLANT | Facility: CLINIC | Age: 47
End: 2022-01-11
Payer: COMMERCIAL

## 2022-01-11 NOTE — TELEPHONE ENCOUNTER
I spoke to Zora this morning about her pay stub submission to [employer].  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  Her surgery is scheduled on 2/3/22.  I am going to assist her by contacting R to see what she needs to do to ensure that she is reimbursed for the appropriate amount.    ARTEMIO Delgadillo, [facility]  Living Donor   [facility], [facility], [facility]  Pager:  [phone]  Direct:  [phone] Cell Phone  E-Mail:  [email or web address]

## 2022-01-18 ENCOUNTER — TELEPHONE (OUTPATIENT)
Dept: TRANSPLANT | Facility: CLINIC | Age: 47
End: 2022-01-18
Payer: COMMERCIAL

## 2022-01-18 NOTE — TELEPHONE ENCOUNTER
I spoke to Zora after obtaining a response from [employer] regarding her paystubs.  They said she should wait to submit her 1/31/22 paystub which will reflect her new pay rate.  There will be time to do this, since they will not reimburse her until after our center verifies that she did donate.  Zora was fine with this response.    ARTEMIO Delgadillo, [facility]  Living Donor   [facility], [facility], [facility]  Pager:  [phone]  Direct:  [phone] Cell Phone  E-Mail:  [email or web address]

## 2022-01-25 ENCOUNTER — LAB (OUTPATIENT)
Dept: LAB | Facility: CLINIC | Age: 47
End: 2022-01-25
Attending: SURGERY
Payer: COMMERCIAL

## 2022-01-25 ENCOUNTER — ALLIED HEALTH/NURSE VISIT (OUTPATIENT)
Dept: TRANSPLANT | Facility: CLINIC | Age: 47
End: 2022-01-25
Attending: TRANSPLANT SURGERY
Payer: COMMERCIAL

## 2022-01-25 ENCOUNTER — OFFICE VISIT (OUTPATIENT)
Dept: TRANSPLANT | Facility: CLINIC | Age: 47
End: 2022-01-25
Attending: SURGERY
Payer: COMMERCIAL

## 2022-01-25 VITALS
DIASTOLIC BLOOD PRESSURE: 84 MMHG | BODY MASS INDEX: 33.2 KG/M2 | HEART RATE: 83 BPM | OXYGEN SATURATION: 97 % | WEIGHT: 206.6 LBS | SYSTOLIC BLOOD PRESSURE: 126 MMHG

## 2022-01-25 DIAGNOSIS — Z52.4 ENCOUNTER FOR DONATION OF KIDNEY: ICD-10-CM

## 2022-01-25 DIAGNOSIS — Z00.5 TRANSPLANT DONOR EVALUATION: Primary | ICD-10-CM

## 2022-01-25 LAB
ABO/RH(D): NORMAL
ALBUMIN UR-MCNC: NEGATIVE MG/DL
ANTIBODY SCREEN: NEGATIVE
APPEARANCE UR: CLEAR
B-HCG SERPL-ACNC: <1 IU/L (ref 0–5)
BACTERIA #/AREA URNS HPF: ABNORMAL /HPF
BILIRUB UR QL STRIP: NEGATIVE
CMV IGG SERPL IA-ACNC: <0.2 U/ML
CMV IGG SERPL IA-ACNC: NORMAL
COLOR UR AUTO: COLORLESS
CREAT SERPL-MCNC: 0.82 MG/DL (ref 0.52–1.04)
EBV VCA IGG SER IA-ACNC: <10 U/ML
EBV VCA IGG SER IA-ACNC: NORMAL
EBV VCA IGM SER IA-ACNC: <10 U/ML
EBV VCA IGM SER IA-ACNC: NORMAL
GFR SERPL CREATININE-BSD FRML MDRD: 89 ML/MIN/1.73M2
GLUCOSE UR STRIP-MCNC: NEGATIVE MG/DL
HBV CORE AB SERPL QL IA: NONREACTIVE
HBV SURFACE AG SERPL QL IA: NONREACTIVE
HCV AB SERPL QL IA: NONREACTIVE
HGB BLD-MCNC: 14.8 G/DL (ref 11.7–15.7)
HGB UR QL STRIP: NEGATIVE
HIV 1+2 AB+HIV1 P24 AG SERPL QL IA: NONREACTIVE
KETONES UR STRIP-MCNC: NEGATIVE MG/DL
LEUKOCYTE ESTERASE UR QL STRIP: NEGATIVE
NITRATE UR QL: NEGATIVE
PH UR STRIP: 6 [PH] (ref 5–7)
RBC URINE: <1 /HPF
SP GR UR STRIP: 1 (ref 1–1.03)
SPECIMEN EXPIRATION DATE: NORMAL
SQUAMOUS EPITHELIAL: <1 /HPF
UROBILINOGEN UR STRIP-MCNC: NORMAL MG/DL
WBC URINE: 1 /HPF

## 2022-01-25 PROCEDURE — 87521 HEPATITIS C PROBE&RVRS TRNSC: CPT | Mod: 90 | Performed by: PATHOLOGY

## 2022-01-25 PROCEDURE — 81001 URINALYSIS AUTO W/SCOPE: CPT | Performed by: PATHOLOGY

## 2022-01-25 PROCEDURE — 99214 OFFICE O/P EST MOD 30 MIN: CPT | Performed by: SURGERY

## 2022-01-25 PROCEDURE — 86901 BLOOD TYPING SEROLOGIC RH(D): CPT | Mod: 90 | Performed by: PATHOLOGY

## 2022-01-25 PROCEDURE — 84702 CHORIONIC GONADOTROPIN TEST: CPT | Performed by: PATHOLOGY

## 2022-01-25 PROCEDURE — U0003 INFECTIOUS AGENT DETECTION BY NUCLEIC ACID (DNA OR RNA); SEVERE ACUTE RESPIRATORY SYNDROME CORONAVIRUS 2 (SARS-COV-2) (CORONAVIRUS DISEASE [COVID-19]), AMPLIFIED PROBE TECHNIQUE, MAKING USE OF HIGH THROUGHPUT TECHNOLOGIES AS DESCRIBED BY CMS-2020-01-R: HCPCS | Mod: 90 | Performed by: PATHOLOGY

## 2022-01-25 PROCEDURE — 85018 HEMOGLOBIN: CPT | Performed by: PATHOLOGY

## 2022-01-25 PROCEDURE — 99000 SPECIMEN HANDLING OFFICE-LAB: CPT | Performed by: PATHOLOGY

## 2022-01-25 PROCEDURE — 86900 BLOOD TYPING SEROLOGIC ABO: CPT | Mod: 90 | Performed by: PATHOLOGY

## 2022-01-25 PROCEDURE — 86665 EPSTEIN-BARR CAPSID VCA: CPT | Mod: 90 | Performed by: PATHOLOGY

## 2022-01-25 PROCEDURE — U0005 INFEC AGEN DETEC AMPLI PROBE: HCPCS | Mod: 90 | Performed by: PATHOLOGY

## 2022-01-25 PROCEDURE — 87798 DETECT AGENT NOS DNA AMP: CPT | Mod: 90 | Performed by: PATHOLOGY

## 2022-01-25 PROCEDURE — 87535 HIV-1 PROBE&REVERSE TRNSCRPJ: CPT | Mod: 90 | Performed by: PATHOLOGY

## 2022-01-25 PROCEDURE — 87340 HEPATITIS B SURFACE AG IA: CPT | Mod: 90 | Performed by: PATHOLOGY

## 2022-01-25 PROCEDURE — 36415 COLL VENOUS BLD VENIPUNCTURE: CPT | Performed by: PATHOLOGY

## 2022-01-25 PROCEDURE — 86644 CMV ANTIBODY: CPT | Mod: 90 | Performed by: PATHOLOGY

## 2022-01-25 PROCEDURE — 82565 ASSAY OF CREATININE: CPT | Performed by: PATHOLOGY

## 2022-01-25 PROCEDURE — 86803 HEPATITIS C AB TEST: CPT | Mod: 90 | Performed by: PATHOLOGY

## 2022-01-25 PROCEDURE — 86850 RBC ANTIBODY SCREEN: CPT | Mod: 90 | Performed by: PATHOLOGY

## 2022-01-25 PROCEDURE — 87516 HEPATITIS B DNA AMP PROBE: CPT | Mod: 90 | Performed by: PATHOLOGY

## 2022-01-25 PROCEDURE — 86704 HEP B CORE ANTIBODY TOTAL: CPT | Mod: 90 | Performed by: PATHOLOGY

## 2022-01-25 PROCEDURE — 99214 OFFICE O/P EST MOD 30 MIN: CPT | Performed by: NURSE PRACTITIONER

## 2022-01-25 NOTE — NURSING NOTE
Chief Complaint   Patient presents with     Consult     day minus donor     Blood pressure 126/84, pulse 83, weight 93.7 kg (206 lb 9.6 oz), SpO2 97 %, not currently breastfeeding.    Beryl Meadows on 1/25/2022 at 10:09 AM

## 2022-01-25 NOTE — LETTER
Date:February 8, 2022      Patient was self referred, no letter generated. Do not send.        [facility] Health Information

## 2022-01-25 NOTE — LETTER
1/25/2022     RE: Zora López  [address] [facility] [address]    Dear Colleague,    Thank you for referring your patient, Zora López, to the [facility]. Please see a copy of my visit note below.    Transplant Surgery H&P                                                        HPI:                                                      Ms. López is a 46 year old female who comes to clinic today for preop prior to planned laparoscopic kidney donation surgery. The patient was previously reviewed by the living donor multidisciplinary selection committee and found to be medically and psychosocially appropriate for voluntary kidney donation. The patient denies any feelings of being pressured to proceed with kidney donation.  Health events since donor evaluation: None    Special considerations:   Constipation: yes  PONV: no  History of Urinary retention? no  Significant neck/back/joint concerns for lateral decubitus positioning?: No  Jew: No    MEDICAL HISTORY:                                                      Patient Active Problem List    Diagnosis Date Noted     Donor of kidney for transplant 05/13/2021     Priority: Medium      Past Medical History:   Diagnosis Date     Miscarriage     x2 in the first trimester     Plantar fasciitis      Past Surgical History:   Procedure Laterality Date     APPENDECTOMY       No current outpatient medications on file.     OTC products: None, except as noted above  No Known Allergies   Social History     Tobacco Use     Smoking status: Never Smoker     Smokeless tobacco: Never Used   Substance Use Topics     Alcohol use: Yes     Alcohol/week: 3.0 - 4.0 standard drinks     Types: 3 - 4 Standard drinks or equivalent per week     History   Drug Use Unknown       REVIEW OF SYSTEMS:                                                    CONSTITUTIONAL: NEGATIVE for fever, chills, change in weight  INTEGUMENTARY/SKIN:  NEGATIVE for worrisome rashes, moles or lesions  EYES: NEGATIVE for vision changes or irritation  ENT/MOUTH: NEGATIVE for ear, mouth and throat problems  RESP: NEGATIVE for significant cough or SOB  BREAST: NEGATIVE for masses, tenderness or discharge  CV: NEGATIVE for chest pain, palpitations or peripheral edema  GI: NEGATIVE for nausea, abdominal pain, heartburn, or change in bowel habits  : NEGATIVE for frequency, dysuria, or hematuria  MUSCULOSKELETAL: NEGATIVE for significant arthralgias or myalgia  NEURO: NEGATIVE for weakness, dizziness or paresthesias  ENDOCRINE: NEGATIVE for temperature intolerance, skin/hair changes  HEME: NEGATIVE for bleeding problems  PSYCHIATRIC: NEGATIVE for changes in mood or affect    EXAM:                                                    There were no vitals taken for this visit.    GENERAL APPEARANCE: healthy, alert and no distress     EYES: EOMI, PERRL     HENT: ear canals and TM's normal and nose and mouth without ulcers or lesions     NECK: no adenopathy, no asymmetry, masses, or scars and thyroid normal to palpation     RESP: lungs clear to auscultation - no rales, rhonchi or wheezes     CV: regular rates and rhythm, normal S1 S2, no S3 or S4 and no murmur, click or rub     ABDOMEN:  soft, nontender, no HSM or masses and bowel sounds normal     MS: extremities normal- no gross deformities noted, no evidence of inflammation in joints, FROM in all extremities.     SKIN: no suspicious lesions or rashes     NEURO: Normal strength and tone, sensory exam grossly normal, mentation intact and speech normal     PSYCH: mentation appears normal. and affect normal/bright     LYMPHATICS: No cervical adenopathy    DIAGNOSTICS:                                                      EKG: appears normal, NSR, normal axis, normal intervals, no acute ST/T changes c/w ischemia, no LVH by voltage criteria, unchanged from previous tracings  Chest XRay  Labs Resulted Today:   Results for orders  placed or performed in visit on 01/25/22   UA with Microscopic reflex to Culture     Status: Abnormal    Specimen: Urine, Midstream   Result Value Ref Range    Color Urine Colorless Colorless, Straw, Light Yellow, Yellow    Appearance Urine Clear Clear    Glucose Urine Negative Negative mg/dL    Bilirubin Urine Negative Negative    Ketones Urine Negative Negative mg/dL    Specific Gravity Urine 1.002 (L) 1.003 - 1.035    Blood Urine Negative Negative    pH Urine 6.0 5.0 - 7.0    Protein Albumin Urine Negative Negative mg/dL    Urobilinogen Urine Normal Normal, 2.0 mg/dL    Nitrite Urine Negative Negative    Leukocyte Esterase Urine Negative Negative    Bacteria Urine Few (A) None Seen /HPF    RBC Urine <1 <=2 /HPF    WBC Urine 1 <=5 /HPF    Squamous Epithelials Urine <1 <=1 /HPF    Narrative    Urine Culture not indicated   Hemoglobin     Status: Normal   Result Value Ref Range    Hemoglobin 14.8 11.7 - 15.7 g/dL   ABO/Rh type and screen     Status: None (In process)    Narrative    The following orders were created for panel order ABO/Rh type and screen.  Procedure                               Abnormality         Status                     ---------                               -----------         ------                     Adult Type and Screen[[id number]]                            In process                   Please view results for these tests on the individual orders.     Labs Drawn and in Process:   Unresulted Labs Ordered in the Past 30 Days of this Admission     Date and Time Order Name Status Description    1/25/2022  9:36 AM TYPE AND SCREEN, ADULT In process     1/25/2022  9:36 AM COVID-19 VIRUS (CORONAVIRUS) BY PCR In process     1/25/2022  9:36 AM CREATININE In process     1/25/2022  9:36 AM HBV HCV HIV WNV BY RAKEL In process     1/25/2022  9:36 AM HIV ANTIGEN ANTIBODY COMBO PRETRANSPLANT In process     1/25/2022  9:36 AM HEPATITIS C ANTIBODY In process     1/25/2022  9:36 AM HEPATITIS B SURFACE ANTIGEN  In process     1/25/2022  9:36 AM HEPATITIS B CORE ANTIBODY In process     1/25/2022  9:36 AM HCG QUANTITATIVE PREGNANCY In process     1/25/2022  9:36 AM HLA FINAL CROSSMATCH DONOR In process     1/25/2022  9:36 AM EBV CAPSID ANTIBODY IGM In process     1/25/2022  9:36 AM EBV CAPSID ANTIBODY IGG In process     1/25/2022  9:36 AM CMV ANTIBODY IGG In process         Recent Labs   Lab Test 05/13/21  0636 04/09/21  0849   HGB 14.1 13.8     --    INR 1.00  --      --    POTASSIUM 4.1  --    CR 0.88 0.93   A1C 5.2  --       Assessment:                                                    Healthy voluntary kidney donor    There have been no significant intercurrent medical problems or change of intent in proceeding with kidney donation as scheduled on 2/3/2022.    1. Labs reviewed and within normal limits: Yes.  Labs reviewed and discussed with patient  2. EKG (5/13/2021): appears normal, NSR  3. Paired Exchange case: No  4. ABO= O  5. Laterality: left  6. Outstanding issues: Final ABO, cross match, and covid-19 test    Plan:                                                      1. Consent: Not Done  2. Outstanding issues:  Final ABO, cross match, and covid-19 test    Signed Electronically by: Maggie Bonilla NP

## 2022-01-25 NOTE — LETTER
1/25/2022     RE: Zora López  [address]  [facility] [address]    Transplant Surgery H&P                                                        HPI:                                                      Ms. López is a 46 year old female who comes to clinic today for preop prior to planned laparoscopic kidney donation surgery. The patient was previously reviewed by the living donor multidisciplinary selection committee and found to be medically and psychosocially appropriate for voluntary kidney donation. The patient denies any feelings of being pressured to proceed with kidney donation.  Health events since donor evaluation: None    Special considerations:   Constipation: yes  PONV: no  History of Urinary retention? no  Significant neck/back/joint concerns for lateral decubitus positioning?: No  Scientologist: No    MEDICAL HISTORY:                                                      Patient Active Problem List    Diagnosis Date Noted     Donor of kidney for transplant 05/13/2021     Priority: Medium      Past Medical History:   Diagnosis Date     Miscarriage     x2 in the first trimester     Plantar fasciitis      Past Surgical History:   Procedure Laterality Date     APPENDECTOMY       No current outpatient medications on file.     OTC products: None, except as noted above  No Known Allergies   Social History     Tobacco Use     Smoking status: Never Smoker     Smokeless tobacco: Never Used   Substance Use Topics     Alcohol use: Yes     Alcohol/week: 3.0 - 4.0 standard drinks     Types: 3 - 4 Standard drinks or equivalent per week     History   Drug Use Unknown       REVIEW OF SYSTEMS:                                                    CONSTITUTIONAL: NEGATIVE for fever, chills, change in weight  INTEGUMENTARY/SKIN: NEGATIVE for worrisome rashes, moles or lesions  EYES: NEGATIVE for vision changes or irritation  ENT/MOUTH: NEGATIVE for ear, mouth and throat problems  RESP: NEGATIVE  for significant cough or SOB  BREAST: NEGATIVE for masses, tenderness or discharge  CV: NEGATIVE for chest pain, palpitations or peripheral edema  GI: NEGATIVE for nausea, abdominal pain, heartburn, or change in bowel habits  : NEGATIVE for frequency, dysuria, or hematuria  MUSCULOSKELETAL: NEGATIVE for significant arthralgias or myalgia  NEURO: NEGATIVE for weakness, dizziness or paresthesias  ENDOCRINE: NEGATIVE for temperature intolerance, skin/hair changes  HEME: NEGATIVE for bleeding problems  PSYCHIATRIC: NEGATIVE for changes in mood or affect    EXAM:                                                    There were no vitals taken for this visit.    GENERAL APPEARANCE: healthy, alert and no distress     EYES: EOMI, PERRL     HENT: ear canals and TM's normal and nose and mouth without ulcers or lesions     NECK: no adenopathy, no asymmetry, masses, or scars and thyroid normal to palpation     RESP: lungs clear to auscultation - no rales, rhonchi or wheezes     CV: regular rates and rhythm, normal S1 S2, no S3 or S4 and no murmur, click or rub     ABDOMEN:  soft, nontender, no HSM or masses and bowel sounds normal     MS: extremities normal- no gross deformities noted, no evidence of inflammation in joints, FROM in all extremities.     SKIN: no suspicious lesions or rashes     NEURO: Normal strength and tone, sensory exam grossly normal, mentation intact and speech normal     PSYCH: mentation appears normal. and affect normal/bright     LYMPHATICS: No cervical adenopathy    DIAGNOSTICS:                                                      EKG: appears normal, NSR, normal axis, normal intervals, no acute ST/T changes c/w ischemia, no LVH by voltage criteria, unchanged from previous tracings  Chest XRay  Labs Resulted Today:   Results for orders placed or performed in visit on 01/25/22   UA with Microscopic reflex to Culture     Status: Abnormal    Specimen: Urine, Midstream   Result Value Ref Range    Color Urine  Colorless Colorless, Straw, Light Yellow, Yellow    Appearance Urine Clear Clear    Glucose Urine Negative Negative mg/dL    Bilirubin Urine Negative Negative    Ketones Urine Negative Negative mg/dL    Specific Gravity Urine 1.002 (L) 1.003 - 1.035    Blood Urine Negative Negative    pH Urine 6.0 5.0 - 7.0    Protein Albumin Urine Negative Negative mg/dL    Urobilinogen Urine Normal Normal, 2.0 mg/dL    Nitrite Urine Negative Negative    Leukocyte Esterase Urine Negative Negative    Bacteria Urine Few (A) None Seen /HPF    RBC Urine <1 <=2 /HPF    WBC Urine 1 <=5 /HPF    Squamous Epithelials Urine <1 <=1 /HPF    Narrative    Urine Culture not indicated   Hemoglobin     Status: Normal   Result Value Ref Range    Hemoglobin 14.8 11.7 - 15.7 g/dL   ABO/Rh type and screen     Status: None (In process)    Narrative    The following orders were created for panel order ABO/Rh type and screen.  Procedure                               Abnormality         Status                     ---------                               -----------         ------                     Adult Type and Screen[[id number]]                            In process                   Please view results for these tests on the individual orders.     Labs Drawn and in Process:   Unresulted Labs Ordered in the Past 30 Days of this Admission     Date and Time Order Name Status Description    1/25/2022  9:36 AM TYPE AND SCREEN, ADULT In process     1/25/2022  9:36 AM COVID-19 VIRUS (CORONAVIRUS) BY PCR In process     1/25/2022  9:36 AM CREATININE In process     1/25/2022  9:36 AM HBV HCV HIV WNV BY RAKEL In process     1/25/2022  9:36 AM HIV ANTIGEN ANTIBODY COMBO PRETRANSPLANT In process     1/25/2022  9:36 AM HEPATITIS C ANTIBODY In process     1/25/2022  9:36 AM HEPATITIS B SURFACE ANTIGEN In process     1/25/2022  9:36 AM HEPATITIS B CORE ANTIBODY In process     1/25/2022  9:36 AM HCG QUANTITATIVE PREGNANCY In process     1/25/2022  9:36 AM HLA FINAL  CROSSMATCH DONOR In process     1/25/2022  9:36 AM EBV CAPSID ANTIBODY IGM In process     1/25/2022  9:36 AM EBV CAPSID ANTIBODY IGG In process     1/25/2022  9:36 AM CMV ANTIBODY IGG In process         Recent Labs   Lab Test 05/13/21  0636 04/09/21  0849   HGB 14.1 13.8     --    INR 1.00  --      --    POTASSIUM 4.1  --    CR 0.88 0.93   A1C 5.2  --       Assessment:                                                    Healthy voluntary kidney donor    There have been no significant intercurrent medical problems or change of intent in proceeding with kidney donation as scheduled on 2/3/2022.    1. Labs reviewed and within normal limits: Yes.  Labs reviewed and discussed with patient  2. EKG (5/13/2021): appears normal, NSR  3. Paired Exchange case: No  4. ABO= O  5. Laterality: left  6. Outstanding issues: Final ABO, cross match, and covid-19 test    Plan:                                                      1. Consent: Not Done  2. Outstanding issues:  Final ABO, cross match, and covid-19 test    Signed Electronically by: Maggie Bonilla NP

## 2022-01-25 NOTE — PROGRESS NOTES
Transplant Surgery H&P                                                        HPI:                                                      Ms. López is a 46 year old female who comes to clinic today for preop prior to planned laparoscopic kidney donation surgery. The patient was previously reviewed by the living donor multidisciplinary selection committee and found to be medically and psychosocially appropriate for voluntary kidney donation. The patient denies any feelings of being pressured to proceed with kidney donation.  Health events since donor evaluation: None    Special considerations:   Constipation: yes  PONV: no  History of Urinary retention? no  Significant neck/back/joint concerns for lateral decubitus positioning?: No  Confucianism: No    MEDICAL HISTORY:                                                      Patient Active Problem List    Diagnosis Date Noted     Donor of kidney for transplant 05/13/2021     Priority: Medium      Past Medical History:   Diagnosis Date     Miscarriage     x2 in the first trimester     Plantar fasciitis      Past Surgical History:   Procedure Laterality Date     APPENDECTOMY       No current outpatient medications on file.     OTC products: None, except as noted above  No Known Allergies   Social History     Tobacco Use     Smoking status: Never Smoker     Smokeless tobacco: Never Used   Substance Use Topics     Alcohol use: Yes     Alcohol/week: 3.0 - 4.0 standard drinks     Types: 3 - 4 Standard drinks or equivalent per week     History   Drug Use Unknown       REVIEW OF SYSTEMS:                                                    CONSTITUTIONAL: NEGATIVE for fever, chills, change in weight  INTEGUMENTARY/SKIN: NEGATIVE for worrisome rashes, moles or lesions  EYES: NEGATIVE for vision changes or irritation  ENT/MOUTH: NEGATIVE for ear, mouth and throat problems  RESP: NEGATIVE for significant cough or SOB  BREAST: NEGATIVE for masses, tenderness or discharge  CV:  NEGATIVE for chest pain, palpitations or peripheral edema  GI: NEGATIVE for nausea, abdominal pain, heartburn, or change in bowel habits  : NEGATIVE for frequency, dysuria, or hematuria  MUSCULOSKELETAL: NEGATIVE for significant arthralgias or myalgia  NEURO: NEGATIVE for weakness, dizziness or paresthesias  ENDOCRINE: NEGATIVE for temperature intolerance, skin/hair changes  HEME: NEGATIVE for bleeding problems  PSYCHIATRIC: NEGATIVE for changes in mood or affect    EXAM:                                                    There were no vitals taken for this visit.    GENERAL APPEARANCE: healthy, alert and no distress     EYES: EOMI, PERRL     HENT: ear canals and TM's normal and nose and mouth without ulcers or lesions     NECK: no adenopathy, no asymmetry, masses, or scars and thyroid normal to palpation     RESP: lungs clear to auscultation - no rales, rhonchi or wheezes     CV: regular rates and rhythm, normal S1 S2, no S3 or S4 and no murmur, click or rub     ABDOMEN:  soft, nontender, no HSM or masses and bowel sounds normal     MS: extremities normal- no gross deformities noted, no evidence of inflammation in joints, FROM in all extremities.     SKIN: no suspicious lesions or rashes     NEURO: Normal strength and tone, sensory exam grossly normal, mentation intact and speech normal     PSYCH: mentation appears normal. and affect normal/bright     LYMPHATICS: No cervical adenopathy    DIAGNOSTICS:                                                      EKG: appears normal, NSR, normal axis, normal intervals, no acute ST/T changes c/w ischemia, no LVH by voltage criteria, unchanged from previous tracings  Chest XRay  Labs Resulted Today:   Results for orders placed or performed in visit on 01/25/22   UA with Microscopic reflex to Culture     Status: Abnormal    Specimen: Urine, Midstream   Result Value Ref Range    Color Urine Colorless Colorless, Straw, Light Yellow, Yellow    Appearance Urine Clear Clear     Glucose Urine Negative Negative mg/dL    Bilirubin Urine Negative Negative    Ketones Urine Negative Negative mg/dL    Specific Gravity Urine 1.002 (L) 1.003 - 1.035    Blood Urine Negative Negative    pH Urine 6.0 5.0 - 7.0    Protein Albumin Urine Negative Negative mg/dL    Urobilinogen Urine Normal Normal, 2.0 mg/dL    Nitrite Urine Negative Negative    Leukocyte Esterase Urine Negative Negative    Bacteria Urine Few (A) None Seen /HPF    RBC Urine <1 <=2 /HPF    WBC Urine 1 <=5 /HPF    Squamous Epithelials Urine <1 <=1 /HPF    Narrative    Urine Culture not indicated   Hemoglobin     Status: Normal   Result Value Ref Range    Hemoglobin 14.8 11.7 - 15.7 g/dL   ABO/Rh type and screen     Status: None (In process)    Narrative    The following orders were created for panel order ABO/Rh type and screen.  Procedure                               Abnormality         Status                     ---------                               -----------         ------                     Adult Type and Screen[[id number]]                            In process                   Please view results for these tests on the individual orders.     Labs Drawn and in Process:   Unresulted Labs Ordered in the Past 30 Days of this Admission     Date and Time Order Name Status Description    1/25/2022  9:36 AM TYPE AND SCREEN, ADULT In process     1/25/2022  9:36 AM COVID-19 VIRUS (CORONAVIRUS) BY PCR In process     1/25/2022  9:36 AM CREATININE In process     1/25/2022  9:36 AM HBV HCV HIV WNV BY RAKEL In process     1/25/2022  9:36 AM HIV ANTIGEN ANTIBODY COMBO PRETRANSPLANT In process     1/25/2022  9:36 AM HEPATITIS C ANTIBODY In process     1/25/2022  9:36 AM HEPATITIS B SURFACE ANTIGEN In process     1/25/2022  9:36 AM HEPATITIS B CORE ANTIBODY In process     1/25/2022  9:36 AM HCG QUANTITATIVE PREGNANCY In process     1/25/2022  9:36 AM HLA FINAL CROSSMATCH DONOR In process     1/25/2022  9:36 AM EBV CAPSID ANTIBODY IGM In process      1/25/2022  9:36 AM EBV CAPSID ANTIBODY IGG In process     1/25/2022  9:36 AM CMV ANTIBODY IGG In process         Recent Labs   Lab Test 05/13/21  0636 04/09/21  0849   HGB 14.1 13.8     --    INR 1.00  --      --    POTASSIUM 4.1  --    CR 0.88 0.93   A1C 5.2  --       Assessment:                                                    Healthy voluntary kidney donor    There have been no significant intercurrent medical problems or change of intent in proceeding with kidney donation as scheduled on 2/3/2022.    1. Labs reviewed and within normal limits: Yes.  Labs reviewed and discussed with patient  2. EKG (5/13/2021): appears normal, NSR  3. Paired Exchange case: No  4. ABO= O  5. Laterality: left  6. Outstanding issues: Final ABO, cross match, and covid-19 test    Plan:                                                      1. Consent: Not Done  2. Outstanding issues:  Final ABO, cross match, and covid-19 test    Signed Electronically by: Maggie Bonilla NP

## 2022-01-25 NOTE — NURSING NOTE
"Saw Zora in clinic for pre-op visit.  Surgery is scheduled for 2/3/22..     Expectations for day of surgery and hospitalization, \"What to expect after donation,\" ERAS, pre op shower instructions, and importance of post op follow up reviewed. Insentive Spirometer provided and instructions for use reviewed. All questions addressed.     Updated risk behavior questionnaire obtained and sent for scan. Ensure and pre op scrub provided.     Thanked Zora for being a donor. She verbalized understanding and comfort with plan, denied any addtional questions or concerns.    Maggie Bonilla, SUN notified of weight gain, she will assess during pre op.     "

## 2022-01-25 NOTE — LETTER
1/25/2022         RE: Zora López  [address] [facility] [address]        Dear Colleague,    Thank you for referring your patient, Zora López, to the [facility]. Please see a copy of my visit note below.    Transplant Surgery Consult Note    Medical record number: [id number]  YOB: 1975,   Consult requested by the patient for evaluation of kidney donation candidacy.    Assessment and Recommendations: Ms. López appears to be a good candidate for kidney donation at this point in the evaluation. The following issues will need to be addressed prior to formal review:    I personally saw and reviewed all imaging and labs with Dania Woody.  She has remained healthy.  She is in her normal state of health and has no symptoms consistent with COVID-19 infection.  She is donating to her father.  Their blood type compatible and the most recent crossmatch is negative.  I will plan for a laparoscopic hand-assisted left donor nephrectomy.  Consent was obtained.  All risks were discussed again.  All questions were answered.  She was in agreement and wished to proceed.     The majority of our visit today was spent in counseling regarding the medical and surgical risks of kidney donation, the typical sumit-and post-operative experience and recovery/return to work pattern.  We also talked about post-op visits and longer term health care maintenance, as well as the implications of having one remaining kidney. This discussion included, but was not limited to rates of complications such as bleeding, infection, need for transfusion, reoperation, other organ injury, future bowel obstruction, incisional hernia, port site pain, varicocele, venous thrombosis, pulmonary embolism, renal failure, and death (3 per 10,000). The patient understands that if end stage renal failure happens that dialysis or transplant would be required.   At the conclusion of the visit, all  questions had been answered and Ms. López's candidacy for donation will be reviewed at our Multidisciplinary Donor Selection Committee. She will stay in contact with the nurse coordinator with any concerns.      Total time: 35 minutes  Counselling time: 30 minutes      Florence King MD FACS  Assistant Professor of Surgery  Director, Living Kidney Donor Program.  ---------------------------------------------------------------------------------------------------    HPI: Zora López is a 46 year old year old female who presents for a kidney donor evaluation.  Patient would like to donate to her father.      Personal history of:   No    Yes  Cancer:    [x]      []             Comment:     Diabetes   [x]      []  Comment:    Thombosis   [x]      []  Comment:       Hepatitis   [x]      []  Comment:    Tuberculosis   [x]      []  Comment:   Back or neck pain:  [x]      []  Comment:      Kidney stones   [x]      []  Comment:                  Kidney infections  [x]      []  Comment:           Urinary retention  [x]      []            Comment:   Regular NSAID use:  [x]      []            Comment:      Constipation:   [x]      []            Comment:      Rastafarian  []      []            Comment:      Other:    []      [x]            Comment: Obesity but she has lost weight.       Past Medical History:   Diagnosis Date     Miscarriage     x2 in the first trimester     Plantar fasciitis      Past Surgical History:   Procedure Laterality Date     APPENDECTOMY       LAPAROSCOPIC DONOR HAND ASSISTED KIDNEY LIVING RELATED N/A 2/3/2022    Procedure: Laparoscopic Hand Assisted Living Related Kidney Donor;  Surgeon: Florence King MD;  Location: [facility] OR     Family History   Problem Relation Age of Onset     Melanoma Mother      Diabetes Father      Hypertension Father      Kidney Disease Father      Diabetes Brother      Myocardial Infarction Paternal Grandfather      Leukemia Maternal Grandmother       Social History     Socioeconomic History     Marital status:      Spouse name: Not on file     Number of children: 2     Years of education: Not on file     Highest education level: Not on file   Occupational History     Not on file   Tobacco Use     Smoking status: Never Smoker     Smokeless tobacco: Never Used   Substance and Sexual Activity     Alcohol use: Yes     Alcohol/week: 3.0 - 4.0 standard drinks     Types: 3 - 4 Standard drinks or equivalent per week     Comment: 3-4 drinks a week     Drug use: Never     Sexual activity: Not on file   Other Topics Concern     Not on file   Social History Narrative     Not on file     Social Determinants of Health     Financial Resource Strain: Not on file   Food Insecurity: Not on file   Transportation Needs: Not on file   Physical Activity: Not on file   Stress: Not on file   Social Connections: Not on file   Intimate Partner Violence: Not on file   Housing Stability: Not on file       ROS:   CONSTITUTIONAL:  No fevers or chills  EYES: negative for icterus  ENT:  negative for hearing loss, tinnitus and sore throat  RESPIRATORY:  negative for cough, sputum, dyspnea  CARDIOVASCULAR:  negative for chest pain  GASTROINTESTINAL:  negative for nausea, vomiting, diarrhea or constipation  GENITOURINARY:  negative for incontinence, dysuria, bladder emptying problems  HEME:  No easy bruising  INTEGUMENT:  negative for rash and pruritus  NEURO:  Negative for headache, seizure disorder    Allergies:   No Known Allergies    Medications:  Prescription Medications as of 2/7/2022       Rx Number Disp Refills Start End Last Dispensed Date Next Fill Date Owning Pharmacy    acetaminophen (TYLENOL) 325 MG tablet  60 tablet 0 2/4/2022    [facility] [place], [place] - [address]    Sig: Take 2 tablets (650 mg) by mouth every 6 hours    Class: E-Prescribe    Route: Oral    ondansetron (ZOFRAN-ODT) 4 MG ODT tab  10 tablet 0 2/4/2022    [facility]  [address]    Sig: Take 1 tablet (4 mg) by mouth every 6 hours as needed for nausea or vomiting    Class: E-Prescribe    Route: Oral    oxyCODONE (ROXICODONE) 5 MG tablet  15 tablet 0 2/4/2022    [address]    Sig: Take 1-2 tablets (5-10 mg) by mouth every 4 hours as needed for moderate to severe pain    Class: E-Prescribe    Earliest Fill Date: 2/4/2022    Route: Oral    senna-docusate (SENOKOT-S/PERICOLACE) 8.6-50 MG tablet  60 tablet 0 2/4/2022    [address]    Sig: Take 1-2 tablets by mouth 2 times daily as needed for constipation    Class: E-Prescribe    Route: Oral          Exam:      There is no height or weight on file to calculate BMI.     Appearance: in no apparent distress.   Skin: normal  Head and Neck: Normal, no rashes or jaundice  Respiratory: normal respiratory excursions, no audible wheeze  Cardiovascular: RRR  Abdomen: rounded and obese, No distention and surgical scars consistent with appendectomy history.  Extremeties: Edema, none  Neuro: without deficit     Lab on 01/25/2022   Component Date Value Ref Range Status     CMV Julia IgG Instrument Value 01/25/2022 <0.20  <0.60 U/mL Final     CMV Antibody IgG 01/25/2022 No detectable antibody.  No detectable antibody.  Final     EBV Capsid Julia IgG Instrument Value 01/25/2022 <10.0  <18.0 U/mL Final     EBV Capsid Antibody IgG 01/25/2022 No detectable antibody.  No detectable antibody. Final     EBV Capsid Julia IgM Instrument Value 01/25/2022 <10.0  <36.0 U/mL Final     EBV Capsid Antibody IgM 01/25/2022 No detectable antibody.  No detectable antibody. Final     hCG Quantitative 01/25/2022 <1  0 - 5 IU/L Final    Adult: 0-5 IU/L for healthy non-pregnant person  Neonates: Should be within normal ranges by 2 days after birth     Color Urine 01/25/2022 Colorless  Colorless, Straw, Light Yellow,  Yellow Final     Appearance Urine 01/25/2022 Clear  Clear Final     Glucose Urine 01/25/2022 Negative  Negative mg/dL Final     Bilirubin Urine 01/25/2022 Negative  Negative Final     Ketones Urine 01/25/2022 Negative  Negative mg/dL Final     Specific Gravity Urine 01/25/2022 1.002* 1.003 - 1.035 Final     Blood Urine 01/25/2022 Negative  Negative Final     pH Urine 01/25/2022 6.0  5.0 - 7.0 Final     Protein Albumin Urine 01/25/2022 Negative  Negative mg/dL Final     Urobilinogen Urine 01/25/2022 Normal  Normal, 2.0 mg/dL Final     Nitrite Urine 01/25/2022 Negative  Negative Final     Leukocyte Esterase Urine 01/25/2022 Negative  Negative Final     Bacteria Urine 01/25/2022 Few* None Seen /HPF Final     RBC Urine 01/25/2022 <1  <=2 /HPF Final     WBC Urine 01/25/2022 1  <=5 /HPF Final     Squamous Epithelials Urine 01/25/2022 <1  <=1 /HPF Final     Hemoglobin 01/25/2022 14.8  11.7 - 15.7 g/dL Final     Hepatitis B Core Antibody Total 01/25/2022 Nonreactive  Nonreactive Final     Hepatitis B Surface Antigen 01/25/2022 Nonreactive  Nonreactive Final     Hepatitis C Antibody 01/25/2022 Nonreactive  Nonreactive Final     HIV Antigen Antibody Combo Pretran* 01/25/2022 Nonreactive  Nonreactive Final    HIV-1 p24 Ag & HIV-1/HIV-2 Ab Not Detected     HEPATITIS B BY RAKEL 01/25/2022 Non-Reactive   Final     HCV by RAKEL 01/25/2022 Non-Reactive   Final     HIV By Rakel 01/25/2022 Non-Reactive   Final     West Nile Virus By RAKEL 01/25/2022 Non-Reactive   Final     Creatinine 01/25/2022 0.82  0.52 - 1.04 mg/dL Final     GFR Estimate 01/25/2022 89  >60 mL/min/1.73m2 Final    Effective December 21, 2021 eGFRcr in adults is calculated using the 2021 CKD-EPI creatinine equation which includes age and gender (Fatuma harvey al., NEJ, DOI: 10.1056/KHSAvu0699666)     SARS CoV2 PCR 01/25/2022 Negative  Negative, Testing sent to reference lab. Results will be returned via unsolicited result Final    NEGATIVE: SARS-CoV-2 (COVID-19) RNA not  detected, presumed negative.     ABO/RH(D) 01/25/2022 O POS   Final     Antibody Screen 01/25/2022 Negative  Negative Final     SPECIMEN EXPIRATION DATE 01/25/2022 [id number]   Final               Again, thank you for allowing me to participate in the care of your patient.        Sincerely,        Florence King MD, MD

## 2022-01-26 LAB — SARS-COV-2 RNA RESP QL NAA+PROBE: NEGATIVE

## 2022-01-28 LAB
HBV DNA SERPL QL NAA+PROBE: NORMAL
HCV RNA SERPL QL NAA+PROBE: NORMAL
HIV1+2 RNA SERPL QL NAA+PROBE: NORMAL
WNV RNA SERPL DONR QL NAA+PROBE: NORMAL

## 2022-02-01 ENCOUNTER — LAB (OUTPATIENT)
Dept: LAB | Facility: CLINIC | Age: 47
End: 2022-02-01
Attending: SURGERY

## 2022-02-01 ENCOUNTER — ANCILLARY PROCEDURE (OUTPATIENT)
Dept: GENERAL RADIOLOGY | Facility: CLINIC | Age: 47
End: 2022-02-01
Attending: SURGERY

## 2022-02-01 DIAGNOSIS — Z52.4 ENCOUNTER FOR DONATION OF KIDNEY: ICD-10-CM

## 2022-02-01 LAB — SARS-COV-2 RNA RESP QL NAA+PROBE: NEGATIVE

## 2022-02-01 PROCEDURE — 71046 X-RAY EXAM CHEST 2 VIEWS: CPT | Mod: GC | Performed by: RADIOLOGY

## 2022-02-01 PROCEDURE — U0005 INFEC AGEN DETEC AMPLI PROBE: HCPCS | Mod: 90 | Performed by: PATHOLOGY

## 2022-02-01 PROCEDURE — 99000 SPECIMEN HANDLING OFFICE-LAB: CPT | Performed by: PATHOLOGY

## 2022-02-01 PROCEDURE — U0003 INFECTIOUS AGENT DETECTION BY NUCLEIC ACID (DNA OR RNA); SEVERE ACUTE RESPIRATORY SYNDROME CORONAVIRUS 2 (SARS-COV-2) (CORONAVIRUS DISEASE [COVID-19]), AMPLIFIED PROBE TECHNIQUE, MAKING USE OF HIGH THROUGHPUT TECHNOLOGIES AS DESCRIBED BY CMS-2020-01-R: HCPCS | Mod: 90 | Performed by: PATHOLOGY

## 2022-02-02 ENCOUNTER — ANESTHESIA EVENT (OUTPATIENT)
Dept: SURGERY | Facility: CLINIC | Age: 47
DRG: 661 | End: 2022-02-02

## 2022-02-03 ENCOUNTER — ANCILLARY PROCEDURE (OUTPATIENT)
Dept: ULTRASOUND IMAGING | Facility: CLINIC | Age: 47
End: 2022-02-03
Attending: STUDENT IN AN ORGANIZED HEALTH CARE EDUCATION/TRAINING PROGRAM
Payer: COMMERCIAL

## 2022-02-03 ENCOUNTER — HOSPITAL ENCOUNTER (INPATIENT)
Facility: CLINIC | Age: 47
LOS: 3 days | Discharge: HOME | DRG: 661 | End: 2022-02-06
Attending: SURGERY | Admitting: SURGERY
Payer: COMMERCIAL

## 2022-02-03 ENCOUNTER — DOCUMENTATION ONLY (OUTPATIENT)
Dept: TRANSPLANT | Facility: CLINIC | Age: 47
End: 2022-02-03
Payer: COMMERCIAL

## 2022-02-03 ENCOUNTER — ANESTHESIA (OUTPATIENT)
Dept: SURGERY | Facility: CLINIC | Age: 47
DRG: 661 | End: 2022-02-03

## 2022-02-03 DIAGNOSIS — Z52.4 ENCOUNTER FOR DONATION OF KIDNEY: ICD-10-CM

## 2022-02-03 DIAGNOSIS — Z52.4 DONOR OF KIDNEY FOR TRANSPLANT: ICD-10-CM

## 2022-02-03 DIAGNOSIS — G89.18 ACUTE POST-OPERATIVE PAIN: Primary | ICD-10-CM

## 2022-02-03 LAB
CK SERPL-CCNC: 205 U/L (ref 30–225)
ERYTHROCYTE [DISTWIDTH] IN BLOOD BY AUTOMATED COUNT: 12.1 % (ref 10–15)
GLUCOSE BLDC GLUCOMTR-MCNC: 84 MG/DL (ref 70–99)
HCT VFR BLD AUTO: 40.2 % (ref 35–47)
HGB BLD-MCNC: 13.2 G/DL (ref 11.7–15.7)
HOLD SPECIMEN: NORMAL
MCH RBC QN AUTO: 30.5 PG (ref 26.5–33)
MCHC RBC AUTO-ENTMCNC: 32.8 G/DL (ref 31.5–36.5)
MCV RBC AUTO: 93 FL (ref 78–100)
PLATELET # BLD AUTO: 224 10E3/UL (ref 150–450)
RBC # BLD AUTO: 4.33 10E6/UL (ref 3.8–5.2)
WBC # BLD AUTO: 22.6 10E3/UL (ref 4–11)

## 2022-02-03 PROCEDURE — 50547 LAPARO REMOVAL DONOR KIDNEY: CPT | Mod: LT | Performed by: SURGERY

## 2022-02-03 PROCEDURE — 250N000013 HC RX MED GY IP 250 OP 250 PS 637: Performed by: STUDENT IN AN ORGANIZED HEALTH CARE EDUCATION/TRAINING PROGRAM

## 2022-02-03 PROCEDURE — 999N000141 HC STATISTIC PRE-PROCEDURE NURSING ASSESSMENT: Performed by: SURGERY

## 2022-02-03 PROCEDURE — 258N000003 HC RX IP 258 OP 636: Performed by: STUDENT IN AN ORGANIZED HEALTH CARE EDUCATION/TRAINING PROGRAM

## 2022-02-03 PROCEDURE — 250N000011 HC RX IP 250 OP 636: Performed by: STUDENT IN AN ORGANIZED HEALTH CARE EDUCATION/TRAINING PROGRAM

## 2022-02-03 PROCEDURE — 710N000010 HC RECOVERY PHASE 1, LEVEL 2, PER MIN: Performed by: SURGERY

## 2022-02-03 PROCEDURE — 88240 CELL CRYOPRESERVE/STORAGE: CPT | Performed by: STUDENT IN AN ORGANIZED HEALTH CARE EDUCATION/TRAINING PROGRAM

## 2022-02-03 PROCEDURE — 36415 COLL VENOUS BLD VENIPUNCTURE: CPT | Performed by: NURSE PRACTITIONER

## 2022-02-03 PROCEDURE — 250N000025 HC SEVOFLURANE, PER MIN: Performed by: SURGERY

## 2022-02-03 PROCEDURE — 360N000077 HC SURGERY LEVEL 4, PER MIN: Performed by: SURGERY

## 2022-02-03 PROCEDURE — 250N000011 HC RX IP 250 OP 636: Performed by: ANESTHESIOLOGY

## 2022-02-03 PROCEDURE — 0TT10ZZ RESECTION OF LEFT KIDNEY, OPEN APPROACH: ICD-10-PCS | Performed by: SURGERY

## 2022-02-03 PROCEDURE — 250N000009 HC RX 250: Performed by: NURSE ANESTHETIST, CERTIFIED REGISTERED

## 2022-02-03 PROCEDURE — 250N000009 HC RX 250: Performed by: ANESTHESIOLOGY

## 2022-02-03 PROCEDURE — 370N000017 HC ANESTHESIA TECHNICAL FEE, PER MIN: Performed by: SURGERY

## 2022-02-03 PROCEDURE — 272N000001 HC OR GENERAL SUPPLY STERILE: Performed by: SURGERY

## 2022-02-03 PROCEDURE — 250N000011 HC RX IP 250 OP 636: Performed by: SURGERY

## 2022-02-03 PROCEDURE — C9290 INJ, BUPIVACAINE LIPOSOME: HCPCS | Performed by: ANESTHESIOLOGY

## 2022-02-03 PROCEDURE — 120N000011 HC R&B TRANSPLANT UMMC

## 2022-02-03 PROCEDURE — 36415 COLL VENOUS BLD VENIPUNCTURE: CPT | Performed by: STUDENT IN AN ORGANIZED HEALTH CARE EDUCATION/TRAINING PROGRAM

## 2022-02-03 PROCEDURE — 85027 COMPLETE CBC AUTOMATED: CPT | Performed by: NURSE PRACTITIONER

## 2022-02-03 PROCEDURE — 82550 ASSAY OF CK (CPK): CPT | Performed by: NURSE PRACTITIONER

## 2022-02-03 RX ORDER — MANNITOL 250 MG/ML
INJECTION, SOLUTION INTRAVENOUS PRN
Status: DISCONTINUED | OUTPATIENT
Start: 2022-02-03 | End: 2022-02-03

## 2022-02-03 RX ORDER — FENTANYL CITRATE 50 UG/ML
10-20 INJECTION, SOLUTION INTRAMUSCULAR; INTRAVENOUS
Status: DISCONTINUED | OUTPATIENT
Start: 2022-02-03 | End: 2022-02-04

## 2022-02-03 RX ORDER — NALOXONE HYDROCHLORIDE 0.4 MG/ML
0.4 INJECTION, SOLUTION INTRAMUSCULAR; INTRAVENOUS; SUBCUTANEOUS
Status: DISCONTINUED | OUTPATIENT
Start: 2022-02-03 | End: 2022-02-06 | Stop reason: HOSPADM

## 2022-02-03 RX ORDER — FLUMAZENIL 0.1 MG/ML
0.2 INJECTION, SOLUTION INTRAVENOUS
Status: DISCONTINUED | OUTPATIENT
Start: 2022-02-03 | End: 2022-02-03 | Stop reason: HOSPADM

## 2022-02-03 RX ORDER — CEFUROXIME SODIUM 1.5 G/16ML
1.5 INJECTION, POWDER, FOR SOLUTION INTRAVENOUS
Status: DISCONTINUED | OUTPATIENT
Start: 2022-02-03 | End: 2022-02-03 | Stop reason: HOSPADM

## 2022-02-03 RX ORDER — LIDOCAINE HYDROCHLORIDE 20 MG/ML
INJECTION, SOLUTION INFILTRATION; PERINEURAL PRN
Status: DISCONTINUED | OUTPATIENT
Start: 2022-02-03 | End: 2022-02-03

## 2022-02-03 RX ORDER — DEXAMETHASONE SODIUM PHOSPHATE 4 MG/ML
8 INJECTION, SOLUTION INTRA-ARTICULAR; INTRALESIONAL; INTRAMUSCULAR; INTRAVENOUS; SOFT TISSUE ONCE
Status: DISCONTINUED | OUTPATIENT
Start: 2022-02-03 | End: 2022-02-03 | Stop reason: HOSPADM

## 2022-02-03 RX ORDER — ONDANSETRON 4 MG/1
4 TABLET, ORALLY DISINTEGRATING ORAL EVERY 6 HOURS PRN
Status: DISCONTINUED | OUTPATIENT
Start: 2022-02-03 | End: 2022-02-06 | Stop reason: HOSPADM

## 2022-02-03 RX ORDER — ALBUTEROL SULFATE 0.83 MG/ML
2.5 SOLUTION RESPIRATORY (INHALATION) EVERY 4 HOURS PRN
Status: DISCONTINUED | OUTPATIENT
Start: 2022-02-03 | End: 2022-02-03 | Stop reason: HOSPADM

## 2022-02-03 RX ORDER — ONDANSETRON 4 MG/1
4 TABLET, ORALLY DISINTEGRATING ORAL EVERY 6 HOURS PRN
Status: DISCONTINUED | OUTPATIENT
Start: 2022-02-03 | End: 2022-02-03 | Stop reason: HOSPADM

## 2022-02-03 RX ORDER — NALOXONE HYDROCHLORIDE 0.4 MG/ML
0.4 INJECTION, SOLUTION INTRAMUSCULAR; INTRAVENOUS; SUBCUTANEOUS
Status: DISCONTINUED | OUTPATIENT
Start: 2022-02-03 | End: 2022-02-03 | Stop reason: HOSPADM

## 2022-02-03 RX ORDER — FENTANYL CITRATE 50 UG/ML
25 INJECTION, SOLUTION INTRAMUSCULAR; INTRAVENOUS
Status: DISCONTINUED | OUTPATIENT
Start: 2022-02-03 | End: 2022-02-03 | Stop reason: HOSPADM

## 2022-02-03 RX ORDER — ACETAMINOPHEN 325 MG/1
975 TABLET ORAL ONCE
Status: COMPLETED | OUTPATIENT
Start: 2022-02-03 | End: 2022-02-03

## 2022-02-03 RX ORDER — NALOXONE HYDROCHLORIDE 0.4 MG/ML
0.2 INJECTION, SOLUTION INTRAMUSCULAR; INTRAVENOUS; SUBCUTANEOUS
Status: DISCONTINUED | OUTPATIENT
Start: 2022-02-03 | End: 2022-02-06 | Stop reason: HOSPADM

## 2022-02-03 RX ORDER — ONDANSETRON 2 MG/ML
4 INJECTION INTRAMUSCULAR; INTRAVENOUS EVERY 30 MIN PRN
Status: DISCONTINUED | OUTPATIENT
Start: 2022-02-03 | End: 2022-02-03 | Stop reason: HOSPADM

## 2022-02-03 RX ORDER — GABAPENTIN 300 MG/1
300 CAPSULE ORAL ONCE
Status: COMPLETED | OUTPATIENT
Start: 2022-02-03 | End: 2022-02-03

## 2022-02-03 RX ORDER — BISACODYL 10 MG
10 SUPPOSITORY, RECTAL RECTAL DAILY
Status: DISCONTINUED | OUTPATIENT
Start: 2022-02-04 | End: 2022-02-06 | Stop reason: HOSPADM

## 2022-02-03 RX ORDER — MAGNESIUM SULFATE HEPTAHYDRATE 40 MG/ML
2 INJECTION, SOLUTION INTRAVENOUS ONCE
Status: COMPLETED | OUTPATIENT
Start: 2022-02-03 | End: 2022-02-03

## 2022-02-03 RX ORDER — PROTAMINE SULFATE 10 MG/ML
3 INJECTION, SOLUTION INTRAVENOUS ONCE
Status: COMPLETED | OUTPATIENT
Start: 2022-02-03 | End: 2022-02-03

## 2022-02-03 RX ORDER — ONDANSETRON 2 MG/ML
4 INJECTION INTRAMUSCULAR; INTRAVENOUS ONCE
Status: DISCONTINUED | OUTPATIENT
Start: 2022-02-03 | End: 2022-02-03 | Stop reason: HOSPADM

## 2022-02-03 RX ORDER — LABETALOL HYDROCHLORIDE 5 MG/ML
5 INJECTION, SOLUTION INTRAVENOUS
Status: DISCONTINUED | OUTPATIENT
Start: 2022-02-03 | End: 2022-02-03 | Stop reason: HOSPADM

## 2022-02-03 RX ORDER — OXYCODONE HYDROCHLORIDE 5 MG/1
5 TABLET ORAL EVERY 4 HOURS PRN
Status: DISCONTINUED | OUTPATIENT
Start: 2022-02-03 | End: 2022-02-03 | Stop reason: HOSPADM

## 2022-02-03 RX ORDER — SODIUM CHLORIDE, SODIUM LACTATE, POTASSIUM CHLORIDE, CALCIUM CHLORIDE 600; 310; 30; 20 MG/100ML; MG/100ML; MG/100ML; MG/100ML
1-3 INJECTION, SOLUTION INTRAVENOUS CONTINUOUS
Status: DISCONTINUED | OUTPATIENT
Start: 2022-02-03 | End: 2022-02-03 | Stop reason: HOSPADM

## 2022-02-03 RX ORDER — ONDANSETRON 2 MG/ML
INJECTION INTRAMUSCULAR; INTRAVENOUS PRN
Status: DISCONTINUED | OUTPATIENT
Start: 2022-02-03 | End: 2022-02-03

## 2022-02-03 RX ORDER — PROCHLORPERAZINE MALEATE 5 MG
10 TABLET ORAL EVERY 6 HOURS PRN
Status: DISCONTINUED | OUTPATIENT
Start: 2022-02-03 | End: 2022-02-03 | Stop reason: HOSPADM

## 2022-02-03 RX ORDER — NALOXONE HYDROCHLORIDE 0.4 MG/ML
0.2 INJECTION, SOLUTION INTRAMUSCULAR; INTRAVENOUS; SUBCUTANEOUS
Status: DISCONTINUED | OUTPATIENT
Start: 2022-02-03 | End: 2022-02-03 | Stop reason: HOSPADM

## 2022-02-03 RX ORDER — HEPARIN SODIUM 5000 [USP'U]/.5ML
5000 INJECTION, SOLUTION INTRAVENOUS; SUBCUTANEOUS ONCE
Status: COMPLETED | OUTPATIENT
Start: 2022-02-03 | End: 2022-02-03

## 2022-02-03 RX ORDER — KETOROLAC TROMETHAMINE 15 MG/ML
10 INJECTION, SOLUTION INTRAMUSCULAR; INTRAVENOUS EVERY 8 HOURS
Status: COMPLETED | OUTPATIENT
Start: 2022-02-03 | End: 2022-02-05

## 2022-02-03 RX ORDER — ONDANSETRON 2 MG/ML
4 INJECTION INTRAMUSCULAR; INTRAVENOUS EVERY 6 HOURS PRN
Status: DISCONTINUED | OUTPATIENT
Start: 2022-02-03 | End: 2022-02-06 | Stop reason: HOSPADM

## 2022-02-03 RX ORDER — MEPERIDINE HYDROCHLORIDE 25 MG/ML
12.5 INJECTION INTRAMUSCULAR; INTRAVENOUS; SUBCUTANEOUS
Status: DISCONTINUED | OUTPATIENT
Start: 2022-02-03 | End: 2022-02-03 | Stop reason: HOSPADM

## 2022-02-03 RX ORDER — HYDROMORPHONE HCL IN WATER/PF 6 MG/30 ML
0.2 PATIENT CONTROLLED ANALGESIA SYRINGE INTRAVENOUS EVERY 10 MIN PRN
Status: DISCONTINUED | OUTPATIENT
Start: 2022-02-03 | End: 2022-02-03 | Stop reason: HOSPADM

## 2022-02-03 RX ORDER — FUROSEMIDE 10 MG/ML
INJECTION INTRAMUSCULAR; INTRAVENOUS PRN
Status: DISCONTINUED | OUTPATIENT
Start: 2022-02-03 | End: 2022-02-03

## 2022-02-03 RX ORDER — HEPARIN SODIUM 1000 [USP'U]/ML
5000 INJECTION, SOLUTION INTRAVENOUS; SUBCUTANEOUS ONCE
Status: COMPLETED | OUTPATIENT
Start: 2022-02-03 | End: 2022-02-03

## 2022-02-03 RX ORDER — FENTANYL CITRATE 50 UG/ML
INJECTION, SOLUTION INTRAMUSCULAR; INTRAVENOUS PRN
Status: DISCONTINUED | OUTPATIENT
Start: 2022-02-03 | End: 2022-02-03

## 2022-02-03 RX ORDER — DEXAMETHASONE SODIUM PHOSPHATE 4 MG/ML
INJECTION, SOLUTION INTRA-ARTICULAR; INTRALESIONAL; INTRAMUSCULAR; INTRAVENOUS; SOFT TISSUE PRN
Status: DISCONTINUED | OUTPATIENT
Start: 2022-02-03 | End: 2022-02-03

## 2022-02-03 RX ORDER — KETOROLAC TROMETHAMINE 30 MG/ML
INJECTION, SOLUTION INTRAMUSCULAR; INTRAVENOUS PRN
Status: DISCONTINUED | OUTPATIENT
Start: 2022-02-03 | End: 2022-02-03

## 2022-02-03 RX ORDER — BUPIVACAINE HYDROCHLORIDE 2.5 MG/ML
INJECTION, SOLUTION EPIDURAL; INFILTRATION; INTRACAUDAL PRN
Status: DISCONTINUED | OUTPATIENT
Start: 2022-02-03 | End: 2022-02-03 | Stop reason: HOSPADM

## 2022-02-03 RX ORDER — GLYCOPYRROLATE 0.2 MG/ML
INJECTION, SOLUTION INTRAMUSCULAR; INTRAVENOUS PRN
Status: DISCONTINUED | OUTPATIENT
Start: 2022-02-03 | End: 2022-02-03

## 2022-02-03 RX ORDER — BUPIVACAINE HYDROCHLORIDE 2.5 MG/ML
INJECTION, SOLUTION EPIDURAL; INFILTRATION; INTRACAUDAL
Status: COMPLETED | OUTPATIENT
Start: 2022-02-03 | End: 2022-02-03

## 2022-02-03 RX ORDER — ONDANSETRON 4 MG/1
4 TABLET, ORALLY DISINTEGRATING ORAL EVERY 30 MIN PRN
Status: DISCONTINUED | OUTPATIENT
Start: 2022-02-03 | End: 2022-02-03 | Stop reason: HOSPADM

## 2022-02-03 RX ORDER — PROPOFOL 10 MG/ML
INJECTION, EMULSION INTRAVENOUS PRN
Status: DISCONTINUED | OUTPATIENT
Start: 2022-02-03 | End: 2022-02-03

## 2022-02-03 RX ORDER — FAMOTIDINE 20 MG/1
20 TABLET, FILM COATED ORAL DAILY
Status: DISCONTINUED | OUTPATIENT
Start: 2022-02-03 | End: 2022-02-06 | Stop reason: HOSPADM

## 2022-02-03 RX ORDER — PROCHLORPERAZINE MALEATE 10 MG
10 TABLET ORAL EVERY 6 HOURS PRN
Status: DISCONTINUED | OUTPATIENT
Start: 2022-02-03 | End: 2022-02-06 | Stop reason: HOSPADM

## 2022-02-03 RX ORDER — ALBUMIN, HUMAN INJ 5% 5 %
250 SOLUTION INTRAVENOUS EVERY 10 MIN PRN
Status: DISCONTINUED | OUTPATIENT
Start: 2022-02-03 | End: 2022-02-03 | Stop reason: HOSPADM

## 2022-02-03 RX ORDER — FENTANYL CITRATE 50 UG/ML
25 INJECTION, SOLUTION INTRAMUSCULAR; INTRAVENOUS EVERY 5 MIN PRN
Status: DISCONTINUED | OUTPATIENT
Start: 2022-02-03 | End: 2022-02-03 | Stop reason: HOSPADM

## 2022-02-03 RX ORDER — FENTANYL CITRATE 50 UG/ML
25-50 INJECTION, SOLUTION INTRAMUSCULAR; INTRAVENOUS
Status: DISCONTINUED | OUTPATIENT
Start: 2022-02-03 | End: 2022-02-03 | Stop reason: HOSPADM

## 2022-02-03 RX ORDER — OXYCODONE HYDROCHLORIDE 5 MG/1
5-10 TABLET ORAL
Status: DISCONTINUED | OUTPATIENT
Start: 2022-02-03 | End: 2022-02-06 | Stop reason: HOSPADM

## 2022-02-03 RX ORDER — ACETAMINOPHEN 325 MG/1
650 TABLET ORAL EVERY 6 HOURS
Status: COMPLETED | OUTPATIENT
Start: 2022-02-03 | End: 2022-02-05

## 2022-02-03 RX ORDER — AMOXICILLIN 250 MG
2 CAPSULE ORAL 2 TIMES DAILY
Status: DISCONTINUED | OUTPATIENT
Start: 2022-02-03 | End: 2022-02-06 | Stop reason: HOSPADM

## 2022-02-03 RX ORDER — CEFUROXIME SODIUM 1.5 G/16ML
1.5 INJECTION, POWDER, FOR SOLUTION INTRAVENOUS
Status: COMPLETED | OUTPATIENT
Start: 2022-02-03 | End: 2022-02-03

## 2022-02-03 RX ORDER — ONDANSETRON 2 MG/ML
4 INJECTION INTRAMUSCULAR; INTRAVENOUS EVERY 6 HOURS PRN
Status: DISCONTINUED | OUTPATIENT
Start: 2022-02-03 | End: 2022-02-03 | Stop reason: HOSPADM

## 2022-02-03 RX ORDER — DEXTROSE, SODIUM CHLORIDE, SODIUM LACTATE, POTASSIUM CHLORIDE, AND CALCIUM CHLORIDE 5; .6; .31; .03; .02 G/100ML; G/100ML; G/100ML; G/100ML; G/100ML
INJECTION, SOLUTION INTRAVENOUS CONTINUOUS
Status: DISCONTINUED | OUTPATIENT
Start: 2022-02-03 | End: 2022-02-04

## 2022-02-03 RX ORDER — HYDRALAZINE HYDROCHLORIDE 20 MG/ML
2.5-5 INJECTION INTRAMUSCULAR; INTRAVENOUS EVERY 10 MIN PRN
Status: DISCONTINUED | OUTPATIENT
Start: 2022-02-03 | End: 2022-02-03 | Stop reason: HOSPADM

## 2022-02-03 RX ORDER — AMOXICILLIN 250 MG
1 CAPSULE ORAL 2 TIMES DAILY
Status: DISCONTINUED | OUTPATIENT
Start: 2022-02-03 | End: 2022-02-06 | Stop reason: HOSPADM

## 2022-02-03 RX ADMIN — CEFUROXIME 1.5 G: 1.5 INJECTION, POWDER, FOR SOLUTION INTRAVENOUS at 08:15

## 2022-02-03 RX ADMIN — PROPOFOL 200 MG: 10 INJECTION, EMULSION INTRAVENOUS at 07:43

## 2022-02-03 RX ADMIN — HEPARIN SODIUM 5000 UNITS: 10000 INJECTION, SOLUTION INTRAVENOUS; SUBCUTANEOUS at 06:59

## 2022-02-03 RX ADMIN — GABAPENTIN 300 MG: 300 CAPSULE ORAL at 06:56

## 2022-02-03 RX ADMIN — SUGAMMADEX 200 MG: 100 INJECTION, SOLUTION INTRAVENOUS at 11:59

## 2022-02-03 RX ADMIN — SENNOSIDES AND DOCUSATE SODIUM 2 TABLET: 50; 8.6 TABLET ORAL at 20:18

## 2022-02-03 RX ADMIN — SODIUM CHLORIDE, POTASSIUM CHLORIDE, SODIUM LACTATE AND CALCIUM CHLORIDE: 600; 310; 30; 20 INJECTION, SOLUTION INTRAVENOUS at 10:56

## 2022-02-03 RX ADMIN — SODIUM CHLORIDE, SODIUM LACTATE, POTASSIUM CHLORIDE, CALCIUM CHLORIDE AND DEXTROSE MONOHYDRATE: 5; 600; 310; 30; 20 INJECTION, SOLUTION INTRAVENOUS at 12:27

## 2022-02-03 RX ADMIN — MAGNESIUM SULFATE HEPTAHYDRATE 2 G: 40 INJECTION, SOLUTION INTRAVENOUS at 08:14

## 2022-02-03 RX ADMIN — SODIUM CHLORIDE, POTASSIUM CHLORIDE, SODIUM LACTATE AND CALCIUM CHLORIDE: 600; 310; 30; 20 INJECTION, SOLUTION INTRAVENOUS at 08:33

## 2022-02-03 RX ADMIN — MANNITOL 12.5 G: 12.5 INJECTION, SOLUTION INTRAVENOUS at 10:12

## 2022-02-03 RX ADMIN — FENTANYL CITRATE 25 MCG: 50 INJECTION, SOLUTION INTRAMUSCULAR; INTRAVENOUS at 12:23

## 2022-02-03 RX ADMIN — ACETAMINOPHEN 975 MG: 325 TABLET, FILM COATED ORAL at 06:56

## 2022-02-03 RX ADMIN — FENTANYL CITRATE 50 MCG: 50 INJECTION, SOLUTION INTRAMUSCULAR; INTRAVENOUS at 06:46

## 2022-02-03 RX ADMIN — FENTANYL CITRATE 25 MCG: 50 INJECTION, SOLUTION INTRAMUSCULAR; INTRAVENOUS at 12:34

## 2022-02-03 RX ADMIN — FENTANYL CITRATE 50 MCG: 50 INJECTION, SOLUTION INTRAMUSCULAR; INTRAVENOUS at 09:42

## 2022-02-03 RX ADMIN — OXYCODONE HYDROCHLORIDE 5 MG: 5 TABLET ORAL at 14:45

## 2022-02-03 RX ADMIN — ONDANSETRON 4 MG: 2 INJECTION INTRAMUSCULAR; INTRAVENOUS at 11:36

## 2022-02-03 RX ADMIN — FENTANYL CITRATE 10 MCG: 50 INJECTION, SOLUTION INTRAMUSCULAR; INTRAVENOUS at 17:13

## 2022-02-03 RX ADMIN — ONDANSETRON 4 MG: 2 INJECTION INTRAMUSCULAR; INTRAVENOUS at 07:44

## 2022-02-03 RX ADMIN — FUROSEMIDE 10 MG: 10 INJECTION, SOLUTION INTRAVENOUS at 10:11

## 2022-02-03 RX ADMIN — PROTAMINE SULFATE 50 MG: 10 INJECTION, SOLUTION INTRAVENOUS at 11:11

## 2022-02-03 RX ADMIN — DEXAMETHASONE SODIUM PHOSPHATE 10 MG: 4 INJECTION, SOLUTION INTRA-ARTICULAR; INTRALESIONAL; INTRAMUSCULAR; INTRAVENOUS; SOFT TISSUE at 07:44

## 2022-02-03 RX ADMIN — KETOROLAC TROMETHAMINE 10 MG: 15 INJECTION, SOLUTION INTRAMUSCULAR; INTRAVENOUS at 18:36

## 2022-02-03 RX ADMIN — FENTANYL CITRATE 50 MCG: 50 INJECTION, SOLUTION INTRAMUSCULAR; INTRAVENOUS at 09:50

## 2022-02-03 RX ADMIN — ROCURONIUM BROMIDE 50 MG: 50 INJECTION, SOLUTION INTRAVENOUS at 07:44

## 2022-02-03 RX ADMIN — ACETAMINOPHEN 650 MG: 325 TABLET, FILM COATED ORAL at 14:45

## 2022-02-03 RX ADMIN — HEPARIN SODIUM 5000 UNITS: 1000 INJECTION INTRAVENOUS; SUBCUTANEOUS at 10:43

## 2022-02-03 RX ADMIN — ROCURONIUM BROMIDE 10 MG: 50 INJECTION, SOLUTION INTRAVENOUS at 10:15

## 2022-02-03 RX ADMIN — FENTANYL CITRATE 100 MCG: 50 INJECTION, SOLUTION INTRAMUSCULAR; INTRAVENOUS at 07:35

## 2022-02-03 RX ADMIN — BUPIVACAINE HYDROCHLORIDE 20 ML: 2.5 INJECTION, SOLUTION EPIDURAL; INFILTRATION; INTRACAUDAL; PERINEURAL at 07:18

## 2022-02-03 RX ADMIN — BUPIVACAINE 20 ML: 13.3 INJECTION, SUSPENSION, LIPOSOMAL INFILTRATION at 07:18

## 2022-02-03 RX ADMIN — PROCHLORPERAZINE EDISYLATE 5 MG: 5 INJECTION INTRAMUSCULAR; INTRAVENOUS at 12:23

## 2022-02-03 RX ADMIN — GLYCOPYRROLATE 0.2 MG: 0.2 INJECTION, SOLUTION INTRAMUSCULAR; INTRAVENOUS at 09:04

## 2022-02-03 RX ADMIN — LIDOCAINE HYDROCHLORIDE 100 MG: 20 INJECTION, SOLUTION INFILTRATION; PERINEURAL at 07:40

## 2022-02-03 RX ADMIN — KETOROLAC TROMETHAMINE 10 MG: 30 INJECTION, SOLUTION INTRAMUSCULAR at 11:37

## 2022-02-03 RX ADMIN — FAMOTIDINE 20 MG: 20 TABLET ORAL at 14:45

## 2022-02-03 RX ADMIN — ROCURONIUM BROMIDE 30 MG: 50 INJECTION, SOLUTION INTRAVENOUS at 09:05

## 2022-02-03 RX ADMIN — MIDAZOLAM 1 MG: 1 INJECTION INTRAMUSCULAR; INTRAVENOUS at 06:46

## 2022-02-03 RX ADMIN — SODIUM CHLORIDE, POTASSIUM CHLORIDE, SODIUM LACTATE AND CALCIUM CHLORIDE: 600; 310; 30; 20 INJECTION, SOLUTION INTRAVENOUS at 07:34

## 2022-02-03 RX ADMIN — ACETAMINOPHEN 650 MG: 325 TABLET, FILM COATED ORAL at 20:18

## 2022-02-03 ASSESSMENT — ACTIVITIES OF DAILY LIVING (ADL)
ADLS_ACUITY_SCORE: 3

## 2022-02-03 ASSESSMENT — MIFFLIN-ST. JEOR: SCORE: 1642.56

## 2022-02-03 NOTE — ANESTHESIA PROCEDURE NOTES
TAP Procedure Note    Pre-Procedure   Staff -        Anesthesiologist:  Paulo Owens MD       Resident/Fellow: Adam Thomas MD       Performed By: resident       Location: pre-op       Procedure Start/Stop Times: 2/3/2022 6:50 AM and 2/3/2022 7:00 AM       Pre-Anesthestic Checklist: patient identified, IV checked, site marked, risks and benefits discussed, informed consent, monitors and equipment checked, pre-op evaluation, at physician/surgeon's request and post-op pain management  Timeout:       Correct Patient: Yes        Correct Procedure: Yes        Correct Site: Yes        Correct Position: Yes        Correct Laterality: Yes        Site Marked: Yes  Procedure Documentation  Procedure: TAP       Diagnosis: POST OPERATIVE PAIN       Laterality: bilateral       Patient Position: supine       Patient Prep/Sterile Barriers: sterile gloves, mask       Skin prep: Chloraprep       Needle Type: short bevel       Needle Gauge: 21.        Needle Length (millimeters): 110        Ultrasound guided       1. Ultrasound was used to identify targeted nerve, plexus, vascular marker, or fascial plane and place a needle adjacent to it in real-time.       2. Ultrasound was used to visualize the spread of anesthetic in close proximity to the above referenced structure.       3. A permanent image is entered into the patient's record.    Assessment/Narrative         The placement was negative for: blood aspirated, painful injection and site bleeding       Paresthesias: No.     Bolus given via needle..        Secured via.        Insertion/Infusion Method: Single Shot       Complications: none       Injection made incrementally with aspirations every 5 mL.    Medication(s) Administered   Bupivacaine 0.25% PF (Infiltration), 20 mL  Bupivacaine liposome (Exparel) 1.3% LA inj susp (Infiltration), 20 mL   Comments:  B/L QL Block

## 2022-02-03 NOTE — PHARMACY-TRANSPLANT NOTE
D/I: 46 year old female s/p L native nephrectomy for kidney donation 2/3/22  Medications have been reviewed by the pharmacist for efficacy, appropriate dose, medication interactions and potential adverse effects.      A: Medications reviewed for this patient as above. No medication issues were noted.  P: Pharmacy will continue to monitor for any potential medication issues, and will make recommendations as appropriate. Medication therapy needs for discharge planning will continue to be addressed throughout the current admission via multidisciplinary rounds and order review.    Charli Agrawal PharmD, BCPS  Inpatient clinical pharmacist

## 2022-02-03 NOTE — ANESTHESIA CARE TRANSFER NOTE
Patient: Zora López    Procedure: Procedure(s):  Laparoscopic Hand Assisted Living Related Kidney Donor       Diagnosis: Encounter for donation of kidney [Z52.4]  Diagnosis Additional Information: No value filed.    Anesthesia Type:   General     Note:    Oropharynx: oropharynx clear of all foreign objects and spontaneously breathing  Level of Consciousness: awake  Oxygen Supplementation: nasal cannula  Level of Supplemental Oxygen (L/min / FiO2): 4  Independent Airway: airway patency satisfactory and stable  Dentition: dentition unchanged  Vital Signs Stable: post-procedure vital signs reviewed and stable  Report to RN Given: handoff report given  Patient transferred to: PACU    Handoff Report: Identifed the Patient, Identified the Reponsible Provider, Reviewed the pertinent medical history, Discussed the surgical course, Reviewed Intra-OP anesthesia mangement and issues during anesthesia, Set expectations for post-procedure period and Allowed opportunity for questions and acknowledgement of understanding      Vitals:  Vitals Value Taken Time   /76 02/03/22 1210   Temp     Pulse 78 02/03/22 1213   Resp     SpO2 98 % 02/03/22 1213   Vitals shown include unvalidated device data.    Electronically Signed By: CHIKA Lu CRNA  February 3, 2022  12:15 PM

## 2022-02-03 NOTE — PROGRESS NOTES
Transplant Surgery Post-Op Check    S: Patient seen resting comfortably in bed. She rated her pain 7/10 when she arrived on 7A but this improved after receiving pain medications. Denies nausea. No other complaints.     O: Vital signs:  Temp: 98.3  F (36.8  C) Temp src: Oral BP: 130/74 Pulse: 78       Gen: resting comfortably in bed  Skin: warm, dry   CV: Well perfused  Resp: Unlabored on RA  GI: Abdomen soft. Incisions covered with Primapores, C/D/I.   :Sutton with clear yellow UOP  Ext: no edema noted  Neuro: Drowsy, oriented    A/P: Zora López is a healthy 46 year old female who is now s/p left laparoscopic living donor nephrectomy with Dr. King.     -Continue scheduled and PRN analgesics  -PRN antiemetics  -D5LR 60 mL/hr  -Bowel regimen  -IS/CDB  -OOB this evening      Maria Luisa Lester NP    Transplant Surgery  Pager #[phone]

## 2022-02-03 NOTE — PROGRESS NOTES
Admitted/transferred from: PACU  Time of arrival on unit 1420  2 RN full  skin assessment completed by Deb MARQUEZ And Adelita CORTES   Skin assessment finding: abdominal port sites x2, x1 hand assist site - all with primapores. No other issues noted.  Interventions/actions: monitor sites and continue with routine checks.     Will continue to monitor.

## 2022-02-03 NOTE — PLAN OF CARE
"/74 (BP Location: Right arm)   Pulse 78   Temp 98.3  F (36.8  C) (Oral)   Resp 16   Ht 1.715 m (5' 7.5\")   Wt 96.2 kg (212 lb 1.3 oz)   LMP 01/17/2022   SpO2 95%   BMI 32.73 kg/m    Patient came up from PACU at 1420.  Patient VSS on capno, afebrile. Incisional pain managed with scheduled tylenol and oxy x1. Tolerating regular diet with good appetite. PIV running D5LR. Sutton catheter in place. Will continue with POC and notify MD with changes or concerns.    "

## 2022-02-03 NOTE — PHARMACY-ADMISSION MEDICATION HISTORY
Admission Medication History Completed by Pharmacy    See [facility] Admission Navigator for allergy information, preferred outpatient pharmacy, prior to admission medications and immunization status.     Medication History Sources:     Patient interview and fill history    Changes made to PTA medication list (reason):    Added: None    Deleted: None    Changed: None    Additional Information:    None    Prior to Admission medications    Not on File       Date completed: 02/03/22    Medication history completed by: Charli Agrawal RP

## 2022-02-03 NOTE — ANESTHESIA PROCEDURE NOTES
Airway       Patient location during procedure: OR       Procedure Start/Stop Times: 2/3/2022 7:47 AM  Staff -        Anesthesiologist:  Paul Bishop MD       CRNA: Saji Alfred APRN CRNA       Other Anesthesia Staff: Eugenio Tiwari       Performed By: SRNA  Consent for Airway        Urgency: elective  Indications and Patient Condition       Indications for airway management: sumit-procedural         Mask difficulty assessment: 2 - vent by mask + OA or adjuvant +/- NMBA    Final Airway Details       Final airway type: endotracheal airway       Successful airway: ETT - single  Endotracheal Airway Details        ETT size (mm): 7.0       Cuffed: yes       Successful intubation technique: direct laryngoscopy       DL Blade Type: MAC 3       Grade View of Cords: 2       Adjucts: stylet       Position: Center       Measured from: gums/teeth       Secured at (cm): 23       Bite block used: None    Post intubation assessment        Placement verified by: capnometry, equal breath sounds and chest rise        Number of attempts at approach: 1       Number of other approaches attempted: 0       Secured with: pink tape       Ease of procedure: easy       Dentition: Intact and Unchanged

## 2022-02-03 NOTE — ANESTHESIA POSTPROCEDURE EVALUATION
Patient: Zora López    Procedure: Procedure(s):  Laparoscopic Hand Assisted Living Related Kidney Donor       Diagnosis:Encounter for donation of kidney [Z52.4]  Diagnosis Additional Information: No value filed.    Anesthesia Type:  General    Note:  Disposition: Inpatient; Admission   Postop Pain Control: Uneventful            Sign Out: Well controlled pain   PONV: No   Neuro/Psych: Uneventful            Sign Out: Acceptable/Baseline neuro status   Airway/Respiratory: Uneventful            Sign Out: Acceptable/Baseline resp. status   CV/Hemodynamics: Uneventful            Sign Out: Acceptable CV status; No obvious hypovolemia; No obvious fluid overload   Other NRE: NONE   DID A NON-ROUTINE EVENT OCCUR? No           Last vitals:  Vitals Value Taken Time   /69 02/03/22 1245   Temp 36.8  C (98.3  F) 02/03/22 1210   Pulse 75 02/03/22 1255   Resp 15 02/03/22 1210   SpO2 98 % 02/03/22 1255   Vitals shown include unvalidated device data.    Electronically Signed By: Paul Church MD  February 3, 2022  12:56 PM

## 2022-02-03 NOTE — ANESTHESIA PREPROCEDURE EVALUATION
Anesthesia Pre-Procedure Evaluation    Patient: Zora López   MRN: [id number] : 1975        Preoperative Diagnosis: Encounter for donation of kidney [Z52.4]    Procedure : Procedure(s):  Laparoscopic Hand Assisted Living Related Kidney Donor          Past Medical History:   Diagnosis Date     Miscarriage     x2 in the first trimester     Plantar fasciitis       Past Surgical History:   Procedure Laterality Date     APPENDECTOMY        No Known Allergies   Social History     Tobacco Use     Smoking status: Never Smoker     Smokeless tobacco: Never Used   Substance Use Topics     Alcohol use: Yes     Alcohol/week: 3.0 - 4.0 standard drinks     Types: 3 - 4 Standard drinks or equivalent per week     Comment: 3-4 drinks a week      Wt Readings from Last 1 Encounters:   22 96.2 kg (212 lb 1.3 oz)        Anesthesia Evaluation            ROS/MED HX  ENT/Pulmonary:  - neg pulmonary ROS     Neurologic:  - neg neurologic ROS     Cardiovascular:  - neg cardiovascular ROS   (+) -----Previous cardiac testing   Echo: Date: Results:    Stress Test: Date: Results:    ECG Reviewed: Date: Results:  Nsr  Cath: Date: Results:      METS/Exercise Tolerance: >4 METS    Hematologic:  - neg hematologic  ROS     Musculoskeletal:  - neg musculoskeletal ROS     GI/Hepatic:  - neg GI/hepatic ROS     Renal/Genitourinary:  - neg Renal ROS     Endo: Comment: Body mass index is 32.73 kg/m .      (+) Obesity,     Psychiatric/Substance Use:  - neg psychiatric ROS     Infectious Disease:  - neg infectious disease ROS     Malignancy:  - neg malignancy ROS     Other:            Physical Exam    Airway        Mallampati: II   TM distance: > 3 FB   Neck ROM: full   Mouth opening: > 3 cm    Respiratory Devices and Support         Dental           Cardiovascular   cardiovascular exam normal       Rhythm and rate: regular and normal     Pulmonary   pulmonary exam normal        breath sounds clear to auscultation           OUTSIDE  LABS:  CBC:   Lab Results   Component Value Date    WBC 8.7 05/13/2021    WBC 19.1 (H) 10/31/2009    HGB 14.8 01/25/2022    HGB 14.1 05/13/2021    HCT 42.9 05/13/2021    HCT 39.9 10/31/2009     05/13/2021     10/31/2009     BMP:   Lab Results   Component Value Date     05/13/2021     10/31/2009    POTASSIUM 4.1 05/13/2021    POTASSIUM 3.5 10/31/2009    CHLORIDE 107 05/13/2021    CHLORIDE 104 10/31/2009    CO2 30 05/13/2021    CO2 26 10/31/2009    BUN 12 05/13/2021    BUN 12 10/31/2009    CR 0.82 01/25/2022    CR 0.88 05/13/2021    GLC 84 02/03/2022    GLC 96 05/13/2021     COAGS:   Lab Results   Component Value Date    PTT 32 05/13/2021    INR 1.00 05/13/2021     POC:   Lab Results   Component Value Date    HCG Negative 10/31/2009     HEPATIC:   Lab Results   Component Value Date    ALBUMIN 3.8 05/13/2021    PROTTOTAL 7.7 05/13/2021    ALT 18 05/13/2021    AST 14 05/13/2021    ALKPHOS 81 05/13/2021    BILITOTAL 0.7 05/13/2021     OTHER:   Lab Results   Component Value Date    A1C 5.2 05/13/2021    YVONNE 8.6 05/13/2021    PHOS 2.7 05/13/2021       Anesthesia Plan    ASA Status:  2   NPO Status:  NPO Appropriate    Anesthesia Type: General.     - Airway: ETT   Induction: Intravenous.   Maintenance: Balanced.   Techniques and Equipment:     - Lines/Monitors: 2nd IV     Consents    Anesthesia Plan(s) and associated risks, benefits, and realistic alternatives discussed. Questions answered and patient/representative(s) expressed understanding.    - Discussed:     - Discussed with:  Patient         Postoperative Care    Pain management: IV analgesics, Oral pain medications, Peripheral nerve block (Single Shot), Multi-modal analgesia.   PONV prophylaxis: Ondansetron (or other 5HT-3), Dexamethasone or Solumedrol     Comments:                Paul Church MD

## 2022-02-03 NOTE — OP NOTE
Transplant Surgery  Operative Note     Procedure Date:  02/03/22    Preoperative Diagnosis:  Healthy kidney donor    Postoperative Diagnosis:  Healthy kidney donor    Procedure:  1. Left Kidney Laparoscopic Hand-Assisted living donor nephrectomy for donation      Secondary Procedure:    None     Surgeon:    Surgeon(s) and Role:     * Florence King MD - Primary       Fellow/Assistant:   Dorian Puente MD.  There was no qualified assistant to assist with this procedure.       Specimen:  Left kidney and ureter    Anesthesia:    General    Urine Output: 350 mls  Estimated Blood Loss: 50 mls   Fluids administered:      Intra Op Events: None      Complications: None.    Findings: left kidney with single artery, single vein, and single ureter       Donor UNOS ID:    [id number]  Flush Start time:  2/3/2022 10:51 AM    Arterial Clamp:    2/3/2022 10:47 AM  Arterial anatomy:  Single    Venous anatomy:    Single  Ureteral anatomy:   Single     Indication: Zora López presents for Left Kidney donation. The patient has undergone a thorough medical and psychosocial evaluation and was found suitable for voluntary kidney donation. Risks and benefits of donation were discussed. The patient expressed understanding, was willing to proceed, and provided informed consent.    Final ABO/Crossmatch verification: Prior to incision, I verified the donor ABO and recipient ABO. I visually verified that the donor identification, blood type, and other vital data were compatible with the recipient.      Operative Procedure:  Zora Lópze was transported to the operating room, placed on the operating table in the right lateral decubitus position, and a universal timeout was performed. Sequential compression devices were placed on both lower extremities and general endotracheal anesthesia was induced. The patient was given IV antibiotics and Sutton catheter.  The abdomen was shaved, prepped, and draped in the usual sterile  fashion.    We made a 6 cm upper midline incision and carried down thru the linea alba. The peritoneum was opened under direct vision. The hand port was put into position and a left lower quadrant 10 mm port was placed over a trocar with hand assistance. Pneumoperitoneum with CO2 was provided to 12 mmHg. General survey with the laparoscope revealed no unusual findings. An additional 10 mm port was placed in the left lateral abdomen under direct vision.     We released the left colon from its lateral attachments and rotated medially, revealing the kidney and ureter. The ureter was circumferentially dissected free distally toward the pelvis then kidney taking care to preserve its vasculature. The gonadal vein was identified and dissected enbloc with the ureter, and the proximal gonadal vein was doubly ligated and divided near the insertion into the left renal vein. The left adrenal vein was likewise identified, ligated and divided. The renal vein was then circumferentially cleared of extraneous tissue. The kidney and ureter were dissected free posteriorly from the psoas and multiple lumbar veins were clipped and divided.     We created a plane between the left adrenal gland and the upper pole of the kidney with the harmonic scalpel and the upper pole attachments were released. The anterior and inferior aspects of the renal artery at its origin were cleared of investing lymphatics. The patient was given fluid, mannitol and lasix, and the kidney was then dissected free from its lateral attachments allowing full medial rotation. The remaining lymphatics and fat around the renal artery was cleared. The patient was heparinized and the distal ureter and gonadal vein were clipped and divided. Good urine flow was seen. A laparoscopic JESSA stapler was fired across the renal artery and then the renal vein.     The kidney was delivered from the hand port, flushed, and taken to recipient room. Protamine was administered for full  heparin reversal. Pneumoperitoneum was reestablished and hemostasis was obtained. Vascular transection sites were visualized and confirmed secure. The colon was placed back in its natural position. The 10 mm port sites were closed with 0-vicryl. The hand port was closed with 0-PDS. Skin incisions were irrigated and closed with 4-0 monocryl and steri-strips. Needle, sponge, and instrument counts were correct x2.  Faculty was present for key portions of the procedure. The patient tolerated the procedure well without apparent complications and was extubated and transferred to PACU in good condition.     Physician Attestation   I was present for the key portions of the procedure and I was immediately available for the entire procedure between opening and closing.    Florence King MD  Date of Service (when I saw the patient): 2/3/2022  The transplant fellow, Dae Puente MD, was present and assisted with all aspects of the operation described above from opening to closing.  There was no suitable surgical resident available to assist in this procedure.

## 2022-02-04 ENCOUNTER — DOCUMENTATION ONLY (OUTPATIENT)
Dept: TRANSPLANT | Facility: CLINIC | Age: 47
End: 2022-02-04
Payer: COMMERCIAL

## 2022-02-04 PROBLEM — G89.18 ACUTE POST-OPERATIVE PAIN: Status: ACTIVE | Noted: 2022-02-04

## 2022-02-04 LAB
ALBUMIN UR-MCNC: 10 MG/DL
APPEARANCE UR: CLEAR
BILIRUB UR QL STRIP: NEGATIVE
BUN SERPL-MCNC: 18 MG/DL (ref 7–30)
CK SERPL-CCNC: 468 U/L (ref 30–225)
COLOR UR AUTO: ABNORMAL
CREAT SERPL-MCNC: 1.27 MG/DL (ref 0.52–1.04)
GFR SERPL CREATININE-BSD FRML MDRD: 53 ML/MIN/1.73M2
GLUCOSE UR STRIP-MCNC: NEGATIVE MG/DL
HCT VFR BLD AUTO: 39.1 % (ref 35–47)
HGB BLD-MCNC: 12.7 G/DL (ref 11.7–15.7)
HGB UR QL STRIP: NEGATIVE
HOLD SPECIMEN: NORMAL
KETONES UR STRIP-MCNC: NEGATIVE MG/DL
LEUKOCYTE ESTERASE UR QL STRIP: NEGATIVE
MUCOUS THREADS #/AREA URNS LPF: PRESENT /LPF
NITRATE UR QL: NEGATIVE
PH UR STRIP: 5.5 [PH] (ref 5–7)
RBC URINE: 0 /HPF
SP GR UR STRIP: 1.02 (ref 1–1.03)
UROBILINOGEN UR STRIP-MCNC: NORMAL MG/DL
WBC URINE: 3 /HPF

## 2022-02-04 PROCEDURE — 120N000011 HC R&B TRANSPLANT UMMC

## 2022-02-04 PROCEDURE — 81001 URINALYSIS AUTO W/SCOPE: CPT | Performed by: STUDENT IN AN ORGANIZED HEALTH CARE EDUCATION/TRAINING PROGRAM

## 2022-02-04 PROCEDURE — 82550 ASSAY OF CK (CPK): CPT | Performed by: STUDENT IN AN ORGANIZED HEALTH CARE EDUCATION/TRAINING PROGRAM

## 2022-02-04 PROCEDURE — 85018 HEMOGLOBIN: CPT | Performed by: STUDENT IN AN ORGANIZED HEALTH CARE EDUCATION/TRAINING PROGRAM

## 2022-02-04 PROCEDURE — 84520 ASSAY OF UREA NITROGEN: CPT | Performed by: STUDENT IN AN ORGANIZED HEALTH CARE EDUCATION/TRAINING PROGRAM

## 2022-02-04 PROCEDURE — 250N000013 HC RX MED GY IP 250 OP 250 PS 637: Performed by: STUDENT IN AN ORGANIZED HEALTH CARE EDUCATION/TRAINING PROGRAM

## 2022-02-04 PROCEDURE — 36415 COLL VENOUS BLD VENIPUNCTURE: CPT | Performed by: STUDENT IN AN ORGANIZED HEALTH CARE EDUCATION/TRAINING PROGRAM

## 2022-02-04 PROCEDURE — 82565 ASSAY OF CREATININE: CPT | Performed by: STUDENT IN AN ORGANIZED HEALTH CARE EDUCATION/TRAINING PROGRAM

## 2022-02-04 PROCEDURE — 250N000011 HC RX IP 250 OP 636: Performed by: NURSE PRACTITIONER

## 2022-02-04 PROCEDURE — 250N000011 HC RX IP 250 OP 636: Performed by: STUDENT IN AN ORGANIZED HEALTH CARE EDUCATION/TRAINING PROGRAM

## 2022-02-04 RX ORDER — HEPARIN SODIUM 5000 [USP'U]/.5ML
5000 INJECTION, SOLUTION INTRAVENOUS; SUBCUTANEOUS EVERY 12 HOURS
Status: DISCONTINUED | OUTPATIENT
Start: 2022-02-04 | End: 2022-02-06 | Stop reason: HOSPADM

## 2022-02-04 RX ORDER — ONDANSETRON 4 MG/1
4 TABLET, ORALLY DISINTEGRATING ORAL EVERY 6 HOURS PRN
Qty: 10 TABLET | Refills: 0 | Status: SHIPPED | OUTPATIENT
Start: 2022-02-04 | End: 2022-02-15

## 2022-02-04 RX ORDER — AMOXICILLIN 250 MG
1-2 CAPSULE ORAL 2 TIMES DAILY PRN
Qty: 60 TABLET | Refills: 0 | Status: SHIPPED | OUTPATIENT
Start: 2022-02-04 | End: 2023-10-16

## 2022-02-04 RX ORDER — ACETAMINOPHEN 325 MG/1
650 TABLET ORAL EVERY 6 HOURS
Qty: 60 TABLET | Refills: 0 | Status: SHIPPED | OUTPATIENT
Start: 2022-02-04 | End: 2023-10-16

## 2022-02-04 RX ORDER — OXYCODONE HYDROCHLORIDE 5 MG/1
5-10 TABLET ORAL EVERY 4 HOURS PRN
Qty: 15 TABLET | Refills: 0 | Status: SHIPPED | OUTPATIENT
Start: 2022-02-04 | End: 2023-10-16

## 2022-02-04 RX ADMIN — OXYCODONE HYDROCHLORIDE 10 MG: 5 TABLET ORAL at 22:20

## 2022-02-04 RX ADMIN — KETOROLAC TROMETHAMINE 10 MG: 15 INJECTION, SOLUTION INTRAMUSCULAR; INTRAVENOUS at 02:06

## 2022-02-04 RX ADMIN — SENNOSIDES AND DOCUSATE SODIUM 2 TABLET: 50; 8.6 TABLET ORAL at 07:45

## 2022-02-04 RX ADMIN — FAMOTIDINE 20 MG: 20 TABLET ORAL at 07:45

## 2022-02-04 RX ADMIN — OXYCODONE HYDROCHLORIDE 5 MG: 5 TABLET ORAL at 15:40

## 2022-02-04 RX ADMIN — ACETAMINOPHEN 650 MG: 325 TABLET, FILM COATED ORAL at 22:20

## 2022-02-04 RX ADMIN — OXYCODONE HYDROCHLORIDE 5 MG: 5 TABLET ORAL at 19:20

## 2022-02-04 RX ADMIN — ACETAMINOPHEN 650 MG: 325 TABLET, FILM COATED ORAL at 07:45

## 2022-02-04 RX ADMIN — ONDANSETRON 4 MG: 4 TABLET, ORALLY DISINTEGRATING ORAL at 15:40

## 2022-02-04 RX ADMIN — ACETAMINOPHEN 650 MG: 325 TABLET, FILM COATED ORAL at 14:37

## 2022-02-04 RX ADMIN — ACETAMINOPHEN 650 MG: 325 TABLET, FILM COATED ORAL at 02:06

## 2022-02-04 RX ADMIN — KETOROLAC TROMETHAMINE 10 MG: 15 INJECTION, SOLUTION INTRAMUSCULAR; INTRAVENOUS at 10:30

## 2022-02-04 RX ADMIN — SENNOSIDES AND DOCUSATE SODIUM 2 TABLET: 50; 8.6 TABLET ORAL at 22:19

## 2022-02-04 RX ADMIN — KETOROLAC TROMETHAMINE 10 MG: 15 INJECTION, SOLUTION INTRAMUSCULAR; INTRAVENOUS at 18:34

## 2022-02-04 RX ADMIN — Medication 10 MG: at 07:45

## 2022-02-04 RX ADMIN — HEPARIN SODIUM 5000 UNITS: 5000 INJECTION, SOLUTION INTRAVENOUS; SUBCUTANEOUS at 18:28

## 2022-02-04 ASSESSMENT — ACTIVITIES OF DAILY LIVING (ADL)
ADLS_ACUITY_SCORE: 5
ADLS_ACUITY_SCORE: 7
ADLS_ACUITY_SCORE: 7
ADLS_ACUITY_SCORE: 5
ADLS_ACUITY_SCORE: 3
ADLS_ACUITY_SCORE: 7
ADLS_ACUITY_SCORE: 5
ADLS_ACUITY_SCORE: 7
ADLS_ACUITY_SCORE: 5
ADLS_ACUITY_SCORE: 7
ADLS_ACUITY_SCORE: 5
ADLS_ACUITY_SCORE: 7
ADLS_ACUITY_SCORE: 7
ADLS_ACUITY_SCORE: 5
ADLS_ACUITY_SCORE: 5
ADLS_ACUITY_SCORE: 7
ADLS_ACUITY_SCORE: 7
ADLS_ACUITY_SCORE: 5
ADLS_ACUITY_SCORE: 7
ADLS_ACUITY_SCORE: 5
ADLS_ACUITY_SCORE: 7
ADLS_ACUITY_SCORE: 3

## 2022-02-04 ASSESSMENT — MIFFLIN-ST. JEOR: SCORE: 1710.56

## 2022-02-04 NOTE — PROVIDER NOTIFICATION
[id number] DYAN López pt's temp 99.5, . BP looks good 129/63. This is a change from her baseline.   Thank you! Deb [id number]

## 2022-02-04 NOTE — PROGRESS NOTES
Transplant Surgery  Inpatient Daily Progress Note  02/04/2022    Ms. López is Post-operative day #1 s/p laparoscopic left donor nephrectomy. Issues Overnight: None     Patient Vitals for the past 24 hrs:   BP Temp Temp src Pulse Resp SpO2 Weight   02/04/22 1033 121/65 98.2  F (36.8  C) Oral 83 18 99 % --   02/04/22 0536 123/76 98.6  F (37  C) Oral 92 16 93 % 103 kg (227 lb 1.2 oz)   02/04/22 0134 121/68 99.1  F (37.3  C) Oral 81 16 95 % --   02/04/22 0020 121/72 99.2  F (37.3  C) Oral 80 16 96 % --   02/03/22 2219 129/63 99.5  F (37.5  C) Oral 103 16 93 % --   02/03/22 1810 124/75 98.6  F (37  C) Oral 65 16 93 % --   02/03/22 1413 130/74 98.3  F (36.8  C) Oral 78 16 95 % --   02/03/22 1345 123/75 -- -- 70 16 95 % --   02/03/22 1330 125/73 -- -- 71 12 -- --   02/03/22 1315 116/66 -- -- 72 12 97 % --   02/03/22 1300 116/73 98.4  F (36.9  C) Oral 78 12 99 % --   02/03/22 1245 116/69 -- -- 74 13 97 % --   02/03/22 1234 -- -- -- -- 18 -- --   02/03/22 1230 111/70 -- -- 74 15 97 % --   02/03/22 1215 113/73 -- -- 86 13 98 % --   02/03/22 1210 105/76 98.3  F (36.8  C) Oral 85 15 98 % --       Pain: Biggest complaint is headache.  Nausea:    [x]    None      []    Some, but not needing medication    []    Yes, needing medication      []    Dry Retching    []    Vomited    DREAMS Performance:    DRinking: Yes, had faustino and two cups water this morning   Eating: Yes tolerating regular diet well   Analgesia: IV Toradol, Tylenol, oxycodone   Mobilizing:  Up in room with assist   Slept: Yes    Passed flatus? Yes   Incision(s): Incision and lap sites covered, C/D/I with no sumit-incisional ecchymoses  Abdomen: mild distention, appropriately tender, soft  Extremities: no cyanosis or edema     Allergies:   No Known Allergies    Medications:  Hospital Medications as of 2/4/2022       Dose Frequency Start End    acetaminophen (TYLENOL) tablet 650 mg 650 mg EVERY 6 HOURS 2/3/2022 2/5/2022    Admin Instructions: Administer for  "multimodal surgical pain management.  Pharmacy to time the next dose 8 hours from PRE OP dose.  Maximum acetaminophen dose from all sources = 75 mg/kg/day not to exceed 4 grams/day.    Class: E-Prescribe    Route: Oral    aspirin-acetaminophen-caffeine (EXCEDRIN MIGRAINE) per tablet 1 tablet 1 tablet EVERY 6 HOURS PRN 2/4/2022     Admin Instructions: Each tablet contains 250 mg of acetaminophen    Class: E-Prescribe    Route: Oral    bisacodyl (DULCOLAX) Suppository 10 mg 10 mg DAILY 2/4/2022     Admin Instructions: Start POD 1, then daily until BM. Hold for loose stools.  Hold for loose stools.    Class: E-Prescribe    Route: Rectal    bupivacaine liposome (EXPAREL) LA inj was given in the infiltration site to produce post-op analgesia. Duration of action is up to 72 hours. Other \"shon\" meds should not be given for 96 hours except for lidocaine 4% patch. This is for INFORMATION ONLY.  CONTINUOUS PRN 2/3/2022 2/7/2022    Admin Instructions: NURSE to notify physician if patient has ringing in the ears, metallic taste in the mouth, or circumoral numbness. DO NOT administer any additional local anesthetics POST-OPERATIVELY without contacting Anesthesia first.    Class: E-Prescribe    Route: Does not apply    famotidine (PEPCID) tablet 20 mg 20 mg DAILY 2/3/2022     Class: E-Prescribe    Route: Oral    fentaNYL (PF) (SUBLIMAZE) injection 10-20 mcg 10-20 mcg EVERY 1 HOUR PRN 2/3/2022     Admin Instructions: For Severe breakthrough pain  Start at the lowest dose. May adjust dose by 5 mcg every 1 hour as needed. Maximum individual dose is 20 mcg.    Route: Intravenous    heparin (porcine) injection 5,000 Units (Completed) 5,000 Units ONCE 2/3/2022 2/3/2022    Admin Instructions: At direction of Surgeon, administer heparin intra-operatively 3 minutes before renal artery is clamped.    Class: E-Prescribe    Route: Intravenous    ketorolac (TORADOL) injection 10 mg 10 mg EVERY 8 HOURS 2/3/2022 2/5/2022    Admin Instructions: " "Pharmacy to time the first dose based on the timing of the INTRA-OP dose.  Can cause pain on injection.  If ordered intravenously (IV) : administer through a running maintenance fluid over 1 minute followed by a flush.  If patient complains of pain on injection, may dilute 15-30 mg in 5 mL and push over 1 to 2 minutes.      Class: E-Prescribe    Route: Intravenous    naloxone (NARCAN) injection 0.2 mg 0.2 mg EVERY 2 MIN PRN 2/3/2022     Admin Instructions: Administer intravenous route when available and notify provider when administered.  For unintended sedation or respiratory depression if all of the below criteria are met:  ~ respiratory rate LESS than or EQUAL to 8.   ~SaO2 less than 92% and or/end-tidal CO2 is greater than 50.  ~ the patient is receiving an opioid, has unintended sedations assessed as RASS (-3), and is currently not on mechanical ventilation.  RASS scale moderate (-3) is movement or eye opening to voice but no eye contact.    Patient Monitoring  Once the patient has demonstrated a response to the naloxone, continue to monitor respiratory rate, depth, oxygen saturation and end-tidal CO2 (if available) every 15 minutes x 2, then every 30 minutes x 2, then every 1 hour x 1 after each naloxone dose.  Consider transfer to ICU if patient respiratory parameters have not improved after 4 naloxone doses.    Class: E-Prescribe    Route: Intravenous    Linked Group 1: \"Or\" Linked Group Details        naloxone (NARCAN) injection 0.2 mg 0.2 mg EVERY 2 MIN PRN 2/3/2022     Admin Instructions: Administer intramuscular if an intravenous route is not available and notify provider when administered.  For unintended sedation or respiratory depression if all of the below criteria are met:  ~ respiratory rate LESS than or EQUAL to 8.   ~SaO2 less than 92% and or/end-tidal CO2 is greater than 50.  ~ the patient is receiving an opioid, has unintended sedations assessed as RASS (-3), and is currently not on mechanical " "ventilation.  RASS scale moderate (-3) is movement or eye opening to voice but no eye contact.    Patient Monitoring  Once the patient has demonstrated a response to the naloxone, continue to monitor respiratory rate, depth, oxygen saturation and end-tidal CO2 (if available) every 15 minutes x 2, then every 30 minutes x 2, then every 1 hour x 1 after each naloxone dose.  Consider transfer to ICU if patient respiratory parameters have not improved after 4 naloxone doses.    Class: E-Prescribe    Route: Intramuscular    Linked Group 1: \"Or\" Linked Group Details        naloxone (NARCAN) injection 0.4 mg 0.4 mg EVERY 2 MIN PRN 2/3/2022     Admin Instructions: Administer intravenous route when available and notify provider when administered.  For unintended sedation or respiratory depression if all of the below criteria are met:  ~ respiratory rate LESS than or EQUAL to 8.  ~ SaO2 less than 92% and or/end-tidal CO2 is greater than 50.  ~ the patient is receiving an opioid, has unintended sedation assessed as RASS (-4) or (-5) and patient is currently not on mechanical ventilation.  RASS scale (-4) is deep sedation with no response to voice but movement or eye opening to physical stimulation.  RASS scale (-5) is unarousable.      Patient Monitoring  Once the patient has demonstrated a response to the naloxone, continue to monitor respiratory rate, depth, oxygen saturation and end-tidal CO2 (if available) every 15 minutes x 2, then every 30 minutes x 2, then every 1 hour x 1 after each naloxone dose.  Consider transfer to ICU if patient respiratory parameters have not improved after 4 naloxone doses.    Class: E-Prescribe    Route: Intravenous    Linked Group 1: \"Or\" Linked Group Details        naloxone (NARCAN) injection 0.4 mg 0.4 mg EVERY 2 MIN PRN 2/3/2022     Admin Instructions: Administer intramuscular if an intravenous route is not available and notify provider when administered.  For unintended sedation or " "respiratory depression if all of the below criteria are met:  ~ respiratory rate LESS than or EQUAL to 8.  ~ SaO2 less than 92% and or/end-tidal CO2 is greater than 50.  ~ the patient is receiving an opioid, has unintended sedation assessed as RASS (-4) or (-5) and patient is currently not on mechanical ventilation.  RASS scale (-4) is deep sedation with no response to voice but movement or eye opening to physical stimulation.  RASS scale (-5) is unarousable.      Patient Monitoring  Once the patient has demonstrated a response to the naloxone, continue to monitor respiratory rate, depth, oxygen saturation and end-tidal CO2 (if available) every 15 minutes x 2, then every 30 minutes x 2, then every 1 hour x 1 after each naloxone dose.  Consider transfer to ICU if patient respiratory parameters have not improved after 4 naloxone doses.    Class: E-Prescribe    Route: Intramuscular    Linked Group 1: \"Or\" Linked Group Details        ondansetron (ZOFRAN) injection 4 mg 4 mg EVERY 6 HOURS PRN 2/3/2022     Admin Instructions: This is Step 1 of nausea and vomiting management.  If nausea not resolved in 15 minutes, go to Step 2 prochlorperazine (COMPAZINE).  Irritant.    Class: E-Prescribe    Route: Intravenous    Linked Group 2: \"Or\" Linked Group Details        ondansetron (ZOFRAN-ODT) ODT tab 4 mg 4 mg EVERY 6 HOURS PRN 2/3/2022     Admin Instructions: This is Step 1 of nausea and vomiting management.  If nausea not resolved in 15 minutes, go to Step 2 prochlorperazine (COMPAZINE). Do not push through foil backing. Peel back foil and gently remove. Place on tongue immediately. Administration with liquid unnecessary  With dry hands, peel back foil backing and gently remove tablet. Do not push oral disintegrating tablet through foil backing. Administer immediately on tongue and oral disintegrating tablet dissolves in seconds, then swallow with saliva. Liquid not required.    Class: E-Prescribe    Route: Oral    Linked Group " "2: \"Or\" Linked Group Details        oxyCODONE (ROXICODONE) tablet 5-10 mg 5-10 mg EVERY 3 HOURS PRN 2/3/2022     Admin Instructions: ~ Start at the lowest dose.  ~ May adjust dose by 5 mg every 3 hours to optimize patient mobilization and comfort as needed.  Maximum individual dose is 10 mg.  ~ Hold dose for analgesic side effects.    ~ Notify provider to assess patient for uncontrolled pain or analgesic side effects.   ~ Maximum total is 80 mg in 24 hours.    Route: Oral    prochlorperazine (COMPAZINE) injection 10 mg 10 mg EVERY 6 HOURS PRN 2/3/2022     Admin Instructions: This is Step 2 of nausea and vomiting management.   If nausea not resolved in 15-30 minutes, Notify provider.    Class: E-Prescribe    Route: Intravenous    Linked Group 3: \"Or\" Linked Group Details        prochlorperazine (COMPAZINE) tablet 10 mg 10 mg EVERY 6 HOURS PRN 2/3/2022     Admin Instructions: This is Step 2 of nausea and vomiting management.   If nausea not resolved in 15-30 minutes, Notify provider.    Class: E-Prescribe    Route: Oral    Linked Group 3: \"Or\" Linked Group Details        protamine injection 3 mg (Completed) 3 mg ONCE 2/3/2022 2/3/2022    Admin Instructions: At direction of Surgeon, administer protamine intra-operatively.  Give slowly once renal artery is divided.   1 ml protamine (10 mg/mL) for every 1 ml heparin (1000 units/mL)    Class: E-Prescribe    Route: Intravenous    senna-docusate (SENOKOT-S/PERICOLACE) 8.6-50 MG per tablet 1 tablet 1 tablet 2 TIMES DAILY 2/3/2022     Admin Instructions: If no bowel movement in 24 hours, increase to 2 tablets PO.  Hold for loose stools.  Hold for loose stools.    Class: E-Prescribe    Route: Oral    Linked Group 4: \"Or\" Linked Group Details        senna-docusate (SENOKOT-S/PERICOLACE) 8.6-50 MG per tablet 2 tablet 2 tablet 2 TIMES DAILY 2/3/2022     Admin Instructions: Hold for loose stools.  Hold for loose stools.    Class: E-Prescribe    Route: Oral    Linked Group 4: \"Or\" " Linked Group Details              Labs:  Cr 1.3 (0.9)  Hgb 12.7 (13.2)    Assessment: Post-operative day #1, doing well. Did complain of left inner ear pain overnight which has resolved. Continues to c/o headache which she associates with lack of caffeine. Tolerating regular diet. Drinking good amounts of water. Sutton removed this morning. BMx1 and passing gas.    Plan:   -Encouraged ambulation and ISB   -Continue GI and DVT prophylaxis  -Pain control: Scheduled acetaminophen, Toradol. Encourage oral analgesia prn.  -Remove Sutton  -Discontinue IV fluid    Discharge planning: Today vs. Tomorrow. Will discuss with patient this afternoon.    Maria Luisa Lester NP  Division of Transplantation  Pager [phone]

## 2022-02-04 NOTE — PLAN OF CARE
[id number]. Tmax 99.5, HR as high as 103. OVSS on RA, patient on capno. Patient states pain is well controlled with scheduled pain meds, PRN fentanyl administered once. Denies nausea. Regular diet with adequate appetite. PIV x2 SL, infusing LR at 60 mL/hr. Abd puncture sites x3 covered with primapores, CDI. Patient did not ambulate this shift. Sutton to be removed at 0600. Continue with plan of care and update team with any changes.

## 2022-02-04 NOTE — PLAN OF CARE
"/76 (BP Location: Right arm)   Pulse 92   Temp 98.6  F (37  C) (Oral)   Resp 16   Ht 1.715 m (5' 7.5\")   Wt 96.2 kg (212 lb 1.3 oz)   LMP 01/17/2022   SpO2 93%   BMI 32.73 kg/m    [id number]  Neuro: Pt. alert & Ox4  Behavior: Pt. calm & Ox4  Activity: Pt. up with 1 assist.  Vital: AVSS on RA, Capno. scores WNL  LDAs: D5LR at 60cc/hour into right PIV. Left PIV saline locked.  Cardiac: WNL  Respiratory: LS clear  GI/: Sutton with good output. No stools this shift. Sutton pulled at 6am per order.  Skin: Primapores over incision & lap. sites & dressings c/d/I.  Pain: Abdominal pain & headache moderately controlled with sched. Tylenol & Toradol. Pt. c/o left ear pain this morning, pt. states pain is dull & sometimes sharp. Writer assessed inner ear & no redness or drainage; JESÚS Caamrillo aware.  Diet: Regular  Labs/Imaging: Hgb 12.7, Cr 1.27 at 0010, surgery cross-cover aware.  Plan: Continue to follow POC & notify MD with change in status.      "

## 2022-02-04 NOTE — PROGRESS NOTES
Briefly visited with Zora and her  s/p living donor nephrectomy POD 0.  Zora appeared comfortable when I visited.  She was very sleepy but aroused easily to voice.  Her vitals were WNL.  Sutton intact.  IVF infusing.  Patient denied any complaints.  Encouraged Zora to try some PO intake tonight and take a walk with assistance the nursing staff or sit or stand at the side of the bed with assist.  Expressed our gratitude from the transplant team for her generous donation and wished her well on her recovery.    Desi Hopkins RN, BSN, CCTN  Living Donor Coordinator  [phone]

## 2022-02-04 NOTE — PROGRESS NOTES
INDEPENDENT LIVING DONOR ADVOCATE NOTE:  D:  Kim López is a living kindey donor, POD 1.  I:  I met with her at bedside to thank her for kidney donation and to assess for any ASYA needs.  I assessed the patient's mood/affect, plans for recovery, and any feelings of regret/remorse regarding donation.  We reviewed the importance of completing follow-up labs and surveys at six months, 1 year and 2 years after donation to monitor kidney health and the impact donation has had on their life post donation.   A:  She is resting comfortably/sitting up/walking the unit.  She appears to be in a good mood and affect is congruent.  She states that pain is well controlled.  She reports that her father/ recipient is doing well.  Patient describes donation recovery as going well .  She and I discussed how to reach me should she have any post operative ASYA needs.  She reports no feelings of regret or remorse about donation.  She denies any discharge planning needs at this time.  Patient plans to discharge to home with the care and support of her / family.   P: ASYA will remain available to assist with any support and ASYA needs, both inpatient and outpatient, as needed.  Patient is aware of follow-up recommendations and has my contact information.

## 2022-02-04 NOTE — PHARMACY-TRANSPLANT NOTE
[facility] Organ Transplant Donor Prior to Discharge Note  46 year old female s/p kidney donation surgery on 2/3/2022.     Pharmacy has monitored for any potential medication issues.  In anticipation for discharge, medication therapy needs have been addressed daily throughout the current admission via multidisciplinary rounds and/or discussions, order verification, daily clinical pharmacy review, and communication with prescribers.    Desi Block, PharmD, [facility]   PGY-1 Pharmacy Resident

## 2022-02-04 NOTE — PROVIDER NOTIFICATION
"Surgery cross-cover paged:   \" FYI: DYAN López on 7A, Donor  FYI: Pt's Hgb 12.7 from 13.2, Cr 1.27 from 0.82 at 0010. Vitals stable.\"  Thanks,  Zeina [phone]  "

## 2022-02-04 NOTE — PROGRESS NOTES
POD 1 post kidney donation. Inpatient team continues to follow closely. Discharge anticipated today or tomorrow.     What to expect after donation reviewed during pre op. Zora knows how to call a coordinator if needed.     Organ specific education completed, and discharge planning has been conducted with multidisciplinary transplant care team including physicians, pharmacy, nutrition, and social work.     Will continue to follow progress and assist with coordination of care. Check in call planned for Monday.

## 2022-02-04 NOTE — DISCHARGE SUMMARY
[facility] [place]    Discharge Summary  Solid Organ Transplant Surgery    Date of Admission:  2/3/2022  Date of Discharge:  2/6/2022    Discharge Diagnoses   Active Problems:    Encounter for donation of kidney    Acute post-operative pain    Procedure/Surgery Information     Procedure Date:  02/03/22    Preoperative Diagnosis:  Healthy kidney donor    Postoperative Diagnosis:  Healthy kidney donor    Procedure:  1. Left Kidney Laparoscopic Hand-Assisted living donor nephrectomy for donation       Secondary Procedure:     None     Surgeon:     Surgeon(s) and Role:     * Florence King MD - Primary       Fellow/Assistant:     Dorian Puente MD.  There was no qualified assistant to assist with this procedure.    Specimen:  Left kidney and ureter    Anesthesia:     General     History of Present Illness   Zora López is a healthy 46 year old female who presented for nephrectomy for kidney donation.    Hospital Course   Zora López was admitted on 2/3/2022 and underwent nephrectomy for kidney donation.     S/p Laparoscopic left donor nephrectomy: Tolerating oral diet, ambulating, passing gas and having BMs, and pain controlled by day of discharge. Small prescription of Zofran provided on discharge for history of PONV.      Acute post op pain: Controlled with Tylenol, PRN oxycodone. Continue bowel regimen with opioids.     Patient discharged on POD3, delayed discharge due to pain control and nausea.     Discharge Disposition   Discharged to home   Condition at discharge: Stable    Primary Care Physician   [facility]    Physical Exam                      Vitals:    02/04/22 0536 02/05/22 0241 02/06/22 0538   Weight: 103 kg (227 lb 1.2 oz) 98.7 kg (217 lb 11.2 oz) 96.8 kg (213 lb 6.5 oz)     Vital Signs with Ranges     I/O last 3 completed shifts:  In: 1740 [P.O.:1740]  Out: 5200 [Urine:5200]    Constitutional: NAD  Respiratory:  "unlabored on RA  Cardiovascular: perfused  GI: abdomen soft, appropriately tender. Incisions covered, C/D/I.   Genitourinary: no robles  Musculoskeletal: no edema noted  Neurologic: A&Ox4  Neuropsychiatric: behavior appropriate to situation    Consultations This Hospital Stay   PHARMACY IP CONSULT  SOT MEDICATION HISTORY IP PHARMACY CONSULT    Time Spent on this Encounter   I have spent greater than 30 minutes on this discharge.    Discharge Orders      Reason for your hospital stay    You were admitted for kidney donation. You are discharging home in stable condition with close follow up.     Activity    Your activity upon discharge: Don't lift anything heavier than 10 pounds for 8 weeks (or longer if your surgeon has discussed this with you).  Walk regularly.    OK to drive only after no longer taking narcotics AND you feel comfortable working the breaks/clutch suddenly if needed.  Limit sports and strenuous activities/'core' exercises for 8 weeks.  If you experience pain after exertion, try an ice pack or warm pack to the area.   Wear abdominal binder for comfort if you desire.    You have been instructed to avoid NSAIDs (medications such as ibuprofen, \"Motrin\", naproxen, diclofenac) as these medications may affect the remaining kidney. Take other medications as prescribed.    Keep narcotics out of reach of children. When finished with them, please destroy/discard any remaining pills responsibly. Often, your county government office, local police station or pharmacy will have a drug deposit box.     Adult [facility] Follow-up and recommended labs and tests    Follow up with Dr. King in Transplant Clinic in 2-3 weeks.  If you need to change your appointment please contact your coordinator at [phone].     When to contact your care team    Your transplant coordinator if you have any of the following:   Swelling, oozing, worsening pain, unusual redness around the incision  Fever of 101 F or higher "   Increasing abdominal pain   Nausea or vomiting   Severe diarrhea, bloating, or constipation     Any concerns or questions, please call your Transplant coordinator:    Phone: [phone].  If they are not available, the on call coordinator/MD will help you with your concern.  If you are unable to reach a coordinator and have an urgent medical questions, please call the hospital at [phone] and ask to have the Pancreas Transplant Surgery fellow on-call paged.     Wound care and dressings    Instructions to care for your wound at home: If your incisions have GLUE, gently wash with soap and water, but do not scrub. Do not soak in a bath until the glue has naturally fallen off and any openings are closed (at least 2 weeks).    If your incisions have STERI STRIPS, leave steri strips in place until they curl and peel off. Please don't shower until 48 hours after surgery. Then gently wash with soap and water, but do not scrub them. Do not soak in a bath until the strips have naturally fallen off and any openings are closed (at least 2 weeks).     Diet    Follow this diet upon discharge: Keep yourself well hydrated (goal 1500 ml/day).   Eat lightly the first week as tolerated and avoid constipating foods.  If you are constipated, take a stool softener like Senna, Dulcolax, Miralax, or a Fleets enema (available over the counter).     Discharge Medications   Discharge Medication List as of 2/6/2022  9:31 AM      START taking these medications    Details   acetaminophen (TYLENOL) 325 MG tablet Take 2 tablets (650 mg) by mouth every 6 hours, Disp-60 tablet, R-0, E-Prescribe      ondansetron (ZOFRAN-ODT) 4 MG ODT tab Take 1 tablet (4 mg) by mouth every 6 hours as needed for nausea or vomiting, Disp-10 tablet, R-0, E-Prescribe      oxyCODONE (ROXICODONE) 5 MG tablet Take 1-2 tablets (5-10 mg) by mouth every 4 hours as needed for moderate to severe pain, Disp-15 tablet, R-0, E-Prescribe      senna-docusate  (SENOKOT-S/PERICOLACE) 8.6-50 MG tablet Take 1-2 tablets by mouth 2 times daily as needed for constipation, Disp-60 tablet, R-0, E-Prescribe           Allergies   No Known Allergies  Data   Most Recent 3 CBC's:  Recent Labs   Lab Test 02/04/22  0010 02/03/22  1637 01/25/22  1001 05/13/21  0636   WBC  --  22.6*  --  8.7   HGB 12.7 13.2 14.8 14.1   MCV  --  93  --  93   PLT  --  224  --  258      Most Recent 3 BMP's:  Recent Labs   Lab Test 02/04/22  0010 02/03/22  0553 01/25/22  1001 05/13/21  0636 04/09/21  0849   NA  --   --   --  140  --    POTASSIUM  --   --   --  4.1  --    CHLORIDE  --   --   --  107  --    CO2  --   --   --  30  --    BUN 18  --   --  12  --    CR 1.27*  --  0.82 0.88 0.93   ANIONGAP  --   --   --  3  --    YVONNE  --   --   --  8.6  --    GLC  --  84  --  96 99     Most Recent 2 LFT's:  Recent Labs   Lab Test 05/13/21  0636   AST 14   ALT 18   ALKPHOS 81   BILITOTAL 0.7     Most Recent INR's and Anticoagulation Dosing History:  Anticoagulation Dose History     Recent Dosing and Labs Latest Ref Rng & Units 5/13/2021    INR 0.86 - 1.14 1.00        Most Recent 3 Troponin's:No lab results found.  Most Recent Cholesterol Panel:  Recent Labs   Lab Test 05/13/21  0636   CHOL 193   *   HDL 58   TRIG 77     Most Recent 6 Bacteria Isolates From Any Culture (See EPIC Reports for Culture Details):No lab results found.  Most Recent TSH, T4 and A1c Labs:  Recent Labs   Lab Test 05/13/21 0636   A1C 5.2     Results for orders placed or performed during the hospital encounter of 02/03/22   POC US Guidance Needle Placement    Impression    B/L QL Block

## 2022-02-05 PROCEDURE — 250N000011 HC RX IP 250 OP 636: Performed by: STUDENT IN AN ORGANIZED HEALTH CARE EDUCATION/TRAINING PROGRAM

## 2022-02-05 PROCEDURE — 250N000013 HC RX MED GY IP 250 OP 250 PS 637: Performed by: STUDENT IN AN ORGANIZED HEALTH CARE EDUCATION/TRAINING PROGRAM

## 2022-02-05 PROCEDURE — 250N000011 HC RX IP 250 OP 636: Performed by: NURSE PRACTITIONER

## 2022-02-05 PROCEDURE — 120N000011 HC R&B TRANSPLANT UMMC

## 2022-02-05 RX ADMIN — ACETAMINOPHEN 650 MG: 325 TABLET, FILM COATED ORAL at 07:32

## 2022-02-05 RX ADMIN — OXYCODONE HYDROCHLORIDE 5 MG: 5 TABLET ORAL at 16:38

## 2022-02-05 RX ADMIN — SENNOSIDES AND DOCUSATE SODIUM 2 TABLET: 50; 8.6 TABLET ORAL at 19:43

## 2022-02-05 RX ADMIN — OXYCODONE HYDROCHLORIDE 5 MG: 5 TABLET ORAL at 19:43

## 2022-02-05 RX ADMIN — FAMOTIDINE 20 MG: 20 TABLET ORAL at 07:33

## 2022-02-05 RX ADMIN — SENNOSIDES AND DOCUSATE SODIUM 2 TABLET: 50; 8.6 TABLET ORAL at 07:33

## 2022-02-05 RX ADMIN — ACETAMINOPHEN 650 MG: 325 TABLET, FILM COATED ORAL at 02:27

## 2022-02-05 RX ADMIN — ONDANSETRON 4 MG: 4 TABLET, ORALLY DISINTEGRATING ORAL at 18:02

## 2022-02-05 RX ADMIN — KETOROLAC TROMETHAMINE 10 MG: 15 INJECTION, SOLUTION INTRAMUSCULAR; INTRAVENOUS at 02:27

## 2022-02-05 RX ADMIN — HEPARIN SODIUM 5000 UNITS: 5000 INJECTION, SOLUTION INTRAVENOUS; SUBCUTANEOUS at 06:03

## 2022-02-05 RX ADMIN — HEPARIN SODIUM 5000 UNITS: 5000 INJECTION, SOLUTION INTRAVENOUS; SUBCUTANEOUS at 19:43

## 2022-02-05 RX ADMIN — OXYCODONE HYDROCHLORIDE 5 MG: 5 TABLET ORAL at 13:31

## 2022-02-05 RX ADMIN — OXYCODONE HYDROCHLORIDE 5 MG: 5 TABLET ORAL at 23:30

## 2022-02-05 RX ADMIN — ONDANSETRON 4 MG: 4 TABLET, ORALLY DISINTEGRATING ORAL at 09:15

## 2022-02-05 RX ADMIN — Medication 10 MG: at 07:24

## 2022-02-05 ASSESSMENT — ACTIVITIES OF DAILY LIVING (ADL)
ADLS_ACUITY_SCORE: 7

## 2022-02-05 ASSESSMENT — MIFFLIN-ST. JEOR: SCORE: 1668.04

## 2022-02-05 NOTE — PROGRESS NOTES
Transplant Surgery  Inpatient Daily Progress Note  02/05/2022    Ms. López is Post-operative day #2 s/p laparoscopic left donor nephrectomy. Issues Overnight: Tolerated      Patient Vitals for the past 24 hrs:   BP Temp Temp src Pulse Resp SpO2 Weight   02/05/22 0600 128/83 98.6  F (37  C) Oral 86 16 97 % --   02/05/22 0241 -- -- -- -- -- -- 98.7 kg (217 lb 11.2 oz)   02/05/22 0223 122/75 98.7  F (37.1  C) Oral 82 15 97 % --   02/04/22 2132 116/69 98.8  F (37.1  C) Oral 88 15 95 % --   02/04/22 1723 135/83 98.8  F (37.1  C) Oral 80 16 96 % --   02/04/22 1435 121/75 98.4  F (36.9  C) Oral 89 18 98 % --       Pain: Biggest complaint is headache.  Nausea:    []    None      []    Some, but not needing medication    [x]    Yes, needing medication      []    Dry Retching    []    Vomited    DREAMS Performance:    DRinking: Yes   Eating: Yes tolerating regular diet well   Analgesia:  Tylenol, oxycodone PRN   Mobilizing:  Up in room with assist   Slept: Yes    Passed flatus? Yes   Incision(s): Incision and lap sites covered, C/D/I with no sumit-incisional ecchymoses  Abdomen: mild distention, appropriately tender, soft  Extremities: no cyanosis or edema     Allergies:   No Known Allergies    Medications:  Prescription Medications as of 2/5/2022       Rx Number Disp Refills Start End Last Dispensed Date Next Fill Date Owning Pharmacy    acetaminophen (TYLENOL) 325 MG tablet  60 tablet 0 2/4/2022    [address]    Sig: Take 2 tablets (650 mg) by mouth every 6 hours    Class: E-Prescribe    Route: Oral    ondansetron (ZOFRAN-ODT) 4 MG ODT tab  10 tablet 0 2/4/2022    [address]    Sig: Take 1 tablet (4 mg) by mouth every 6 hours as needed for nausea or vomiting    Class: E-Prescribe    Route: Oral    oxyCODONE (ROXICODONE) 5 MG tablet  15 tablet 0 2/4/2022    [facility] [place],  "[place] - [address] [facility]    Sig: Take 1-2 tablets (5-10 mg) by mouth every 4 hours as needed for moderate to severe pain    Class: E-Prescribe    Earliest Fill Date: 2/4/2022    Route: Oral    senna-docusate (SENOKOT-S/PERICOLACE) 8.6-50 MG tablet  60 tablet 0 2/4/2022    [facility] - [address]    Sig: Take 1-2 tablets by mouth 2 times daily as needed for constipation    Class: E-Prescribe    Route: Oral      Hospital Medications as of 2/5/2022       Dose Frequency Start End    acetaminophen (TYLENOL) tablet 650 mg (Completed) 650 mg EVERY 6 HOURS 2/3/2022 2/5/2022    Admin Instructions: Administer for multimodal surgical pain management.  Pharmacy to time the next dose 8 hours from PRE OP dose.  Maximum acetaminophen dose from all sources = 75 mg/kg/day not to exceed 4 grams/day.    Class: E-Prescribe    Route: Oral    bisacodyl (DULCOLAX) Suppository 10 mg 10 mg DAILY 2/4/2022     Admin Instructions: Start POD 1, then daily until BM. Hold for loose stools.  Hold for loose stools.    Class: E-Prescribe    Route: Rectal    bupivacaine liposome (EXPAREL) LA inj was given in the infiltration site to produce post-op analgesia. Duration of action is up to 72 hours. Other \"shon\" meds should not be given for 96 hours except for lidocaine 4% patch. This is for INFORMATION ONLY.  CONTINUOUS PRN 2/3/2022 2/7/2022    Admin Instructions: NURSE to notify physician if patient has ringing in the ears, metallic taste in the mouth, or circumoral numbness. DO NOT administer any additional local anesthetics POST-OPERATIVELY without contacting Anesthesia first.    Class: E-Prescribe    Route: Does not apply    famotidine (PEPCID) tablet 20 mg 20 mg DAILY 2/3/2022     Class: E-Prescribe    Route: Oral    heparin ANTICOAGULANT injection 5,000 Units 5,000 Units EVERY 12 HOURS 2/4/2022     Admin Instructions: HOLD heparin IF platelet count falls below 50% baseline or less than 100,000 /  L " "and notify provider. Use this product If CrCl less than 30 mL/min.  High concentration heparin. Not for line flush or cath care.    Class: E-Prescribe    Route: Subcutaneous    ketorolac (TORADOL) injection 10 mg (Completed) 10 mg EVERY 8 HOURS 2/3/2022 2/5/2022    Admin Instructions: Pharmacy to time the first dose based on the timing of the INTRA-OP dose.  Can cause pain on injection.  If ordered intravenously (IV) : administer through a running maintenance fluid over 1 minute followed by a flush.  If patient complains of pain on injection, may dilute 15-30 mg in 5 mL and push over 1 to 2 minutes.      Class: E-Prescribe    Route: Intravenous    naloxone (NARCAN) injection 0.2 mg 0.2 mg EVERY 2 MIN PRN 2/3/2022     Admin Instructions: Administer intravenous route when available and notify provider when administered.  For unintended sedation or respiratory depression if all of the below criteria are met:  ~ respiratory rate LESS than or EQUAL to 8.   ~SaO2 less than 92% and or/end-tidal CO2 is greater than 50.  ~ the patient is receiving an opioid, has unintended sedations assessed as RASS (-3), and is currently not on mechanical ventilation.  RASS scale moderate (-3) is movement or eye opening to voice but no eye contact.    Patient Monitoring  Once the patient has demonstrated a response to the naloxone, continue to monitor respiratory rate, depth, oxygen saturation and end-tidal CO2 (if available) every 15 minutes x 2, then every 30 minutes x 2, then every 1 hour x 1 after each naloxone dose.  Consider transfer to ICU if patient respiratory parameters have not improved after 4 naloxone doses.    Class: E-Prescribe    Route: Intravenous    Linked Group 1: \"Or\" Linked Group Details        naloxone (NARCAN) injection 0.2 mg 0.2 mg EVERY 2 MIN PRN 2/3/2022     Admin Instructions: Administer intramuscular if an intravenous route is not available and notify provider when administered.  For unintended sedation or " "respiratory depression if all of the below criteria are met:  ~ respiratory rate LESS than or EQUAL to 8.   ~SaO2 less than 92% and or/end-tidal CO2 is greater than 50.  ~ the patient is receiving an opioid, has unintended sedations assessed as RASS (-3), and is currently not on mechanical ventilation.  RASS scale moderate (-3) is movement or eye opening to voice but no eye contact.    Patient Monitoring  Once the patient has demonstrated a response to the naloxone, continue to monitor respiratory rate, depth, oxygen saturation and end-tidal CO2 (if available) every 15 minutes x 2, then every 30 minutes x 2, then every 1 hour x 1 after each naloxone dose.  Consider transfer to ICU if patient respiratory parameters have not improved after 4 naloxone doses.    Class: E-Prescribe    Route: Intramuscular    Linked Group 1: \"Or\" Linked Group Details        naloxone (NARCAN) injection 0.4 mg 0.4 mg EVERY 2 MIN PRN 2/3/2022     Admin Instructions: Administer intravenous route when available and notify provider when administered.  For unintended sedation or respiratory depression if all of the below criteria are met:  ~ respiratory rate LESS than or EQUAL to 8.  ~ SaO2 less than 92% and or/end-tidal CO2 is greater than 50.  ~ the patient is receiving an opioid, has unintended sedation assessed as RASS (-4) or (-5) and patient is currently not on mechanical ventilation.  RASS scale (-4) is deep sedation with no response to voice but movement or eye opening to physical stimulation.  RASS scale (-5) is unarousable.      Patient Monitoring  Once the patient has demonstrated a response to the naloxone, continue to monitor respiratory rate, depth, oxygen saturation and end-tidal CO2 (if available) every 15 minutes x 2, then every 30 minutes x 2, then every 1 hour x 1 after each naloxone dose.  Consider transfer to ICU if patient respiratory parameters have not improved after 4 naloxone doses.    Class: E-Prescribe    Route: " "Intravenous    Linked Group 1: \"Or\" Linked Group Details        naloxone (NARCAN) injection 0.4 mg 0.4 mg EVERY 2 MIN PRN 2/3/2022     Admin Instructions: Administer intramuscular if an intravenous route is not available and notify provider when administered.  For unintended sedation or respiratory depression if all of the below criteria are met:  ~ respiratory rate LESS than or EQUAL to 8.  ~ SaO2 less than 92% and or/end-tidal CO2 is greater than 50.  ~ the patient is receiving an opioid, has unintended sedation assessed as RASS (-4) or (-5) and patient is currently not on mechanical ventilation.  RASS scale (-4) is deep sedation with no response to voice but movement or eye opening to physical stimulation.  RASS scale (-5) is unarousable.      Patient Monitoring  Once the patient has demonstrated a response to the naloxone, continue to monitor respiratory rate, depth, oxygen saturation and end-tidal CO2 (if available) every 15 minutes x 2, then every 30 minutes x 2, then every 1 hour x 1 after each naloxone dose.  Consider transfer to ICU if patient respiratory parameters have not improved after 4 naloxone doses.    Class: E-Prescribe    Route: Intramuscular    Linked Group 1: \"Or\" Linked Group Details        ondansetron (ZOFRAN) injection 4 mg 4 mg EVERY 6 HOURS PRN 2/3/2022     Admin Instructions: This is Step 1 of nausea and vomiting management.  If nausea not resolved in 15 minutes, go to Step 2 prochlorperazine (COMPAZINE).  Irritant.    Class: E-Prescribe    Route: Intravenous    Linked Group 2: \"Or\" Linked Group Details        ondansetron (ZOFRAN-ODT) ODT tab 4 mg 4 mg EVERY 6 HOURS PRN 2/3/2022     Admin Instructions: This is Step 1 of nausea and vomiting management.  If nausea not resolved in 15 minutes, go to Step 2 prochlorperazine (COMPAZINE). Do not push through foil backing. Peel back foil and gently remove. Place on tongue immediately. Administration with liquid unnecessary  With dry hands, peel " "back foil backing and gently remove tablet. Do not push oral disintegrating tablet through foil backing. Administer immediately on tongue and oral disintegrating tablet dissolves in seconds, then swallow with saliva. Liquid not required.    Class: E-Prescribe    Route: Oral    Linked Group 2: \"Or\" Linked Group Details        oxyCODONE (ROXICODONE) tablet 5-10 mg 5-10 mg EVERY 3 HOURS PRN 2/3/2022     Admin Instructions: ~ Start at the lowest dose.  ~ May adjust dose by 5 mg every 3 hours to optimize patient mobilization and comfort as needed.  Maximum individual dose is 10 mg.  ~ Hold dose for analgesic side effects.    ~ Notify provider to assess patient for uncontrolled pain or analgesic side effects.   ~ Maximum total is 80 mg in 24 hours.    Route: Oral    prochlorperazine (COMPAZINE) injection 10 mg 10 mg EVERY 6 HOURS PRN 2/3/2022     Admin Instructions: This is Step 2 of nausea and vomiting management.   If nausea not resolved in 15-30 minutes, Notify provider.    Class: E-Prescribe    Route: Intravenous    Linked Group 3: \"Or\" Linked Group Details        prochlorperazine (COMPAZINE) tablet 10 mg 10 mg EVERY 6 HOURS PRN 2/3/2022     Admin Instructions: This is Step 2 of nausea and vomiting management.   If nausea not resolved in 15-30 minutes, Notify provider.    Class: E-Prescribe    Route: Oral    Linked Group 3: \"Or\" Linked Group Details        senna-docusate (SENOKOT-S/PERICOLACE) 8.6-50 MG per tablet 1 tablet 1 tablet 2 TIMES DAILY 2/3/2022     Admin Instructions: If no bowel movement in 24 hours, increase to 2 tablets PO.  Hold for loose stools.  Hold for loose stools.    Class: E-Prescribe    Route: Oral    Linked Group 4: \"Or\" Linked Group Details        senna-docusate (SENOKOT-S/PERICOLACE) 8.6-50 MG per tablet 2 tablet 2 tablet 2 TIMES DAILY 2/3/2022     Admin Instructions: Hold for loose stools.  Hold for loose stools.    Class: E-Prescribe    Route: Oral    Linked Group 4: \"Or\" Linked Group " Details              Labs:  Cr 1.3 (0.9)  Hgb 12.7 (13.2)    Assessment: Post-operative day #2, doing OK. Had some nausea this morning. Continued to have bowel function.     Plan:   -Encouraged ambulation and ISB   -Continue GI and DVT prophylaxis  -Pain control: Scheduled acetaminophen, Toradol.     Discharge planning: Today vs. Tomorrow.

## 2022-02-05 NOTE — PROGRESS NOTES
"  [id number]    /83 (BP Location: Right arm)   Pulse 80   Temp 98.8  F (37.1  C) (Oral)   Resp 16   Ht 1.715 m (5' 7.5\")   Wt 103 kg (227 lb 1.2 oz)   LMP 01/17/2022   SpO2 96%   BMI 35.04 kg/m      VS: VSS, RA, Afebrile. A&Ox4.   Pain/Nausea/PRN: Reported nausea x1, zofran given PO, pt reported effective. Oxy given x3, scheduled Toradol given, pt reported effective.   Diet: Regular diet, good intake.   LDA: PIV x2, SL  GI: 1 BM this shift  : Voiding good amounts, saving in hat.   Skin: Abdominal midline incision with lap sites x2, dressing in place, no output.   Mobility: Pt ambulated in whyte x3 with family and to bathroom.   Plan: Pt aware of discharge plan for tomorrow. Family at bedside and attentive to pt.  Will continue to monitor and will update provider with changes in condition.   "

## 2022-02-05 NOTE — PLAN OF CARE
"/75 (BP Location: Right arm)   Pulse 82   Temp 98.7  F (37.1  C) (Oral)   Resp 15   Ht 1.715 m (5' 7.5\")   Wt 98.7 kg (217 lb 11.2 oz)   LMP 01/17/2022   SpO2 97%   BMI 33.59 kg/m    [id number]  Neuro: Pt. alert & Ox4  Behavior: Pt. calm & Ox4  Activity: Pt. up ad gabby.  Vital: AVSS on RA  LDAs: Right & left PIVs saline locked.  Cardiac: WNL  Respiratory: LS clear  GI/: Pt. voiding with adequate amounts. No stools this shift. Last BM 2/4.  Skin: Primapores over incision & lap. sites & dressings c/d/I.  Pain: Abdominal pain relieved with Tylenol & Toradol.  Diet: Regular  Labs/Imaging: Morning labs pending.  Plan: Pt. will likely discharge today. Continue to follow POC & notify MD with change in status.               "

## 2022-02-06 VITALS
DIASTOLIC BLOOD PRESSURE: 71 MMHG | HEIGHT: 68 IN | OXYGEN SATURATION: 97 % | BODY MASS INDEX: 32.34 KG/M2 | RESPIRATION RATE: 16 BRPM | SYSTOLIC BLOOD PRESSURE: 108 MMHG | WEIGHT: 213.41 LBS | TEMPERATURE: 98.8 F | HEART RATE: 78 BPM

## 2022-02-06 PROCEDURE — 99024 POSTOP FOLLOW-UP VISIT: CPT | Performed by: SURGERY

## 2022-02-06 PROCEDURE — 250N000013 HC RX MED GY IP 250 OP 250 PS 637: Performed by: STUDENT IN AN ORGANIZED HEALTH CARE EDUCATION/TRAINING PROGRAM

## 2022-02-06 PROCEDURE — 250N000011 HC RX IP 250 OP 636: Performed by: NURSE PRACTITIONER

## 2022-02-06 RX ADMIN — FAMOTIDINE 20 MG: 20 TABLET ORAL at 07:20

## 2022-02-06 RX ADMIN — OXYCODONE HYDROCHLORIDE 5 MG: 5 TABLET ORAL at 05:55

## 2022-02-06 RX ADMIN — Medication 10 MG: at 07:20

## 2022-02-06 RX ADMIN — HEPARIN SODIUM 5000 UNITS: 5000 INJECTION, SOLUTION INTRAVENOUS; SUBCUTANEOUS at 05:55

## 2022-02-06 RX ADMIN — SENNOSIDES AND DOCUSATE SODIUM 2 TABLET: 50; 8.6 TABLET ORAL at 07:19

## 2022-02-06 ASSESSMENT — ACTIVITIES OF DAILY LIVING (ADL)
ADLS_ACUITY_SCORE: 7

## 2022-02-06 ASSESSMENT — MIFFLIN-ST. JEOR: SCORE: 1648.56

## 2022-02-06 NOTE — PROGRESS NOTES
"[id number]  Donor team    DISCHARGE:  Patient with orders to discharge to home.     Education Provided:   Handouts incision care  Removed left peripheral IV, minimal bleeding    Discharge instructions, medications & follow ups reviewed with patient and  at beside. Patient educated on symptoms for when to notify provider. Copy of discharge summary given to patient. Belongings at bedside taken by patient.        Patient in stable condition. AVSS. Patient had no further questions regarding discharge instructions and medications. Patient transferred out by  via wheelchair. No belongings left in room.        /71 (BP Location: Right arm)   Pulse 78   Temp 98.8  F (37.1  C) (Oral)   Resp 16   Ht 1.715 m (5' 7.5\")   Wt 96.8 kg (213 lb 6.5 oz)   LMP 01/17/2022   SpO2 97%   BMI 32.93 kg/m      VS: VSS, RA, afebrile, A&O x 4  Labs: no new labs of note  Pain/Nausea/PRN: denies nausea. pain minimal, treated with PRN Oxycodone x 1 given by previous nurse   Diet: regular, good appetite  GI: patient reported constipation, treated with Senna and suppository, patient due to have BM, last BM 2/5/22, patient endorsing plan to take prune juice at home  : voiding good amounts, pale color, fluids encouraged  Skin: incision dressing CDI, small ecchymosis near incision, steri strips in place, no other new skin findings  Mobility: independent, ambulated in whyte x 1 and in room, patient educated on lifting restrictions  Plan : patient discharged in stable condition, team aware    Luisa Infante on 2/6/2022 at 11:56 AM      "

## 2022-02-06 NOTE — PROGRESS NOTES
"  [id number]    Donor team    /70 (BP Location: Right arm)   Pulse 96   Temp 98.9  F (37.2  C) (Oral)   Resp 16   Ht 1.715 m (5' 7.5\")   Wt 98.7 kg (217 lb 11.2 oz)   LMP 01/17/2022   SpO2 99%   BMI 33.59 kg/m      VS: VSS, RA, Afebrile. One instance of lightheadedness resolved with bedrest.   Pain/Nausea/PRN: Pt reported moderate pain intermittent with activity, PRN oxy given, pt reported effective. Nausea with no emesis x2, zofran PO given, pt reported effective.   Diet: Regular diet, good intake.   LDA:  PIV SL, Right PIV removed.   GI: BM after AM suppository.    : Voiding good amounts, saving in hat.   Skin: Abdominal incision, prima-pore changed after shower, steri strips in place, no drainage, minimal bruising or erythema.   Mobility: SBA, ambulated in whyte and room.   Plan: Plan for discharge tomorrow. Will continue to monitor and will update provider with changes in condition.     "

## 2022-02-06 NOTE — PLAN OF CARE
"/71 (BP Location: Right arm)   Pulse 78   Temp 98.8  F (37.1  C) (Oral)   Resp 16   Ht 1.715 m (5' 7.5\")   Wt 96.8 kg (213 lb 6.5 oz)   LMP 01/17/2022   SpO2 97%   BMI 32.93 kg/m      8930-5050  Neuro: Pt. alert & Ox4  Behavior: Pt. calm & Ox4  Activity: Pt. up ad gabby.  Vital: AVSS on RA  LDAs: Left PIV saline locked.  Cardiac: WNL  Respiratory: LS clear  GI/: Pt. voiding with adequate amounts. No stools this shift. Last BM 2/5.  Skin: Primapores over incision & lap. sites & dressings c/d/I.  Pain: Abdominal pain relieved with prn Oxycodone x2  Nausea: Pt. denies nausea.  Diet: Regular  Labs/Imaging: Morning labs pending.  Plan: Pt. will likely discharge today. Continue to follow POC & notify MD with change in status.   "

## 2022-02-07 ENCOUNTER — TELEPHONE (OUTPATIENT)
Dept: TRANSPLANT | Facility: CLINIC | Age: 47
End: 2022-02-07
Payer: COMMERCIAL

## 2022-02-07 NOTE — TELEPHONE ENCOUNTER
Post-hospital outreach call made to check in post kidney donation.     Zora reports overall feeling okay.    Incision:CDI, no increased redness warmth, drainage or swelling  Pain: controled with tylenol during the day and oxycodone just at HS  Bowels: last BM 2/5 evening, passing gas, not feeling constipated, no nausea, abd not distended, taking stool softeners  Appetite: okay, tolerating small meals  Fluids: taking adequate water  Urinary: voiding without difficulty, urine appears normal  Activity: alternating periods of activity with rest, is using abd binder when up, knows to remove when resting. Using IS.     Reviewed: abdominal precautions, lifting restrictions.  Next appointment: 2/15 with Dr. Christine Blakely knows how to get in touch with me or an on call coordinator with any questions or concerns.

## 2022-02-07 NOTE — PROGRESS NOTES
Transplant Surgery Consult Note    Medical record number: [id number]  YOB: 1975,   Consult requested by the patient for evaluation of kidney donation candidacy.    Assessment and Recommendations: Ms. López appears to be a good candidate for kidney donation at this point in the evaluation. The following issues will need to be addressed prior to formal review:    I personally saw and reviewed all imaging and labs with Dania Woody.  She has remained healthy.  She is in her normal state of health and has no symptoms consistent with COVID-19 infection.  She is donating to her father.  Their blood type compatible and the most recent crossmatch is negative.  I will plan for a laparoscopic hand-assisted left donor nephrectomy.  Consent was obtained.  All risks were discussed again.  All questions were answered.  She was in agreement and wished to proceed.     The majority of our visit today was spent in counseling regarding the medical and surgical risks of kidney donation, the typical sumit-and post-operative experience and recovery/return to work pattern.  We also talked about post-op visits and longer term health care maintenance, as well as the implications of having one remaining kidney. This discussion included, but was not limited to rates of complications such as bleeding, infection, need for transfusion, reoperation, other organ injury, future bowel obstruction, incisional hernia, port site pain, varicocele, venous thrombosis, pulmonary embolism, renal failure, and death (3 per 10,000). The patient understands that if end stage renal failure happens that dialysis or transplant would be required.   At the conclusion of the visit, all questions had been answered and Ms. López's candidacy for donation will be reviewed at our Multidisciplinary Donor Selection Committee. She will stay in contact with the nurse coordinator with any concerns.      Total time: 35 minutes  Counselling time: 30  minutes      Florence King MD FACS  Assistant Professor of Surgery  Director, Living Kidney Donor Program.  ---------------------------------------------------------------------------------------------------    HPI: Zora López is a 46 year old year old female who presents for a kidney donor evaluation.  Patient would like to donate to her father.      Personal history of:   No    Yes  Cancer:    [x]      []             Comment:     Diabetes   [x]      []  Comment:    Thombosis   [x]      []  Comment:       Hepatitis   [x]      []  Comment:    Tuberculosis   [x]      []  Comment:   Back or neck pain:  [x]      []  Comment:      Kidney stones   [x]      []  Comment:                  Kidney infections  [x]      []  Comment:           Urinary retention  [x]      []            Comment:   Regular NSAID use:  [x]      []            Comment:      Constipation:   [x]      []            Comment:      Worship  []      []            Comment:      Other:    []      [x]            Comment: Obesity but she has lost weight.       Past Medical History:   Diagnosis Date     Miscarriage     x2 in the first trimester     Plantar fasciitis      Past Surgical History:   Procedure Laterality Date     APPENDECTOMY       LAPAROSCOPIC DONOR HAND ASSISTED KIDNEY LIVING RELATED N/A 2/3/2022    Procedure: Laparoscopic Hand Assisted Living Related Kidney Donor;  Surgeon: Florence King MD;  Location:  OR     Family History   Problem Relation Age of Onset     Melanoma Mother      Diabetes Father      Hypertension Father      Kidney Disease Father      Diabetes Brother      Myocardial Infarction Paternal Grandfather      Leukemia Maternal Grandmother      Social History     Socioeconomic History     Marital status:      Spouse name: Not on file     Number of children: 2     Years of education: Not on file     Highest education level: Not on file   Occupational History     Not on file   Tobacco Use      Smoking status: Never Smoker     Smokeless tobacco: Never Used   Substance and Sexual Activity     Alcohol use: Yes     Alcohol/week: 3.0 - 4.0 standard drinks     Types: 3 - 4 Standard drinks or equivalent per week     Comment: 3-4 drinks a week     Drug use: Never     Sexual activity: Not on file   Other Topics Concern     Not on file   Social History Narrative     Not on file     Social Determinants of Health     Financial Resource Strain: Not on file   Food Insecurity: Not on file   Transportation Needs: Not on file   Physical Activity: Not on file   Stress: Not on file   Social Connections: Not on file   Intimate Partner Violence: Not on file   Housing Stability: Not on file       ROS:   CONSTITUTIONAL:  No fevers or chills  EYES: negative for icterus  ENT:  negative for hearing loss, tinnitus and sore throat  RESPIRATORY:  negative for cough, sputum, dyspnea  CARDIOVASCULAR:  negative for chest pain  GASTROINTESTINAL:  negative for nausea, vomiting, diarrhea or constipation  GENITOURINARY:  negative for incontinence, dysuria, bladder emptying problems  HEME:  No easy bruising  INTEGUMENT:  negative for rash and pruritus  NEURO:  Negative for headache, seizure disorder    Allergies:   No Known Allergies    Medications:  Prescription Medications as of 2/7/2022       Rx Number Disp Refills Start End Last Dispensed Date Next Fill Date Owning Pharmacy    acetaminophen (TYLENOL) 325 MG tablet  60 tablet 0 2/4/2022    [address]    Sig: Take 2 tablets (650 mg) by mouth every 6 hours    Class: E-Prescribe    Route: Oral    ondansetron (ZOFRAN-ODT) 4 MG ODT tab  10 tablet 0 2/4/2022    [address]    Sig: Take 1 tablet (4 mg) by mouth every 6 hours as needed for nausea or vomiting    Class: E-Prescribe    Route: Oral    oxyCODONE (ROXICODONE) 5 MG tablet  15 tablet 0 2/4/2022    [facility]  [facility] - [place], [place] - [address] [facility]    Sig: Take 1-2 tablets (5-10 mg) by mouth every 4 hours as needed for moderate to severe pain    Class: E-Prescribe    Earliest Fill Date: 2/4/2022    Route: Oral    senna-docusate (SENOKOT-S/PERICOLACE) 8.6-50 MG tablet  60 tablet 0 2/4/2022    [facility] - [place], [place] - [address] [facility]    Sig: Take 1-2 tablets by mouth 2 times daily as needed for constipation    Class: E-Prescribe    Route: Oral          Exam:      There is no height or weight on file to calculate BMI.     Appearance: in no apparent distress.   Skin: normal  Head and Neck: Normal, no rashes or jaundice  Respiratory: normal respiratory excursions, no audible wheeze  Cardiovascular: RRR  Abdomen: rounded and obese, No distention and surgical scars consistent with appendectomy history.  Extremeties: Edema, none  Neuro: without deficit     Lab on 01/25/2022   Component Date Value Ref Range Status     CMV Julia IgG Instrument Value 01/25/2022 <0.20  <0.60 U/mL Final     CMV Antibody IgG 01/25/2022 No detectable antibody.  No detectable antibody.  Final     EBV Capsid Julia IgG Instrument Value 01/25/2022 <10.0  <18.0 U/mL Final     EBV Capsid Antibody IgG 01/25/2022 No detectable antibody.  No detectable antibody. Final     EBV Capsid Julia IgM Instrument Value 01/25/2022 <10.0  <36.0 U/mL Final     EBV Capsid Antibody IgM 01/25/2022 No detectable antibody.  No detectable antibody. Final     hCG Quantitative 01/25/2022 <1  0 - 5 IU/L Final    Adult: 0-5 IU/L for healthy non-pregnant person  Neonates: Should be within normal ranges by 2 days after birth     Color Urine 01/25/2022 Colorless  Colorless, Straw, Light Yellow, Yellow Final     Appearance Urine 01/25/2022 Clear  Clear Final     Glucose Urine 01/25/2022 Negative  Negative mg/dL Final     Bilirubin Urine 01/25/2022 Negative  Negative Final     Ketones Urine 01/25/2022 Negative  Negative mg/dL Final     Specific Gravity  Urine 01/25/2022 1.002* 1.003 - 1.035 Final     Blood Urine 01/25/2022 Negative  Negative Final     pH Urine 01/25/2022 6.0  5.0 - 7.0 Final     Protein Albumin Urine 01/25/2022 Negative  Negative mg/dL Final     Urobilinogen Urine 01/25/2022 Normal  Normal, 2.0 mg/dL Final     Nitrite Urine 01/25/2022 Negative  Negative Final     Leukocyte Esterase Urine 01/25/2022 Negative  Negative Final     Bacteria Urine 01/25/2022 Few* None Seen /HPF Final     RBC Urine 01/25/2022 <1  <=2 /HPF Final     WBC Urine 01/25/2022 1  <=5 /HPF Final     Squamous Epithelials Urine 01/25/2022 <1  <=1 /HPF Final     Hemoglobin 01/25/2022 14.8  11.7 - 15.7 g/dL Final     Hepatitis B Core Antibody Total 01/25/2022 Nonreactive  Nonreactive Final     Hepatitis B Surface Antigen 01/25/2022 Nonreactive  Nonreactive Final     Hepatitis C Antibody 01/25/2022 Nonreactive  Nonreactive Final     HIV Antigen Antibody Combo Pretran* 01/25/2022 Nonreactive  Nonreactive Final    HIV-1 p24 Ag & HIV-1/HIV-2 Ab Not Detected     HEPATITIS B BY RAKEL 01/25/2022 Non-Reactive   Final     HCV by RAKEL 01/25/2022 Non-Reactive   Final     HIV By Rakel 01/25/2022 Non-Reactive   Final     West Nile Virus By RAKEL 01/25/2022 Non-Reactive   Final     Creatinine 01/25/2022 0.82  0.52 - 1.04 mg/dL Final     GFR Estimate 01/25/2022 89  >60 mL/min/1.73m2 Final    Effective December 21, 2021 eGFRcr in adults is calculated using the 2021 CKD-EPI creatinine equation which includes age and gender (Fatuma et al., NEJ, DOI: 10.1056/FREFrf8689429)     SARS CoV2 PCR 01/25/2022 Negative  Negative, Testing sent to reference lab. Results will be returned via unsolicited result Final    NEGATIVE: SARS-CoV-2 (COVID-19) RNA not detected, presumed negative.     ABO/RH(D) 01/25/2022 O POS   Final     Antibody Screen 01/25/2022 Negative  Negative Final     SPECIMEN EXPIRATION DATE 01/25/2022 [id number]   Final

## 2022-02-09 ENCOUNTER — TELEPHONE (OUTPATIENT)
Dept: TRANSPLANT | Facility: CLINIC | Age: 47
End: 2022-02-09
Payer: COMMERCIAL

## 2022-02-09 NOTE — TELEPHONE ENCOUNTER
The following photos of port sites received. Advised her to call with any increased redness, warmth, swelling, pain or drainage. She knows to leave the steri strips in place.

## 2022-02-09 NOTE — TELEPHONE ENCOUNTER
Post-hospital outreach call made to check in post kidney donation.     Zora reports overall feeling okay.    Incision:CDI, no redness, pain, swelling or warmth, noted port sites seem a little raised, she will send a photo   Pain: managed with tylenol only   Bowels: having consistent BMs, last today, taking Senakot and miralax   Appetite: improving, tolerating small meals, no nausea   Fluids: taking adequate fluids  Urinary: voiding without difficulty, urine normal  Activity: alternating activity with rest, getting up throughout the day, using abd binder when up.     Reviewed: abdominal precautions, lifting restrictions.  Next appointment: 2/14/22 with Dr. Christine Blakely knows how to get in touch with me or an on call coordinator with any questions or concerns.

## 2022-02-15 ENCOUNTER — DOCUMENTATION ONLY (OUTPATIENT)
Dept: TRANSPLANT | Facility: CLINIC | Age: 47
End: 2022-02-15
Payer: COMMERCIAL

## 2022-02-15 ENCOUNTER — OFFICE VISIT (OUTPATIENT)
Dept: TRANSPLANT | Facility: CLINIC | Age: 47
End: 2022-02-15
Attending: SURGERY
Payer: COMMERCIAL

## 2022-02-15 VITALS
WEIGHT: 205 LBS | HEART RATE: 88 BPM | BODY MASS INDEX: 31.63 KG/M2 | OXYGEN SATURATION: 100 % | DIASTOLIC BLOOD PRESSURE: 94 MMHG | SYSTOLIC BLOOD PRESSURE: 137 MMHG

## 2022-02-15 DIAGNOSIS — Z52.4 DONOR OF KIDNEY FOR TRANSPLANT: ICD-10-CM

## 2022-02-15 DIAGNOSIS — Z09 SURGERY FOLLOW-UP: Primary | ICD-10-CM

## 2022-02-15 DIAGNOSIS — Z52.4 ENCOUNTER FOR DONATION OF KIDNEY: ICD-10-CM

## 2022-02-15 PROCEDURE — 99024 POSTOP FOLLOW-UP VISIT: CPT | Performed by: SURGERY

## 2022-02-15 RX ORDER — ONDANSETRON 4 MG/1
4 TABLET, ORALLY DISINTEGRATING ORAL EVERY 6 HOURS PRN
Qty: 20 TABLET | Refills: 0 | Status: SHIPPED | OUTPATIENT
Start: 2022-02-15 | End: 2023-10-16

## 2022-02-15 NOTE — LETTER
2/15/2022     RE: Zora López  [address] [facility] [address]    Dear Colleague,    Thank you for referring your patient, Zora López, to the [facility]. Please see a copy of my visit note below.    Transplant Surgery Kidney Donor Progress Note     Medical record number: [id number]  YOB: 1975,   Date of Visit:  02/15/2022  For followup after kidney donation.    Assessment and Recommendations: Ms. López is doing poorly after kidney donation due to nausea.     1. Lifting restrictions: 10 lbs until 6 weeks post donation.  2. Followup: 4 weeks  3. Return to work to be determined per patient's progress, expected between 6-8 weeks after surgery.  4.  Please continue to monitor nausea and vomiting.  If she is unable to keep herself hydrated we will bring her in for fluids.  We will renew her Zofran prescription.  If this does not resolve in the next week, I will refer her to GI for a few further work-up and likely endoscopy.    Total time: 15 minutes  Counselling time: 10 minutes      Florence King MD FACS  Assistant Professor of Surgery  Director, Living Kidney Donor Program.    ------------------------------------------------------------------------------------------    S: Ms. López donate a kidney almost 2 weeks ago and is doing poorly, reporting no fevers, chills, dysuria.  She is not taking narcotic analgesia.  She is not constipated.  She is not back to routine activities of daily living due to nausea and is not feeling ready to return to work at this time.  Prior to donation she is struggled with intermittent nausea.  She states that this nausea is similar to what she experiences on a normal basis however as seemed a little bit more intense.  She does not appear to be constipated.  She questions if she needs any IV fluids.    Medications:  Prescription Medications as of 2/15/2022       Rx Number Disp Refills Start End Last  Dispensed Date Next Fill Date Owning Pharmacy    acetaminophen (TYLENOL) 325 MG tablet  60 tablet 0 2/4/2022    [address]    Sig: Take 2 tablets (650 mg) by mouth every 6 hours    Class: E-Prescribe    Route: Oral    ondansetron (ZOFRAN-ODT) 4 MG ODT tab  10 tablet 0 2/4/2022    [address]    Sig: Take 1 tablet (4 mg) by mouth every 6 hours as needed for nausea or vomiting    Class: E-Prescribe    Route: Oral    senna-docusate (SENOKOT-S/PERICOLACE) 8.6-50 MG tablet  60 tablet 0 2/4/2022    [address]    Sig: Take 1-2 tablets by mouth 2 times daily as needed for constipation    Class: E-Prescribe    Route: Oral    oxyCODONE (ROXICODONE) 5 MG tablet  15 tablet 0 2/4/2022    [address]    Sig: Take 1-2 tablets (5-10 mg) by mouth every 4 hours as needed for moderate to severe pain    Class: E-Prescribe    Earliest Fill Date: 2/4/2022    Route: Oral          Exam:   Pulse:  [88] 88  BP: (137)/(94) 137/94  SpO2:  [100 %] 100 %  Appearance: in no apparent distress.   Skin: normal  Head and Neck: Normal, no rashes or jaundice  Respiratory: normal respiratory excursions, no audible wheeze  Cardiovascular: RRR  Abdomen: rounded, No distention and incisions clean dry and intact without evidence of infection  Extremeties: Edema, none  Neuro: without deficit       Labs:   Admission on 02/03/2022, Discharged on 02/06/2022   Component Date Value Ref Range Status     Hold Specimen 02/03/2022 Specimen Received   Final     GLUCOSE BY METER POCT 02/03/2022 84  70 - 99 mg/dL Final    Dr/RN Notified     WBC Count 02/03/2022 22.6* 4.0 - 11.0 10e3/uL Final     RBC Count 02/03/2022 4.33  3.80 - 5.20 10e6/uL Final     Hemoglobin 02/03/2022 13.2  11.7 - 15.7 g/dL Final     Hematocrit 02/03/2022 40.2   35.0 - 47.0 % Final     MCV 02/03/2022 93  78 - 100 fL Final     MCH 02/03/2022 30.5  26.5 - 33.0 pg Final     MCHC 02/03/2022 32.8  31.5 - 36.5 g/dL Final     RDW 02/03/2022 12.1  10.0 - 15.0 % Final     Platelet Count 02/03/2022 224  150 - 450 10e3/uL Final     CK 02/03/2022 205  30 - 225 U/L Final     Hemoglobin 02/04/2022 12.7  11.7 - 15.7 g/dL Final     Hematocrit 02/04/2022 39.1  35.0 - 47.0 % Final     Urea Nitrogen 02/04/2022 18  7 - 30 mg/dL Final     Creatinine 02/04/2022 1.27* 0.52 - 1.04 mg/dL Final     GFR Estimate 02/04/2022 53* >60 mL/min/1.73m2 Final    Effective December 21, 2021 eGFRcr in adults is calculated using the 2021 CKD-EPI creatinine equation which includes age and gender (Fatuma et al., [facility], DOI: 10.1056/KREYno1608644)     Color Urine 02/04/2022 Light Yellow  Colorless, Straw, Light Yellow, Yellow Final     Appearance Urine 02/04/2022 Clear  Clear Final     Glucose Urine 02/04/2022 Negative  Negative mg/dL Final     Bilirubin Urine 02/04/2022 Negative  Negative Final     Ketones Urine 02/04/2022 Negative  Negative mg/dL Final     Specific Gravity Urine 02/04/2022 1.017  1.003 - 1.035 Final     Blood Urine 02/04/2022 Negative  Negative Final     pH Urine 02/04/2022 5.5  5.0 - 7.0 Final     Protein Albumin Urine 02/04/2022 10 * Negative mg/dL Final     Urobilinogen Urine 02/04/2022 Normal  Normal, 2.0 mg/dL Final     Nitrite Urine 02/04/2022 Negative  Negative Final     Leukocyte Esterase Urine 02/04/2022 Negative  Negative Final     Mucus Urine 02/04/2022 Present* None Seen /LPF Final     RBC Urine 02/04/2022 0  <=2 /HPF Final     WBC Urine 02/04/2022 3  <=5 /HPF Final     CK 02/04/2022 468* 30 - 225 U/L Final     Hold Specimen 02/04/2022 [facility]   Final     Again, thank you for allowing me to participate in the care of your patient.      Sincerely,    Florence King MD

## 2022-02-15 NOTE — PROGRESS NOTES
Transplant Surgery Kidney Donor Progress Note     Medical record number: [id number]  YOB: 1975,   Date of Visit:  02/15/2022  For followup after kidney donation.    Assessment and Recommendations: Ms. López is doing poorly after kidney donation due to nausea.     1. Lifting restrictions: 10 lbs until 6 weeks post donation.  2. Followup: 4 weeks  3. Return to work to be determined per patient's progress, expected between 6-8 weeks after surgery.  4.  Please continue to monitor nausea and vomiting.  If she is unable to keep herself hydrated we will bring her in for fluids.  We will renew her Zofran prescription.  If this does not resolve in the next week, I will refer her to GI for a few further work-up and likely endoscopy.    Total time: 15 minutes  Counselling time: 10 minutes      Florence King MD FACS  Assistant Professor of Surgery  Director, Living Kidney Donor Program.    ------------------------------------------------------------------------------------------    S: Ms. López donate a kidney almost 2 weeks ago and is doing poorly, reporting no fevers, chills, dysuria.  She is not taking narcotic analgesia.  She is not constipated.  She is not back to routine activities of daily living due to nausea and is not feeling ready to return to work at this time.  Prior to donation she is struggled with intermittent nausea.  She states that this nausea is similar to what she experiences on a normal basis however as seemed a little bit more intense.  She does not appear to be constipated.  She questions if she needs any IV fluids.    Medications:  Prescription Medications as of 2/15/2022       Rx Number Disp Refills Start End Last Dispensed Date Next Fill Date Owning Pharmacy    acetaminophen (TYLENOL) 325 MG tablet  60 tablet 0 2/4/2022    [facility] - [place], [place] - [address] [facility]    Sig: Take 2 tablets (650 mg) by mouth every 6 hours    Class: E-Prescribe     Route: Oral    ondansetron (ZOFRAN-ODT) 4 MG ODT tab  10 tablet 0 2/4/2022    [place], [place] - [address] [place] [address]    Sig: Take 1 tablet (4 mg) by mouth every 6 hours as needed for nausea or vomiting    Class: E-Prescribe    Route: Oral    senna-docusate (SENOKOT-S/PERICOLACE) 8.6-50 MG tablet  60 tablet 0 2/4/2022    [address]    Sig: Take 1-2 tablets by mouth 2 times daily as needed for constipation    Class: E-Prescribe    Route: Oral    oxyCODONE (ROXICODONE) 5 MG tablet  15 tablet 0 2/4/2022    [address]    Sig: Take 1-2 tablets (5-10 mg) by mouth every 4 hours as needed for moderate to severe pain    Class: E-Prescribe    Earliest Fill Date: 2/4/2022    Route: Oral          Exam:   Pulse:  [88] 88  BP: (137)/(94) 137/94  SpO2:  [100 %] 100 %  Appearance: in no apparent distress.   Skin: normal  Head and Neck: Normal, no rashes or jaundice  Respiratory: normal respiratory excursions, no audible wheeze  Cardiovascular: RRR  Abdomen: rounded, No distention and incisions clean dry and intact without evidence of infection  Extremeties: Edema, none  Neuro: without deficit       Labs:   Admission on 02/03/2022, Discharged on 02/06/2022   Component Date Value Ref Range Status     Hold Specimen 02/03/2022 Specimen Received   Final     GLUCOSE BY METER POCT 02/03/2022 84  70 - 99 mg/dL Final    Dr/RN Notified     WBC Count 02/03/2022 22.6* 4.0 - 11.0 10e3/uL Final     RBC Count 02/03/2022 4.33  3.80 - 5.20 10e6/uL Final     Hemoglobin 02/03/2022 13.2  11.7 - 15.7 g/dL Final     Hematocrit 02/03/2022 40.2  35.0 - 47.0 % Final     MCV 02/03/2022 93  78 - 100 fL Final     MCH 02/03/2022 30.5  26.5 - 33.0 pg Final     MCHC 02/03/2022 32.8  31.5 - 36.5 g/dL Final     RDW 02/03/2022 12.1  10.0 - 15.0 % Final     Platelet Count 02/03/2022 224  150 - 450 10e3/uL Final     CK  02/03/2022 205  30 - 225 U/L Final     Hemoglobin 02/04/2022 12.7  11.7 - 15.7 g/dL Final     Hematocrit 02/04/2022 39.1  35.0 - 47.0 % Final     Urea Nitrogen 02/04/2022 18  7 - 30 mg/dL Final     Creatinine 02/04/2022 1.27* 0.52 - 1.04 mg/dL Final     GFR Estimate 02/04/2022 53* >60 mL/min/1.73m2 Final    Effective December 21, 2021 eGFRcr in adults is calculated using the 2021 CKD-EPI creatinine equation which includes age and gender (Fatuma et al., NEJ, DOI: 10.1056/NMRIbz0230974)     Color Urine 02/04/2022 Light Yellow  Colorless, Straw, Light Yellow, Yellow Final     Appearance Urine 02/04/2022 Clear  Clear Final     Glucose Urine 02/04/2022 Negative  Negative mg/dL Final     Bilirubin Urine 02/04/2022 Negative  Negative Final     Ketones Urine 02/04/2022 Negative  Negative mg/dL Final     Specific Gravity Urine 02/04/2022 1.017  1.003 - 1.035 Final     Blood Urine 02/04/2022 Negative  Negative Final     pH Urine 02/04/2022 5.5  5.0 - 7.0 Final     Protein Albumin Urine 02/04/2022 10 * Negative mg/dL Final     Urobilinogen Urine 02/04/2022 Normal  Normal, 2.0 mg/dL Final     Nitrite Urine 02/04/2022 Negative  Negative Final     Leukocyte Esterase Urine 02/04/2022 Negative  Negative Final     Mucus Urine 02/04/2022 Present* None Seen /LPF Final     RBC Urine 02/04/2022 0  <=2 /HPF Final     WBC Urine 02/04/2022 3  <=5 /HPF Final     CK 02/04/2022 468* 30 - 225 U/L Final     Hold Specimen 02/04/2022 [facility]   Final

## 2022-02-16 ENCOUNTER — INFUSION THERAPY VISIT (OUTPATIENT)
Dept: INFUSION THERAPY | Facility: CLINIC | Age: 47
End: 2022-02-16
Attending: SURGERY
Payer: COMMERCIAL

## 2022-02-16 ENCOUNTER — DOCUMENTATION ONLY (OUTPATIENT)
Dept: TRANSPLANT | Facility: CLINIC | Age: 47
End: 2022-02-16
Payer: COMMERCIAL

## 2022-02-16 ENCOUNTER — TELEPHONE (OUTPATIENT)
Dept: TRANSPLANT | Facility: CLINIC | Age: 47
End: 2022-02-16

## 2022-02-16 VITALS
RESPIRATION RATE: 18 BRPM | HEART RATE: 91 BPM | OXYGEN SATURATION: 99 % | DIASTOLIC BLOOD PRESSURE: 85 MMHG | TEMPERATURE: 98.4 F | SYSTOLIC BLOOD PRESSURE: 130 MMHG

## 2022-02-16 DIAGNOSIS — Z52.4 KIDNEY DONOR: ICD-10-CM

## 2022-02-16 DIAGNOSIS — Z52.4 KIDNEY DONOR: Primary | ICD-10-CM

## 2022-02-16 DIAGNOSIS — Z52.4 DONOR OF KIDNEY FOR TRANSPLANT: Primary | ICD-10-CM

## 2022-02-16 LAB
ALBUMIN UR-MCNC: 30 MG/DL
ANION GAP SERPL CALCULATED.3IONS-SCNC: 9 MMOL/L (ref 3–14)
APPEARANCE UR: ABNORMAL
BILIRUB UR QL STRIP: NEGATIVE
BUN SERPL-MCNC: 15 MG/DL (ref 7–30)
CALCIUM SERPL-MCNC: 9.3 MG/DL (ref 8.5–10.1)
CHLORIDE BLD-SCNC: 103 MMOL/L (ref 94–109)
CO2 SERPL-SCNC: 23 MMOL/L (ref 20–32)
COLOR UR AUTO: YELLOW
CREAT SERPL-MCNC: 1.19 MG/DL (ref 0.52–1.04)
ERYTHROCYTE [DISTWIDTH] IN BLOOD BY AUTOMATED COUNT: 11.8 % (ref 10–15)
GFR SERPL CREATININE-BSD FRML MDRD: 57 ML/MIN/1.73M2
GLUCOSE BLD-MCNC: 100 MG/DL (ref 70–99)
GLUCOSE UR STRIP-MCNC: NEGATIVE MG/DL
HCT VFR BLD AUTO: 41.8 % (ref 35–47)
HGB BLD-MCNC: 14.1 G/DL (ref 11.7–15.7)
HGB UR QL STRIP: NEGATIVE
KETONES UR STRIP-MCNC: 20 MG/DL
LEUKOCYTE ESTERASE UR QL STRIP: NEGATIVE
MAGNESIUM SERPL-MCNC: 2.3 MG/DL (ref 1.6–2.3)
MCH RBC QN AUTO: 29.6 PG (ref 26.5–33)
MCHC RBC AUTO-ENTMCNC: 33.7 G/DL (ref 31.5–36.5)
MCV RBC AUTO: 88 FL (ref 78–100)
MUCOUS THREADS #/AREA URNS LPF: PRESENT /LPF
NITRATE UR QL: NEGATIVE
PH UR STRIP: 6 [PH] (ref 5–7)
PHOSPHATE SERPL-MCNC: 2.6 MG/DL (ref 2.5–4.5)
PLATELET # BLD AUTO: 461 10E3/UL (ref 150–450)
POTASSIUM BLD-SCNC: 4 MMOL/L (ref 3.4–5.3)
RBC # BLD AUTO: 4.76 10E6/UL (ref 3.8–5.2)
RBC URINE: 1 /HPF
SODIUM SERPL-SCNC: 135 MMOL/L (ref 133–144)
SP GR UR STRIP: 1.02 (ref 1–1.03)
UROBILINOGEN UR STRIP-MCNC: NORMAL MG/DL
WBC # BLD AUTO: 14.2 10E3/UL (ref 4–11)
WBC URINE: 3 /HPF

## 2022-02-16 PROCEDURE — 84100 ASSAY OF PHOSPHORUS: CPT

## 2022-02-16 PROCEDURE — 258N000003 HC RX IP 258 OP 636: Performed by: SURGERY

## 2022-02-16 PROCEDURE — 85027 COMPLETE CBC AUTOMATED: CPT

## 2022-02-16 PROCEDURE — 83735 ASSAY OF MAGNESIUM: CPT

## 2022-02-16 PROCEDURE — 81001 URINALYSIS AUTO W/SCOPE: CPT

## 2022-02-16 PROCEDURE — 82310 ASSAY OF CALCIUM: CPT

## 2022-02-16 PROCEDURE — 36415 COLL VENOUS BLD VENIPUNCTURE: CPT

## 2022-02-16 PROCEDURE — 96360 HYDRATION IV INFUSION INIT: CPT

## 2022-02-16 RX ORDER — EPINEPHRINE 1 MG/ML
0.3 INJECTION, SOLUTION, CONCENTRATE INTRAVENOUS EVERY 5 MIN PRN
Status: CANCELLED | OUTPATIENT
Start: 2022-02-16

## 2022-02-16 RX ORDER — MEPERIDINE HYDROCHLORIDE 25 MG/ML
25 INJECTION INTRAMUSCULAR; INTRAVENOUS; SUBCUTANEOUS EVERY 30 MIN PRN
Status: CANCELLED | OUTPATIENT
Start: 2022-02-16

## 2022-02-16 RX ORDER — ALBUTEROL SULFATE 90 UG/1
1-2 AEROSOL, METERED RESPIRATORY (INHALATION)
Status: CANCELLED
Start: 2022-02-16

## 2022-02-16 RX ORDER — METHYLPREDNISOLONE SODIUM SUCCINATE 125 MG/2ML
125 INJECTION, POWDER, LYOPHILIZED, FOR SOLUTION INTRAMUSCULAR; INTRAVENOUS
Status: CANCELLED
Start: 2022-02-16

## 2022-02-16 RX ORDER — ALBUTEROL SULFATE 0.83 MG/ML
2.5 SOLUTION RESPIRATORY (INHALATION)
Status: CANCELLED | OUTPATIENT
Start: 2022-02-16

## 2022-02-16 RX ORDER — NALOXONE HYDROCHLORIDE 0.4 MG/ML
0.2 INJECTION, SOLUTION INTRAMUSCULAR; INTRAVENOUS; SUBCUTANEOUS
Status: CANCELLED | OUTPATIENT
Start: 2022-02-16

## 2022-02-16 RX ORDER — DIPHENHYDRAMINE HYDROCHLORIDE 50 MG/ML
50 INJECTION INTRAMUSCULAR; INTRAVENOUS
Status: CANCELLED
Start: 2022-02-16

## 2022-02-16 RX ORDER — EPINEPHRINE 1 MG/ML
0.3 INJECTION, SOLUTION INTRAMUSCULAR; SUBCUTANEOUS EVERY 5 MIN PRN
Status: CANCELLED | OUTPATIENT
Start: 2022-02-16

## 2022-02-16 RX ORDER — PROMETHAZINE HYDROCHLORIDE 12.5 MG/1
12.5 TABLET ORAL EVERY 6 HOURS PRN
Qty: 10 TABLET | Refills: 0 | Status: SHIPPED | OUTPATIENT
Start: 2022-02-16 | End: 2023-10-16

## 2022-02-16 RX ADMIN — SODIUM CHLORIDE 1000 ML: 9 INJECTION, SOLUTION INTRAVENOUS at 12:33

## 2022-02-16 NOTE — TELEPHONE ENCOUNTER
REINA reviewed with Dr. Hays- no change in plan.     Zora verbalized understanding and comfort with plan. Denied questions.

## 2022-02-16 NOTE — PATIENT INSTRUCTIONS
Dear Zora López    Thank you for choosing [facility] ([facility]) for your infusion.  The following information is a summary of our appointment as well as important reminders.      We look forward in seeing you on your next appointment here at [facility] ([facility]).  Please don t hesitate to call us at [phone] to reschedule any of your appointments or to speak with one of the [facility] registered nurses.  It was a pleasure taking care of you today.    Sincerely,    [facility]  [facility]  [address] [place] [address]  [address]  Phone:  [phone]

## 2022-02-16 NOTE — TELEPHONE ENCOUNTER
Pt seen in clinic yesterday and if not feeling better to call us today  Unable to keep anything down- fluids or food

## 2022-02-16 NOTE — TELEPHONE ENCOUNTER
Outreach call made to Zora. She reports that since yesterday at 3am she has had increased nausea and has been vomiting every 3-4 hours, not tolerating liquids or food. Has tried zofran with minimal relief. Normal BM 2/15/22. Abd not distended. No increased pain nor fevers. Denies dizziness or lightheadedness. Voiding without difficulty, urine appears more concentrated. She is concerned she is getting dehydrated due to very limited intake over the past 24 hours and dry mouth. Reports she had this level of nausea during pregnancy that was only relieved with IVF.     Above reviewed with Dr. King who said to check CBC, BMP, mag, and phos and give 1L NS IV. [facility] okay, no additional needed, appt scheduled for 12:00pm today. Zora updated of above, verbalized understanding and comfort with plan, confirmed she has a ride.

## 2022-02-16 NOTE — LETTER
2/16/2022         RE: Zora López  [address] [facility] [address]        Dear Colleague,    Thank you for referring your patient, Zora López, to the [facility]. Please see a copy of my visit note below.    Infusion Nursing Note:  Zora López presents today for IV fluids and labs.    Patient seen by provider today: No   present during visit today: Not Applicable.    Note: Patient reports she has had nausea since Sunday but it has worsened since yesterday evening. She is not keeping down any food or fluids since yesterday afternoon. Labs were collected per orders from transplant.  /85   Pulse 91   Temp 98.4  F (36.9  C) (Oral)   Resp 18   LMP 01/17/2022   SpO2 99%       Intravenous Access:  Peripheral IV placed.    Treatment Conditions:  Not Applicable.    Post Infusion Assessment:  Patient tolerated infusion without incident.  No evidence of extravasations.  Access discontinued per protocol.     Discharge Plan:   AVS to patient via MYCHART.  Patient will return as needed for next appointment.   Patient discharged in stable condition accompanied by: .  She continues to feel very nauseous. She received a call while in infusion from Caitlin with transplant. She will  oral compazine in the pharmacy at the [facility] and will try alternating this with her zofran.     Administrations This Visit     0.9% sodium chloride BOLUS     Admin Date  02/16/2022 Action  New Bag Dose  1,000 mL Route  Intravenous Administered By  Charo Rhodes RN Alyssa Marie Sakhitab-Kerestes, RN          Again, thank you for allowing me to participate in the care of your patient.      Sincerely,    [facility]

## 2022-02-16 NOTE — TELEPHONE ENCOUNTER
CBC and creatinine done today reviewed with Dr. Hays who advised adding UA and for Zora to try alternating phenergan 12.5mg po q 6 hours prn with zofran, spaced at least 2-4 hours apart. Rx sent to pharmacy.     Zora updated of above. SICP updated and to collect UA.

## 2022-02-16 NOTE — PROGRESS NOTES
Infusion Nursing Note:  Zora López presents today for IV fluids and labs.    Patient seen by provider today: No   present during visit today: Not Applicable.    Note: Patient reports she has had nausea since Sunday but it has worsened since yesterday evening. She is not keeping down any food or fluids since yesterday afternoon. Labs were collected per orders from transplant.  /85   Pulse 91   Temp 98.4  F (36.9  C) (Oral)   Resp 18   LMP 01/17/2022   SpO2 99%       Intravenous Access:  Peripheral IV placed.    Treatment Conditions:  Not Applicable.    Post Infusion Assessment:  Patient tolerated infusion without incident.  No evidence of extravasations.  Access discontinued per protocol.     Discharge Plan:   AVS to patient via MYCHART.  Patient will return as needed for next appointment.   Patient discharged in stable condition accompanied by: .  She continues to feel very nauseous. She received a call while in infusion from Caitlin with transplant. She will  oral compazine in the pharmacy at the [facility] and will try alternating this with her zofran.     Administrations This Visit     0.9% sodium chloride BOLUS     Admin Date  02/16/2022 Action  New Bag Dose  1,000 mL Route  Intravenous Administered By  Charo Rhodes RN Alyssa Marie Sakhitab-Kerestes, RN

## 2022-02-16 NOTE — TELEPHONE ENCOUNTER
Call from [facility]-  They have order for Phenergan orally and pt wants it IV  Needs a call back - [phone]

## 2022-02-17 ENCOUNTER — TELEPHONE (OUTPATIENT)
Dept: TRANSPLANT | Facility: CLINIC | Age: 47
End: 2022-02-17
Payer: COMMERCIAL

## 2022-02-17 DIAGNOSIS — Z52.4 KIDNEY DONOR: Primary | ICD-10-CM

## 2022-02-17 NOTE — TELEPHONE ENCOUNTER
Outreach call made to Zora to check in regarding nausea and vomiting.     Zora reports overall improvement. She vomited x1 last evening but not since. She is using the phenergan and zofran prn with some relief. Also tried pepto bismol with relief. Still feels mildly nauseated at times but tolerating small meals and liquids.     Voiding without difficulty urine appears slightly concentrated but otherwise normal. Last BM 1/15/22, passing gas, no abd distention or feeing constipated. Minimal back pain only, planning to try prn tylenol. Tolerating being up and about.     Zora knows to contact us if she doesn't continue to improve and is unable to stay hydrated or any other new or change in symptoms.

## 2022-02-17 NOTE — LETTER
Physician Order for Living Kidney Donor  Date and Time Issued: 2022 11:43 AM    Patient Name: Zora López Diagnosis: Living Organ Donor   : 1975 ICD-10 Code: [x] Z52.4   Claude MRN: [id number] Expires: 22     As discussed in your evaluation prior to donation, kidney donors are to have labs drawn at specific intervals. You are due for your labs 6 weeks after donation (~3/17/22). You may have these labs drawn at your primary care provider's lab, or call [phone] to make an appointment at the [facility] [place] lab. You are currently scheduled for these 3/22/22. You will be contacted with any abnormal results requiring follow up.     [x] Serum Creatinine      [x] Urinalysis     [x] Total protein random urine    [x] Microalbumin quantitative random urine    Please fax results to [phone]       For questions, please call [phone] ([phone])  Lab Billing Information   Dear Provider,     On behalf of the Solid Organ Transplant Office at the [facility], thank you for providing care to this patient, who is an organ donor. To ensure that our donors receive the necessary care, and in order for our program to gather required post donation information and test results, [facility] will pay for all donor related testing that is authorized and ordered by our transplant program.     The transplant program at [facility] will reimburse all charges at 100% of the Medicare Fee Schedule as defined in the Code of Federal Regulations (CFR) 42, Chapter IV. This is to be considered payment in full. [facility], the patient, and/or the patient's insurance are NOT to be billed any balance, co-payment, or deductible, per Medicare regulations.    To receive payment, please send claims to:  [facility]  Rev. Cycle - Transplant Finance/Donor Billing  [address] [place] [address] Samy Bryant MD  Surgical  Director, Kidney Transplantation

## 2022-02-21 ENCOUNTER — TELEPHONE (OUTPATIENT)
Dept: TRANSPLANT | Facility: CLINIC | Age: 47
End: 2022-02-21
Payer: COMMERCIAL

## 2022-02-21 NOTE — LETTER
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 Based on the progression of her recovery she will require 8 weeks of medical leave. The additional 2 weeks of leave is advised to allow for additional rest and recovery from post-operative side effects including ongoing discomfort and nausea. After 8 weeks (3/31/22), Zora may return to work without restriction.     Please call Caitlin Neely at [phone] with any question.     Sincerely,       Florence King MD FACS  Assistant Professor of Surgery  Director, Living Kidney Donor Program.

## 2022-02-21 NOTE — TELEPHONE ENCOUNTER
Post-hospital outreach call made to check in post kidney donation.     Zora reports overall feeling pretty good.    Incision:CDI, no redness, warmth, swelling or drainage   Pain: minimal, not requiring medications, back pain improved   Bowels: moving regularly, not feeling constipation    Appetite: improving, nausea significantly improved, now minimal   Fluids: taking adequate fluids, staying hydrated   Urinary: no issue, urine appears normal, light yellow  Activity: walking throughout each day, tolerating activity, using abd binder when out.     Reviewed: abdominal precautions, lifting restrictions.  Next appointment: 3/22/22  Reminded about UNOS follow-up requirements.     Zora knows how to get in touch with me or an on call coordinator with any questions or concerns.

## 2022-03-22 ENCOUNTER — OFFICE VISIT (OUTPATIENT)
Dept: TRANSPLANT | Facility: CLINIC | Age: 47
End: 2022-03-22
Attending: SURGERY

## 2022-03-22 ENCOUNTER — LAB (OUTPATIENT)
Dept: LAB | Facility: CLINIC | Age: 47
End: 2022-03-22
Attending: SURGERY

## 2022-03-22 VITALS
DIASTOLIC BLOOD PRESSURE: 79 MMHG | OXYGEN SATURATION: 96 % | HEART RATE: 81 BPM | SYSTOLIC BLOOD PRESSURE: 117 MMHG | WEIGHT: 215.3 LBS | BODY MASS INDEX: 33.22 KG/M2

## 2022-03-22 DIAGNOSIS — Z09 SURGERY FOLLOW-UP: Primary | ICD-10-CM

## 2022-03-22 DIAGNOSIS — Z52.4 DONOR OF KIDNEY FOR TRANSPLANT: ICD-10-CM

## 2022-03-22 LAB
ALBUMIN UR-MCNC: NEGATIVE MG/DL
APPEARANCE UR: CLEAR
BILIRUB UR QL STRIP: NEGATIVE
COLOR UR AUTO: COLORLESS
CREAT SERPL-MCNC: 1.25 MG/DL (ref 0.52–1.04)
CREAT UR-MCNC: 13 MG/DL
CREAT UR-MCNC: 13 MG/DL
GFR SERPL CREATININE-BSD FRML MDRD: 53 ML/MIN/1.73M2
GLUCOSE UR STRIP-MCNC: NEGATIVE MG/DL
HGB UR QL STRIP: NEGATIVE
KETONES UR STRIP-MCNC: NEGATIVE MG/DL
LEUKOCYTE ESTERASE UR QL STRIP: NEGATIVE
MICROALBUMIN UR-MCNC: <5 MG/L
MICROALBUMIN/CREAT UR: NORMAL MG/G{CREAT}
NITRATE UR QL: NEGATIVE
PH UR STRIP: 7 [PH] (ref 5–7)
PROT UR-MCNC: <0.05 G/L
PROT/CREAT 24H UR: NORMAL MG/G{CREAT}
RBC URINE: <1 /HPF
SP GR UR STRIP: 1 (ref 1–1.03)
UROBILINOGEN UR STRIP-MCNC: NORMAL MG/DL
WBC URINE: <1 /HPF

## 2022-03-22 PROCEDURE — 99024 POSTOP FOLLOW-UP VISIT: CPT | Performed by: SURGERY

## 2022-03-22 PROCEDURE — 82565 ASSAY OF CREATININE: CPT | Performed by: PATHOLOGY

## 2022-03-22 PROCEDURE — 84156 ASSAY OF PROTEIN URINE: CPT | Performed by: PATHOLOGY

## 2022-03-22 PROCEDURE — 36415 COLL VENOUS BLD VENIPUNCTURE: CPT | Performed by: PATHOLOGY

## 2022-03-22 PROCEDURE — 81001 URINALYSIS AUTO W/SCOPE: CPT | Performed by: PATHOLOGY

## 2022-03-22 PROCEDURE — 82043 UR ALBUMIN QUANTITATIVE: CPT | Performed by: PATHOLOGY

## 2022-03-22 ASSESSMENT — PAIN SCALES - GENERAL: PAINLEVEL: NO PAIN (0)

## 2022-03-22 NOTE — LETTER
3/22/2022         RE: Zora López  [address] [facility] [address]        Dear Colleague,    Thank you for referring your patient, Zora López, to the [facility]. Please see a copy of my visit note below.    Transplant Surgery Kidney Donor Progress Note    Medical record number: [id number]  YOB: 1975,   Date of Visit:  03/22/2022  For followup after kidney donation.    Assessment and Recommendations: Ms. López is doing well after kidney donation.     1. Lifting restrictions: 10 lbs until 8 weeks post donation.  2. Followup: commitment to healthy lifestyle and routine health exams as recommended for age and gender.   6 mo, 1 yr, and 2 yr followup per routine.  Today's labs were within normal limits for her postoperative course.  No protein in her urinalysis.  3. Return to work to be determined per patient's progress, between 6-8 weeks after surgery.  4.  Alcohol consumption: We discussed alcohol consumption in moderation and to avoid dehydration.    Total time: 15 minutes  Counselling time: 10 minutes      Florence King MD FACS  Assistant Professor of Surgery  Director, Living Kidney Donor Program.    ---------------------------------------------------------------------------------------------------    S: Ms. López donate a kidney 6 weeks ago and is doing well, reporting no fevers, chills, dysuria.  She is not taking narcotic analgesia.  She is back to partaking in routine activities of daily living. Post-donation concerns are: none.  Nausea resolved    Medications:  Prescription Medications as of 3/22/2022       Rx Number Disp Refills Start End Last Dispensed Date Next Fill Date Owning Pharmacy    acetaminophen (TYLENOL) 325 MG tablet  60 tablet 0 2/4/2022    [facility] - [place], [place] - [address] [place] [address]    Sig: Take 2 tablets (650 mg) by mouth every 6 hours    Class: E-Prescribe    Route: Oral     ondansetron (ZOFRAN-ODT) 4 MG ODT tab  20 tablet 0 2/15/2022    [address]    Sig: Take 1 tablet (4 mg) by mouth every 6 hours as needed for nausea or vomiting    Class: E-Prescribe    Route: Oral    oxyCODONE (ROXICODONE) 5 MG tablet  15 tablet 0 2/4/2022    [address]    Sig: Take 1-2 tablets (5-10 mg) by mouth every 4 hours as needed for moderate to severe pain    Class: E-Prescribe    Earliest Fill Date: 2/4/2022    Route: Oral    promethazine (PHENERGAN) 12.5 MG tablet  10 tablet 0 2/16/2022    [address]    Sig: Take 1 tablet (12.5 mg) by mouth every 6 hours as needed for nausea    Class: E-Prescribe    Route: Oral    senna-docusate (SENOKOT-S/PERICOLACE) 8.6-50 MG tablet  60 tablet 0 2/4/2022    [place], [place] - [address] [place] [address]    Sig: Take 1-2 tablets by mouth 2 times daily as needed for constipation    Class: E-Prescribe    Route: Oral          Exam:   Pulse:  [81] 81  BP: (117)/(79) 117/79  SpO2:  [96 %] 96 %  Appearance: in no apparent distress.   Skin: normal  Head and Neck: Normal, no rashes or jaundice  Respiratory: normal respiratory excursions, no audible wheeze  Cardiovascular: RRR  Abdomen: rounded and obese, No distention, Surgical scars consistent with history and incisions clean dry and intact  Extremeties: Edema, none  Neuro: without deficit       Labs:   Recent Labs   Lab Test 03/22/22  0916 02/16/22  1237   BUN  --  15   CR 1.25* 1.19*   GFRESTIMATED 53* 57*   GLC  --  100*     Recent Labs   Lab Test 03/22/22  0921   COLOR Colorless   APPEARANCE Clear   URINEGLC Negative   URINEBILI Negative   URINEKETONE Negative   SG 1.005   UBLD Negative   URINEPH 7.0   PROTEIN Negative   NITRITE Negative   LEUKEST Negative   RBCU <1   WBCU <1     Recent Labs   Lab  Test 04/09/21  0849   UTP 0.07

## 2022-03-22 NOTE — PROGRESS NOTES
Transplant Surgery Kidney Donor Progress Note    Medical record number: [id number]  YOB: 1975,   Date of Visit:  03/22/2022  For followup after kidney donation.    Assessment and Recommendations: Ms. López is doing well after kidney donation.     1. Lifting restrictions: 10 lbs until 8 weeks post donation.  2. Followup: commitment to healthy lifestyle and routine health exams as recommended for age and gender.   6 mo, 1 yr, and 2 yr followup per routine.  Today's labs were within normal limits for her postoperative course.  No protein in her urinalysis.  3. Return to work to be determined per patient's progress, between 6-8 weeks after surgery.  4.  Alcohol consumption: We discussed alcohol consumption in moderation and to avoid dehydration.    Total time: 15 minutes  Counselling time: 10 minutes      Florence King MD FACS  Assistant Professor of Surgery  Director, Living Kidney Donor Program.    ---------------------------------------------------------------------------------------------------    S: Ms. López donate a kidney 6 weeks ago and is doing well, reporting no fevers, chills, dysuria.  She is not taking narcotic analgesia.  She is back to partaking in routine activities of daily living. Post-donation concerns are: none.  Nausea resolved    Medications:  Prescription Medications as of 3/22/2022       Rx Number Disp Refills Start End Last Dispensed Date Next Fill Date Owning Pharmacy    acetaminophen (TYLENOL) 325 MG tablet  60 tablet 0 2/4/2022    [place], [place] - [address]    Sig: Take 2 tablets (650 mg) by mouth every 6 hours    Class: E-Prescribe    Route: Oral    ondansetron (ZOFRAN-ODT) 4 MG ODT tab  20 tablet 0 2/15/2022    [place], [place] - [address]    Sig: Take 1 tablet (4 mg) by mouth every 6 hours as needed for nausea or vomiting    Class: E-Prescribe    Route: Oral     oxyCODONE (ROXICODONE) 5 MG tablet  15 tablet 0 2/4/2022    [address]    Sig: Take 1-2 tablets (5-10 mg) by mouth every 4 hours as needed for moderate to severe pain    Class: E-Prescribe    Earliest Fill Date: 2/4/2022    Route: Oral    promethazine (PHENERGAN) 12.5 MG tablet  10 tablet 0 2/16/2022    [place], [place] - [address] [facility] [address]    Sig: Take 1 tablet (12.5 mg) by mouth every 6 hours as needed for nausea    Class: E-Prescribe    Route: Oral    senna-docusate (SENOKOT-S/PERICOLACE) 8.6-50 MG tablet  60 tablet 0 2/4/2022    [place], [place] - [address] [place] [address]    Sig: Take 1-2 tablets by mouth 2 times daily as needed for constipation    Class: E-Prescribe    Route: Oral          Exam:   Pulse:  [81] 81  BP: (117)/(79) 117/79  SpO2:  [96 %] 96 %  Appearance: in no apparent distress.   Skin: normal  Head and Neck: Normal, no rashes or jaundice  Respiratory: normal respiratory excursions, no audible wheeze  Cardiovascular: RRR  Abdomen: rounded and obese, No distention, Surgical scars consistent with history and incisions clean dry and intact  Extremeties: Edema, none  Neuro: without deficit       Labs:   Recent Labs   Lab Test 03/22/22  0916 02/16/22  1237   BUN  --  15   CR 1.25* 1.19*   GFRESTIMATED 53* 57*   GLC  --  100*     Recent Labs   Lab Test 03/22/22  0921   COLOR Colorless   APPEARANCE Clear   URINEGLC Negative   URINEBILI Negative   URINEKETONE Negative   SG 1.005   UBLD Negative   URINEPH 7.0   PROTEIN Negative   NITRITE Negative   LEUKEST Negative   RBCU <1   WBCU <1     Recent Labs   Lab Test 04/09/21  0849   UTPG 0.07

## 2022-04-18 ENCOUNTER — TELEPHONE (OUTPATIENT)
Dept: TRANSPLANT | Facility: CLINIC | Age: 47
End: 2022-04-18
Payer: COMMERCIAL

## 2022-04-18 NOTE — TELEPHONE ENCOUNTER
SW reached out to pt by phone today upon request of nurse coordinator. Zora was a living kidney donor for her father in 2/2022. She requested assistance in locating a MH provider to process some difficult emotions that she is experiencing related to donation. SW provided education and resources on how to find a MH therapist ([employer], [employer], etc). SW offered to assist as needed. Coordinator has inquired if this can be a covered expense related to her donation.     SW offered space to process what Zora is going through and provided active listening and validation. SW attempted to normalize some of her concerns and experiences, especially playing the complex role of living donor, caretaker, roommate, and daughter to her recipient. SW encouraged good self care and gentleness towards self during this process. SW provided brief psycho education on increase of MH/psychosocial concerns post living donation.     SW to continue to follow up with Zora and assist her as needed. She was agreeable to a follow up phone call in 1-2 weeks from RODRIGUEZ.     Daphney Estrada, [facility], CCTSW   Living Donor   [facility]  Direct: [phone]  E-Mail: [email or web address]

## 2022-04-18 NOTE — TELEPHONE ENCOUNTER
Zora called with a couple concerns. She is reporting that recently she has noticed she is having a small amount of urinary incontinence. She had similar after the birth of one of her children which resolved with pelvic floor therapy. She is wondering if this would supported and be covered under her donor plan.     Additionally, Zora reporting that she is struggling emotionally with her kidney donation such that it is impacting the relationship she has with her dad who was the recipient. Zora would like to engage in some counseling and wondering if that would fall under her donor coverage. She is open to a call with donor  Daphney Estrada as well.      Plan to review with Dr. King. Message sent to  to follow up with Zora. Message also sent regarding coverage.

## 2022-04-20 ENCOUNTER — TELEPHONE (OUTPATIENT)
Dept: TRANSPLANT | Facility: CLINIC | Age: 47
End: 2022-04-20
Payer: COMMERCIAL

## 2022-04-20 DIAGNOSIS — Z52.4 KIDNEY DONOR: Primary | ICD-10-CM

## 2022-04-20 RX ORDER — MEPERIDINE HYDROCHLORIDE 25 MG/ML
25 INJECTION INTRAMUSCULAR; INTRAVENOUS; SUBCUTANEOUS EVERY 30 MIN PRN
Status: CANCELLED | OUTPATIENT
Start: 2022-04-20

## 2022-04-20 RX ORDER — ALBUTEROL SULFATE 90 UG/1
1-2 AEROSOL, METERED RESPIRATORY (INHALATION)
Status: CANCELLED
Start: 2022-04-20

## 2022-04-20 RX ORDER — METHYLPREDNISOLONE SODIUM SUCCINATE 125 MG/2ML
125 INJECTION, POWDER, LYOPHILIZED, FOR SOLUTION INTRAMUSCULAR; INTRAVENOUS
Status: CANCELLED
Start: 2022-04-20

## 2022-04-20 RX ORDER — NALOXONE HYDROCHLORIDE 0.4 MG/ML
0.2 INJECTION, SOLUTION INTRAMUSCULAR; INTRAVENOUS; SUBCUTANEOUS
Status: CANCELLED | OUTPATIENT
Start: 2022-04-20

## 2022-04-20 RX ORDER — EPINEPHRINE 1 MG/ML
0.3 INJECTION, SOLUTION, CONCENTRATE INTRAVENOUS EVERY 5 MIN PRN
Status: CANCELLED | OUTPATIENT
Start: 2022-04-20

## 2022-04-20 RX ORDER — DIPHENHYDRAMINE HYDROCHLORIDE 50 MG/ML
50 INJECTION INTRAMUSCULAR; INTRAVENOUS
Status: CANCELLED
Start: 2022-04-20

## 2022-04-20 RX ORDER — ALBUTEROL SULFATE 0.83 MG/ML
2.5 SOLUTION RESPIRATORY (INHALATION)
Status: CANCELLED | OUTPATIENT
Start: 2022-04-20

## 2022-04-20 NOTE — TELEPHONE ENCOUNTER
RODRIGUEZ recommending Zora engage in therapy to work through some post donation emotions and feelings. Reviewed this with leadership who supports this being covered under her donor coverage.     Additionally, plan established for Zora to follow up with Dr. King regarding the urinary incontinence and request for pelvic floor therapy. Message sent for scheduling and Dr. King updated, will update Zora with any additional recommendations.     Outreach call made to Zora to review above, VM left.

## 2022-04-21 NOTE — TELEPHONE ENCOUNTER
Zora brooks. She verbalized understanding and comfort with this plan. She knows how to reach me with questions.

## 2022-04-25 NOTE — TELEPHONE ENCOUNTER
Dr. King advised Zora do some of the pelvic floor exercises she did post partum and get a UA. If symptoms resolve no need to follow up.     Zora updated of above. She verbalized understanding. She will get UA at her local [facility] Follow up in place, will cancel if symptoms improve.

## 2022-04-27 ENCOUNTER — TELEPHONE (OUTPATIENT)
Dept: TRANSPLANT | Facility: CLINIC | Age: 47
End: 2022-04-27
Payer: COMMERCIAL

## 2022-04-27 NOTE — TELEPHONE ENCOUNTER
SW called pt for check in. Left message with contact info.     Daphney Estrada LICSW, CCTSW   Living Donor   [facility], [facility], [facility]  Direct: [phone]  E-Mail: [email or web address]

## 2022-05-01 ENCOUNTER — HEALTH MAINTENANCE LETTER (OUTPATIENT)
Age: 47
End: 2022-05-01

## 2022-05-11 ENCOUNTER — LAB (OUTPATIENT)
Dept: LAB | Facility: CLINIC | Age: 47
End: 2022-05-11
Payer: COMMERCIAL

## 2022-05-11 DIAGNOSIS — Z52.4 KIDNEY DONOR: ICD-10-CM

## 2022-05-11 LAB
ALBUMIN UR-MCNC: NEGATIVE MG/DL
APPEARANCE UR: CLEAR
BILIRUB UR QL STRIP: NEGATIVE
COLOR UR AUTO: YELLOW
GLUCOSE UR STRIP-MCNC: NEGATIVE MG/DL
HGB UR QL STRIP: NEGATIVE
KETONES UR STRIP-MCNC: NEGATIVE MG/DL
LEUKOCYTE ESTERASE UR QL STRIP: NEGATIVE
NITRATE UR QL: NEGATIVE
PH UR STRIP: 7 [PH] (ref 5–7)
RBC #/AREA URNS AUTO: NORMAL /HPF
SP GR UR STRIP: 1.01 (ref 1–1.03)
UROBILINOGEN UR STRIP-ACNC: 0.2 E.U./DL
WBC #/AREA URNS AUTO: NORMAL /HPF

## 2022-05-11 PROCEDURE — 82565 ASSAY OF CREATININE: CPT

## 2022-05-11 PROCEDURE — 82043 UR ALBUMIN QUANTITATIVE: CPT

## 2022-05-11 PROCEDURE — 81001 URINALYSIS AUTO W/SCOPE: CPT

## 2022-05-11 PROCEDURE — 84156 ASSAY OF PROTEIN URINE: CPT

## 2022-05-11 PROCEDURE — 36415 COLL VENOUS BLD VENIPUNCTURE: CPT

## 2022-05-12 ENCOUNTER — TELEPHONE (OUTPATIENT)
Dept: TRANSPLANT | Facility: CLINIC | Age: 47
End: 2022-05-12

## 2022-05-12 LAB
CREAT SERPL-MCNC: 1.11 MG/DL (ref 0.52–1.04)
CREAT UR-MCNC: 12 MG/DL
CREAT UR-MCNC: 12 MG/DL
GFR SERPL CREATININE-BSD FRML MDRD: 61 ML/MIN/1.73M2
MICROALBUMIN UR-MCNC: <5 MG/L
MICROALBUMIN/CREAT UR: NORMAL MG/G{CREAT}
PROT UR-MCNC: <0.05 G/L
PROT/CREAT 24H UR: NORMAL MG/G{CREAT}

## 2022-05-12 NOTE — TELEPHONE ENCOUNTER
Call received from Zora reporting that she has identified a counselor and will begin counseling related to her post donation feelings and their impact on her relationship with her recipient. I will send her the donor billing document to provide the counselor.     Additionally, Zora has noted some improvement in her urinary incontinence. She reports having reached out to her prior pelvic floor therapist who felt incontinence could be related to her recovery from kidney donation. Zora plans to follow up with Dr. Madera to review and further assess. UA done.

## 2022-05-12 NOTE — LETTER
REIMBURSEMENT INFORMATION FOR LIVING ORGAN DONORS    LIVING ORGAN DONOR: This form MUST accompany & remain attached to Orders &  given to Provider and/or Healthcare Facility Business Office    PROVIDER/FACILITY INSTRUCTIONS: By accepting to perform these services for living organ  donation, the provider/facility agrees to exclusively bill the [facility] instead of billing  the patient or any insurance provider and agrees to accept the reimbursement, as described below, as  payment in full for services rendered.    PROVIDER BILLING INSTRUCTIONS:  1. [facility] agrees to pay for all authorized testing ordered by our transplant  program that is related to living organ donation. The attached orders/tests are part of the donor  Evaluation.    2. Do not bill the donor or donor's insurance. Send an itemized invoice, claim or statement to:    [facility]  Transplant Finance/Donor Billing  [address] [place] [address], [place] [address]    3. Billing statements must include the patient first and last name, date of birth, the CPT procedure code  and date of service. Please bill service on the ORIGINAL UBO4 or 1500 with appropriate CPT/HCPCS  codes along with W-9 and send to the above address to insure timely reimbursement.    4. Claims should be submitted no later than six months from the date when services are rendered.  Claims denied for late submission should not be billed to the donor or their private insurance carrier.    5. [facility] will reimburse all charges at 100% of the Medicare Fee Schedule as  defined in the Code of Federal Regulations (CFR) 42, Chapter IV. This is to be considered payment  in full. [facility], the patient, and/or the patient's insurance are NOT to  be billed any balance, co-payment, or deductible, per Medicare regulations. **ATTN: Facility  providing services for attached/enclosed  Living Donor Orders; If facility does NOT AGREE to  the reimbursement rate stated above, PLEASE DENY SERVICES & refer Donor/patient back to  their [facility].    6. Patients are NOT to make any payments at the time of service.    Please forward this information to your billing department so that a donor account can be set up with  these instructions.    Should you have any questions, please contact the Donor Billing office at [phone],  Monday - Friday, 8:00 a.m. to 4:00 p.m.   Thank you for your assistance.

## 2022-05-24 ENCOUNTER — OFFICE VISIT (OUTPATIENT)
Dept: TRANSPLANT | Facility: CLINIC | Age: 47
End: 2022-05-24
Attending: SURGERY

## 2022-05-24 VITALS
DIASTOLIC BLOOD PRESSURE: 85 MMHG | HEART RATE: 82 BPM | OXYGEN SATURATION: 98 % | BODY MASS INDEX: 34.72 KG/M2 | TEMPERATURE: 97.4 F | HEIGHT: 66 IN | RESPIRATION RATE: 15 BRPM | SYSTOLIC BLOOD PRESSURE: 121 MMHG | WEIGHT: 216 LBS

## 2022-05-24 DIAGNOSIS — Z52.4 KIDNEY DONOR: Primary | ICD-10-CM

## 2022-05-24 PROCEDURE — G0463 HOSPITAL OUTPT CLINIC VISIT: HCPCS

## 2022-05-24 PROCEDURE — 99213 OFFICE O/P EST LOW 20 MIN: CPT | Performed by: SURGERY

## 2022-05-24 ASSESSMENT — PAIN SCALES - GENERAL: PAINLEVEL: NO PAIN (0)

## 2022-05-24 NOTE — NURSING NOTE
"Chief Complaint   Patient presents with     Follow Up     Follow Up - Kidney Donor Post Op     /85   Pulse 82   Temp 97.4  F (36.3  C)   Resp 15   Ht 1.68 m (5' 6.14\")   Wt 98 kg (216 lb)   SpO2 98%   BMI 34.71 kg/m    Joshua Carmen on 5/24/2022 at 9:35 AM    "

## 2022-05-24 NOTE — PROGRESS NOTES
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    Assessment and Recommendations: Ms. López is doing well after kidney donation.     1. Pelvic floor weakness: 1 month pelvic floor therapy  2. Followup: commitment to healthy lifestyle and routine health exams as recommended for age and gender.   6 mo, 1 yr, and 2 yr followup per routine.     Total time: 15 minutes  Counselling time: 10 minutes      Florence King MD FACS  Assistant Professor of Surgery  Director, Living Kidney Donor Program.    ---------------------------------------------------------------------------------------------------    S: Ms. López donate a kidney 4 months ago and is doing well, reporting no fevers, chills, dysuria.  She is not taking narcotic analgesia.  She is back to partaking in routine activities of daily living. Post-donation concerns are: urine frequency and leakage.  She has had pelvic floor issues in the past and has done pelvic floor PT.  Current symptoms include:  Urine leakage  Can't hold her urine as long  Just started since going back to work  Wants to do pelvic floor therapy  Went back to work 4/4  Still having extreme fatigue  Father going to switch insurance and donor concerned about getting her post donro care coverage      Medications:  Prescription Medications as of 6/9/2022       Rx Number Disp Refills Start End Last Dispensed Date Next Fill Date Owning Pharmacy    acetaminophen (TYLENOL) 325 MG tablet  60 tablet 0 2/4/2022    [place], [place] - [address] [facility]    Sig: Take 2 tablets (650 mg) by mouth every 6 hours    Class: E-Prescribe    Route: Oral    ondansetron (ZOFRAN-ODT) 4 MG ODT tab  20 tablet 0 2/15/2022    [place], [place] - [address] [facility] [address]    Sig: Take 1 tablet (4 mg) by mouth every 6  hours as needed for nausea or vomiting    Class: E-Prescribe    Route: Oral    oxyCODONE (ROXICODONE) 5 MG tablet  15 tablet 0 2/4/2022    [address]    Sig: Take 1-2 tablets (5-10 mg) by mouth every 4 hours as needed for moderate to severe pain    Class: E-Prescribe    Earliest Fill Date: 2/4/2022    Route: Oral    promethazine (PHENERGAN) 12.5 MG tablet  10 tablet 0 2/16/2022    [facility] [address] [facility] [address]    Sig: Take 1 tablet (12.5 mg) by mouth every 6 hours as needed for nausea    Class: E-Prescribe    Route: Oral    senna-docusate (SENOKOT-S/PERICOLACE) 8.6-50 MG tablet  60 tablet 0 2/4/2022    [place], [place] - [address] [place] [address]    Sig: Take 1-2 tablets by mouth 2 times daily as needed for constipation    Class: E-Prescribe    Route: Oral          Exam:      Appearance: in no apparent distress.   Skin: normal  Head and Neck: Normal, no rashes or jaundice  Respiratory: normal respiratory excursions, no audible wheeze  Cardiovascular: RRR  Abdomen: flat, No distention and Surgical scars consistent with history   Extremeties: Edema, none  Neuro: without deficit       Labs:   Recent Labs   Lab Test 05/11/22  1530 03/22/22  0916 02/16/22  1237   BUN  --   --  15   CR 1.11*   < > 1.19*   GFRESTIMATED 61   < > 57*   GLC  --   --  100*    < > = values in this interval not displayed.     Recent Labs   Lab Test 05/11/22  1530   COLOR Yellow   APPEARANCE Clear   URINEGLC Negative   URINEBILI Negative   URINEKETONE Negative   SG 1.010   UBLD Negative   URINEPH 7.0   PROTEIN Negative   NITRITE Negative   LEUKEST Negative   RBCU None Seen   WBCU None Seen     Recent Labs   Lab Test 04/09/21  0849   [facility] 0.07

## 2022-06-02 ENCOUNTER — TELEPHONE (OUTPATIENT)
Dept: TRANSPLANT | Facility: CLINIC | Age: 47
End: 2022-06-02

## 2022-06-02 NOTE — TELEPHONE ENCOUNTER
Dania Woody requesting order for pelvic floor therapy okayed by Dr. King during her last office visit.     Order letter with billing instructions faxed.

## 2022-06-02 NOTE — LETTER
PHYSICIAN ORDERS      DATE & TIME ISSUED: 2022 3:30 PM  PATIENT NAME: Zora López   : 1975     [facility] MR# [if applicable]: [id number]     DIAGNOSIS:  kidney donor  ICD-10 CODE: z00.5     Pelvic floor therapy, evaluate and treat x1 month    Any questions please call: Caitlin Neely at [phone]    Fax progress to:  [phone].       Florence King MD FACS  Assistant Professor of Surgery  Director, Living Kidney Donor Program.       [Alert] : alert [No Acute Distress] : no acute distress [Normal Sclera/Conjunctiva] : normal sclera/conjunctiva [No Proptosis] : no proptosis [Normal Outer Ear/Nose] : the ears and nose were normal in appearance [Normal Hearing] : hearing was normal [No Neck Mass] : no neck mass was observed [No Respiratory Distress] : no respiratory distress [Clear to Auscultation] : lungs were clear to auscultation bilaterally [Normal S1, S2] : normal S1 and S2 [Normal Rate] : heart rate was normal [Not Tender] : non-tender [Soft] : abdomen soft [Normal Gait] : normal gait [No Rash] : no rash [Right foot was examined, including] : right foot ~C was examined, including visual inspection with sensory and pulse exams [Left foot was examined, including] : left foot ~C was examined, including visual inspection with sensory and pulse exams [Normal] : normal [2+] : 2+ in the dorsalis pedis [No Tremors] : no tremors [Oriented x3] : oriented to person, place, and time [Normal Affect] : the affect was normal [Normal Mood] : the mood was normal [Vibration Dec.] : normal vibratory sensation at the level of the toes [Position Sense Dec.] : normal position sense at the level of the toes [Diminished Throughout Both Feet] : normal tactile sensation with monofilament testing throughout both feet

## 2022-07-21 ENCOUNTER — TRANSFERRED RECORDS (OUTPATIENT)
Dept: HEALTH INFORMATION MANAGEMENT | Facility: CLINIC | Age: 47
End: 2022-07-21

## 2022-08-01 ENCOUNTER — DOCUMENTATION ONLY (OUTPATIENT)
Dept: TRANSPLANT | Facility: CLINIC | Age: 47
End: 2022-08-01

## 2022-08-01 ENCOUNTER — TELEPHONE (OUTPATIENT)
Dept: TRANSPLANT | Facility: CLINIC | Age: 47
End: 2022-08-01

## 2022-08-01 DIAGNOSIS — Z52.4 KIDNEY DONOR: Primary | ICD-10-CM

## 2022-08-11 ENCOUNTER — LAB (OUTPATIENT)
Dept: LAB | Facility: CLINIC | Age: 47
End: 2022-08-11
Payer: COMMERCIAL

## 2022-08-11 DIAGNOSIS — Z52.4 KIDNEY DONOR: ICD-10-CM

## 2022-08-11 LAB
ALBUMIN MFR UR ELPH: <6 MG/DL
ALBUMIN UR-MCNC: NEGATIVE MG/DL
APPEARANCE UR: CLEAR
BILIRUB UR QL STRIP: NEGATIVE
COLOR UR AUTO: YELLOW
CREAT UR-MCNC: 14.8 MG/DL
GLUCOSE UR STRIP-MCNC: NEGATIVE MG/DL
HGB UR QL STRIP: NEGATIVE
KETONES UR STRIP-MCNC: NEGATIVE MG/DL
LEUKOCYTE ESTERASE UR QL STRIP: NEGATIVE
NITRATE UR QL: NEGATIVE
PH UR STRIP: 6 [PH] (ref 5–7)
PROT/CREAT 24H UR: NORMAL MG/G{CREAT}
SP GR UR STRIP: <=1.005 (ref 1–1.03)
UROBILINOGEN UR STRIP-ACNC: 0.2 E.U./DL

## 2022-08-11 PROCEDURE — 84156 ASSAY OF PROTEIN URINE: CPT

## 2022-08-11 PROCEDURE — 82565 ASSAY OF CREATININE: CPT

## 2022-08-11 PROCEDURE — 82043 UR ALBUMIN QUANTITATIVE: CPT

## 2022-08-11 PROCEDURE — 81003 URINALYSIS AUTO W/O SCOPE: CPT

## 2022-08-11 PROCEDURE — 36415 COLL VENOUS BLD VENIPUNCTURE: CPT

## 2022-08-12 LAB
CREAT SERPL-MCNC: 1.16 MG/DL (ref 0.52–1.04)
CREAT UR-MCNC: 14 MG/DL
GFR SERPL CREATININE-BSD FRML MDRD: 58 ML/MIN/1.73M2
MICROALBUMIN UR-MCNC: 7 MG/L
MICROALBUMIN/CREAT UR: 50 MG/G CR (ref 0–25)

## 2022-08-15 ENCOUNTER — TELEPHONE (OUTPATIENT)
Dept: TRANSPLANT | Facility: CLINIC | Age: 47
End: 2022-08-15

## 2022-08-15 DIAGNOSIS — Z52.4 KIDNEY DONOR: Primary | ICD-10-CM

## 2022-08-15 NOTE — TELEPHONE ENCOUNTER
Post donation labs reviewed with Zora. Urine albumin elevated. She denies any change in health, but has been working out regularly including multiple times per week with a .     Plan established for her to repeat urine albumin in a few weeks after abstaining from exercise for a few days.

## 2022-08-21 ENCOUNTER — HEALTH MAINTENANCE LETTER (OUTPATIENT)
Age: 47
End: 2022-08-21

## 2022-10-24 ENCOUNTER — TELEPHONE (OUTPATIENT)
Dept: TRANSPLANT | Facility: CLINIC | Age: 47
End: 2022-10-24

## 2022-10-24 NOTE — TELEPHONE ENCOUNTER
Dania Woody called to report recent COVID infection, she is recovering well, but wanted to confirm no additional recommendations since she is a kidney donor. Reviewed importance of hydration and ongoing avoidance of NSAIDS. She verbalized understanding. Also reported some abd discomfort that she attributes to constipation, she plans to follow up with PCP if ongoing or unable to resolve using OTC strategies for constipation.     Reviewed recommended repeat of urine albumin. She confirmed plan to complete this testing in the next couple of weeks.

## 2022-11-21 ENCOUNTER — HEALTH MAINTENANCE LETTER (OUTPATIENT)
Age: 47
End: 2022-11-21

## 2023-02-03 ENCOUNTER — DOCUMENTATION ONLY (OUTPATIENT)
Dept: TRANSPLANT | Facility: CLINIC | Age: 48
End: 2023-02-03
Payer: COMMERCIAL

## 2023-02-03 DIAGNOSIS — Z52.4 KIDNEY DONOR: Primary | ICD-10-CM

## 2023-03-18 ENCOUNTER — LAB (OUTPATIENT)
Dept: LAB | Facility: CLINIC | Age: 48
End: 2023-03-18
Payer: COMMERCIAL

## 2023-03-18 DIAGNOSIS — Z52.4 KIDNEY DONOR: ICD-10-CM

## 2023-03-18 LAB
ALBUMIN UR-MCNC: NEGATIVE MG/DL
APPEARANCE UR: CLEAR
BACTERIA #/AREA URNS HPF: ABNORMAL /HPF
BILIRUB UR QL STRIP: NEGATIVE
COLOR UR AUTO: YELLOW
GLUCOSE UR STRIP-MCNC: NEGATIVE MG/DL
HGB UR QL STRIP: ABNORMAL
KETONES UR STRIP-MCNC: NEGATIVE MG/DL
LEUKOCYTE ESTERASE UR QL STRIP: NEGATIVE
NITRATE UR QL: NEGATIVE
PH UR STRIP: 6 [PH] (ref 5–7)
RBC #/AREA URNS AUTO: ABNORMAL /HPF
SP GR UR STRIP: <=1.005 (ref 1–1.03)
SQUAMOUS #/AREA URNS AUTO: ABNORMAL /LPF
UROBILINOGEN UR STRIP-ACNC: 0.2 E.U./DL
WBC #/AREA URNS AUTO: ABNORMAL /HPF

## 2023-03-18 PROCEDURE — 36415 COLL VENOUS BLD VENIPUNCTURE: CPT

## 2023-03-18 PROCEDURE — 82043 UR ALBUMIN QUANTITATIVE: CPT

## 2023-03-18 PROCEDURE — 84156 ASSAY OF PROTEIN URINE: CPT

## 2023-03-18 PROCEDURE — 82570 ASSAY OF URINE CREATININE: CPT

## 2023-03-18 PROCEDURE — 81001 URINALYSIS AUTO W/SCOPE: CPT

## 2023-03-18 PROCEDURE — 82565 ASSAY OF CREATININE: CPT

## 2023-03-19 LAB
ALBUMIN MFR UR ELPH: <6 MG/DL (ref 1–14)
CREAT SERPL-MCNC: 1.18 MG/DL (ref 0.51–0.95)
CREAT UR-MCNC: 21.5 MG/DL
CREAT UR-MCNC: 21.5 MG/DL
GFR SERPL CREATININE-BSD FRML MDRD: 57 ML/MIN/1.73M2
MICROALBUMIN UR-MCNC: <12 MG/L
MICROALBUMIN/CREAT UR: NORMAL MG/G{CREAT}
PROT/CREAT 24H UR: NORMAL MG/G{CREAT}

## 2023-03-20 ENCOUNTER — TELEPHONE (OUTPATIENT)
Dept: TRANSPLANT | Facility: CLINIC | Age: 48
End: 2023-03-20
Payer: COMMERCIAL

## 2023-03-20 DIAGNOSIS — Z52.4 KIDNEY DONOR: Primary | ICD-10-CM

## 2023-03-20 NOTE — TELEPHONE ENCOUNTER
Post donation labs reviewed. UA with RBC noted.      Call made to Zora who reported she had an IUD placed and has been intermittently spotting.     Plan established for her to repeat UA when she isn't spotting. Order placed.

## 2023-06-02 ENCOUNTER — HEALTH MAINTENANCE LETTER (OUTPATIENT)
Age: 48
End: 2023-06-02

## 2023-08-08 ENCOUNTER — TELEPHONE (OUTPATIENT)
Dept: TRANSPLANT | Facility: CLINIC | Age: 48
End: 2023-08-08
Payer: COMMERCIAL

## 2023-08-08 DIAGNOSIS — Z52.4 KIDNEY DONOR: Primary | ICD-10-CM

## 2023-08-08 NOTE — TELEPHONE ENCOUNTER
Zora called to report that in June she noticed that when she looked down at her abd she could see a slight protrusion on right side of her incision. Over the past couple of months the protrusion has increased in size. When she pushes on the area it feels more soft than the other side of her abd. No change in bowels and no abd pain.   Plan established to schedule follow up visit with Dr. King to assess for possible hernia. Message sent for scheduling. Zora will follow 10lb lifting restriction until evaluated. Zora knows to call with any abd pain or changes.

## 2023-08-11 NOTE — TELEPHONE ENCOUNTER
Message sent to Dr. Madera updating her of situation and plan. Will update Zora with any additional recommendations.

## 2023-08-14 ENCOUNTER — TELEPHONE (OUTPATIENT)
Dept: TRANSPLANT | Facility: CLINIC | Age: 48
End: 2023-08-14
Payer: COMMERCIAL

## 2023-08-14 NOTE — TELEPHONE ENCOUNTER
Call received from Zora to report that she noticed a change in her bowels over the weekend. She noticed increased frequency and different shape of her BMs. No pain or issues and having BMs daily. Plan established for her to review this with Dr. King at scheduled visit.     Additionally, Zora reported bilateral abd side muscle soreness that is disproportionate to any physical activity that developed over the weekend. She denies abd pain. Advised her to monitor and if worsens or does not improve with rest or if she develops any abd pain she should be evaluated in urgent care or ED. She verbalized understanding.     Message also sent to Maggie Bonilla NP updating her of above. Will update Zora with any additional recommendations.

## 2023-08-22 ENCOUNTER — OFFICE VISIT (OUTPATIENT)
Dept: TRANSPLANT | Facility: CLINIC | Age: 48
End: 2023-08-22
Attending: SURGERY

## 2023-08-22 VITALS
RESPIRATION RATE: 16 BRPM | HEART RATE: 84 BPM | SYSTOLIC BLOOD PRESSURE: 121 MMHG | OXYGEN SATURATION: 97 % | BODY MASS INDEX: 35.58 KG/M2 | WEIGHT: 227.2 LBS | TEMPERATURE: 98.4 F | DIASTOLIC BLOOD PRESSURE: 85 MMHG

## 2023-08-22 DIAGNOSIS — K43.2 INCISIONAL HERNIA, WITHOUT OBSTRUCTION OR GANGRENE: Primary | ICD-10-CM

## 2023-08-22 DIAGNOSIS — Z52.4 KIDNEY DONOR: ICD-10-CM

## 2023-08-22 LAB
ALBUMIN UR-MCNC: NEGATIVE MG/DL
APPEARANCE UR: CLEAR
BILIRUB UR QL STRIP: NEGATIVE
COLOR UR AUTO: NORMAL
GLUCOSE UR STRIP-MCNC: NEGATIVE MG/DL
HGB UR QL STRIP: NEGATIVE
KETONES UR STRIP-MCNC: NEGATIVE MG/DL
LEUKOCYTE ESTERASE UR QL STRIP: NEGATIVE
NITRATE UR QL: NEGATIVE
PH UR STRIP: 6.5 [PH] (ref 5–7)
SP GR UR STRIP: 1 (ref 1–1.03)
UROBILINOGEN UR STRIP-MCNC: NORMAL MG/DL

## 2023-08-22 PROCEDURE — 99214 OFFICE O/P EST MOD 30 MIN: CPT | Performed by: SURGERY

## 2023-08-22 PROCEDURE — 99213 OFFICE O/P EST LOW 20 MIN: CPT | Performed by: SURGERY

## 2023-08-22 PROCEDURE — 81003 URINALYSIS AUTO W/O SCOPE: CPT | Performed by: SURGERY

## 2023-08-22 RX ORDER — VENLAFAXINE HYDROCHLORIDE 37.5 MG/1
37.5 CAPSULE, EXTENDED RELEASE ORAL DAILY
COMMUNITY
Start: 2023-06-12

## 2023-08-22 ASSESSMENT — PAIN SCALES - GENERAL: PAINLEVEL: NO PAIN (0)

## 2023-08-22 NOTE — PROGRESS NOTES
[facility] Initial Transplant Surgery Consult    Zora López MRN# [id number]   Age: 48 year old YOB: 1975     Date of Admission:  (Not on file)    Reason for consult: Possible incisional hernia after kidney donation       Requesting physician: self       Level of consult: Consult, place orders and assume complete care of the patient           Assessment and Plan:   Assessment:   Possible hernia of midline hand port      Plan:   Discussed repair via lap vs open and the risks and benefits of each. She will think about lap vs open and will let us know which one she would like to proceed with and when. Full risks and benefits of surgery discussed and signs and symptoms of incarceration and strangulation discussed             Chief Complaint:   Incisional hernia     History is obtained from the patient    Noticed change in stomach in May and now appreciates a bulge in the midline incision.  Soft  BM's ok.  UA today to check for blood. Not on menses today. Recheck due to blood in the last scheduled recheck having blood and being on menses at the time of the sample           Past Medical History:     Past Medical History:   Diagnosis Date     Miscarriage     x2 in the first trimester     Plantar fasciitis              Past Surgical History:     Past Surgical History:   Procedure Laterality Date     APPENDECTOMY       LAPAROSCOPIC DONOR HAND ASSISTED KIDNEY LIVING RELATED N/A 2/3/2022    Procedure: Laparoscopic Hand Assisted Living Related Kidney Donor;  Surgeon: Florence King MD;  Location:  OR             Social History:     Social History     Tobacco Use     Smoking status: Never     Smokeless tobacco: Never   Substance Use Topics     Alcohol use: Yes     Alcohol/week: 3.0 - 4.0 standard drinks of alcohol     Types: 3 - 4 Standard drinks or equivalent per week     Comment: rarely             Family History:     Family History   Problem Relation Age of Onset     Melanoma Mother       Diabetes Father      Hypertension Father      Kidney Disease Father      Diabetes Brother      Myocardial Infarction Paternal Grandfather      Leukemia Maternal Grandmother      Family history reviewed         Allergies:   No Known Allergies          Medications:     Current Outpatient Medications   Medication Sig     venlafaxine (EFFEXOR XR) 37.5 MG 24 hr capsule Take 37.5 mg by mouth daily     acetaminophen (TYLENOL) 325 MG tablet Take 2 tablets (650 mg) by mouth every 6 hours (Patient not taking: Reported on 3/22/2022)     ondansetron (ZOFRAN-ODT) 4 MG ODT tab Take 1 tablet (4 mg) by mouth every 6 hours as needed for nausea or vomiting (Patient not taking: Reported on 3/22/2022)     oxyCODONE (ROXICODONE) 5 MG tablet Take 1-2 tablets (5-10 mg) by mouth every 4 hours as needed for moderate to severe pain (Patient not taking: Reported on 2/15/2022)     promethazine (PHENERGAN) 12.5 MG tablet Take 1 tablet (12.5 mg) by mouth every 6 hours as needed for nausea (Patient not taking: Reported on 3/22/2022)     senna-docusate (SENOKOT-S/PERICOLACE) 8.6-50 MG tablet Take 1-2 tablets by mouth 2 times daily as needed for constipation (Patient not taking: Reported on 3/22/2022)     No current facility-administered medications for this visit.           Immunosuppressant Medications:            Reasons for Consult:   Incisional hernia         Organs Lab:   BMP, Mag, Phos, CBC Diff and HgbA1C         Review of Systems:   The Review of Systems is negative other than noted in the HPI          Physical Exam:   Vitals were reviewed  All vitals stable  Constitutional:   awake, alert, cooperative, no apparent distress and appears stated age     Eyes:   sclera clear     Back:   no curvature     Lungs:   no increased work of breathing     Cardiovascular:   NSR     Abdomen:   scars noted left lower quadrant and midline above umbilicus, non-distended, non-tender and hernia present at midline incision     Genitounirinary:   No CVA  tenderness     Musculoskeletal:   no lower extremity pitting edema present     Neurologic:   Mental Status Exam:  Level of Alertness:   awake  Orientation:   person, place, time  Attention/Concentration:  normal  Coordination:  Gait:  Casual:  normal     Neuropsychiatric:   Level of consciousness: alert / normal  Affect: normal  Orientation: oriented to self, place, time and situation     Skin:   no bruising or bleeding and no rashes             Data:   All laboratory data reviewed    All imaging studies reviewed by me.

## 2023-08-22 NOTE — NURSING NOTE
Chief Complaint   Patient presents with    Follow Up     Kidney donor DOS 2/3/2022. Possible hernia      /85 (BP Location: Left arm, Patient Position: Sitting, Cuff Size: Adult Large)   Pulse 84   Temp 98.4  F (36.9  C) (Oral)   Resp 16   Wt 103.1 kg (227 lb 3.2 oz)   SpO2 97%   BMI 35.58 kg/m      ROD KENT, RN on 8/22/2023 at 11:31 AM

## 2023-08-22 NOTE — LETTER
8/22/2023         RE: Zora López  [address]  [place] [address]        Dear Colleague,    Thank you for referring your patient, Zora López, to the [facility]. Please see a copy of my visit note below.    [facility] Initial Transplant Surgery Consult    Zora López MRN# [id number]   Age: 48 year old YOB: 1975     Date of Admission:  (Not on file)    Reason for consult: Possible incisional hernia after kidney donation       Requesting physician: self       Level of consult: Consult, place orders and assume complete care of the patient           Assessment and Plan:   Assessment:   Possible hernia of midline hand port      Plan:   Discussed repair via lap vs open and the risks and benefits of each. She will think about lap vs open and will let us know which one she would like to proceed with and when. Full risks and benefits of surgery discussed and signs and symptoms of incarceration and strangulation discussed             Chief Complaint:   Incisional hernia     History is obtained from the patient    Noticed change in stomach in May and now appreciates a bulge in the midline incision.  Soft  BM's ok.  UA today to check for blood. Not on menses today. Recheck due to blood in the last scheduled recheck having blood and being on menses at the time of the sample           Past Medical History:     Past Medical History:   Diagnosis Date    Miscarriage     x2 in the first trimester    Plantar fasciitis              Past Surgical History:     Past Surgical History:   Procedure Laterality Date    APPENDECTOMY      LAPAROSCOPIC DONOR HAND ASSISTED KIDNEY LIVING RELATED N/A 2/3/2022    Procedure: Laparoscopic Hand Assisted Living Related Kidney Donor;  Surgeon: Florence King MD;  Location:  OR             Social History:     Social History     Tobacco Use    Smoking status: Never    Smokeless tobacco: Never   Substance Use  Topics    Alcohol use: Yes     Alcohol/week: 3.0 - 4.0 standard drinks of alcohol     Types: 3 - 4 Standard drinks or equivalent per week     Comment: rarely             Family History:     Family History   Problem Relation Age of Onset    Melanoma Mother     Diabetes Father     Hypertension Father     Kidney Disease Father     Diabetes Brother     Myocardial Infarction Paternal Grandfather     Leukemia Maternal Grandmother      Family history reviewed         Allergies:   No Known Allergies          Medications:     Current Outpatient Medications   Medication Sig    venlafaxine (EFFEXOR XR) 37.5 MG 24 hr capsule Take 37.5 mg by mouth daily    acetaminophen (TYLENOL) 325 MG tablet Take 2 tablets (650 mg) by mouth every 6 hours (Patient not taking: Reported on 3/22/2022)    ondansetron (ZOFRAN-ODT) 4 MG ODT tab Take 1 tablet (4 mg) by mouth every 6 hours as needed for nausea or vomiting (Patient not taking: Reported on 3/22/2022)    oxyCODONE (ROXICODONE) 5 MG tablet Take 1-2 tablets (5-10 mg) by mouth every 4 hours as needed for moderate to severe pain (Patient not taking: Reported on 2/15/2022)    promethazine (PHENERGAN) 12.5 MG tablet Take 1 tablet (12.5 mg) by mouth every 6 hours as needed for nausea (Patient not taking: Reported on 3/22/2022)    senna-docusate (SENOKOT-S/PERICOLACE) 8.6-50 MG tablet Take 1-2 tablets by mouth 2 times daily as needed for constipation (Patient not taking: Reported on 3/22/2022)     No current facility-administered medications for this visit.           Immunosuppressant Medications:            Reasons for Consult:   Incisional hernia         Organs Lab:   BMP, Mag, Phos, CBC Diff and HgbA1C         Review of Systems:   The Review of Systems is negative other than noted in the HPI          Physical Exam:   Vitals were reviewed  All vitals stable  Constitutional:   awake, alert, cooperative, no apparent distress and appears stated age     Eyes:   sclera clear     Back:   no curvature      Lungs:   no increased work of breathing     Cardiovascular:   NSR     Abdomen:   scars noted left lower quadrant and midline above umbilicus, non-distended, non-tender and hernia present at midline incision     Genitounirinary:   No CVA tenderness     Musculoskeletal:   no lower extremity pitting edema present     Neurologic:   Mental Status Exam:  Level of Alertness:   awake  Orientation:   person, place, time  Attention/Concentration:  normal  Coordination:  Gait:  Casual:  normal     Neuropsychiatric:   Level of consciousness: alert / normal  Affect: normal  Orientation: oriented to self, place, time and situation     Skin:   no bruising or bleeding and no rashes             Data:   All laboratory data reviewed    All imaging studies reviewed by me.     Again, thank you for allowing me to participate in the care of your patient.      Sincerely,    Florence King MD, MD

## 2023-08-24 ENCOUNTER — DOCUMENTATION ONLY (OUTPATIENT)
Dept: TRANSPLANT | Facility: CLINIC | Age: 48
End: 2023-08-24
Payer: COMMERCIAL

## 2023-08-24 ENCOUNTER — TELEPHONE (OUTPATIENT)
Dept: TRANSPLANT | Facility: CLINIC | Age: 48
End: 2023-08-24
Payer: COMMERCIAL

## 2023-08-24 NOTE — PROGRESS NOTES
Message received from Zora inquring about DonorShield benefits around hernia repair and also about any recommended lifting restriction from Dr. King.     Message sent to RODRIGUEZ regarding DonorShield and lifting restriction reviewed with Dr. King who indicated no lifting restriction/whatever is comfortable. Additionally Dr. King commented that she would like Zora to lose the 20lbs gained since donation prior to proceeding to give the best chance of durable repair.     Message sent to Zora updating her of above.

## 2023-08-24 NOTE — TELEPHONE ENCOUNTER
LVM to discuss donor shield benefits if she were to have hernia surgery. Requested call back.     Zora called back. No surgery date scheduled yet. She will call and request assistance with wage reimbursement when the time comes. She anticipates needing 6 weeks off.     Daphney Estrada [facility] CenterRODRIGUEZ, CCTSW   Living Donor   [facility], [facility], [facility]  Direct: [phone]  E-Mail: [email or web address]

## 2023-09-11 ENCOUNTER — DOCUMENTATION ONLY (OUTPATIENT)
Dept: TRANSPLANT | Facility: CLINIC | Age: 48
End: 2023-09-11
Payer: COMMERCIAL

## 2023-09-11 NOTE — LETTER
PHYSICIAN ORDERS  DONOR ORDERS      Patient Name: Zora López   YOB: 1975     [facility] MR# [if applicable]: [id number]   Date & Time: September 21, 2023  11:54 AM  DIAGNOSIS:  kidney donor  ICD-10 CODE:  Z00.5      Therapy visit x3 around the time of hernia repair surgery scheduled for 11/10/23.      Please find below billing instructions.               Florence King MD FACS  Associate Professor of Surgery  Director, Living Kidney Donor Program.    PLEASE FAX RESULTS [phone]  ATTN:             REIMBURSEMENT INFORMATION FOR LIVING ORGAN DONORS    LIVING ORGAN DONOR: This form MUST accompany & remain attached to Orders &  given to Provider and/or Healthcare Facility Business Office    PROVIDER/FACILITY INSTRUCTIONS: By accepting to perform these services for living organ  donation, the provider/facility agrees to exclusively bill the [facility] instead of billing  the patient or any insurance provider and agrees to accept the reimbursement, as described below, as  payment in full for services rendered.    PROVIDER BILLING INSTRUCTIONS:  1. [facility] agrees to pay for all authorized testing ordered by our transplant  program that is related to living organ donation. The attached orders/tests are part of the donor  Evaluation.    2. Do not bill the donor or donor's insurance. Send an itemized invoice, claim or statement to:    [facility]  Transplant Finance/Donor Billing  [address] [place] [address], [place] [address]    3. Billing statements must include the patient first and last name, date of birth, the CPT procedure code  and date of service. Please bill service on the ORIGINAL UBO4 or 1500 with appropriate CPT/HCPCS  codes along with W-9 and send to the above address to insure timely reimbursement.    4. Claims should be submitted no later than six months from the date when services are  rendered.  Claims denied for late submission should not be billed to the donor or their private insurance carrier.    5. [facility] will reimburse all charges at 100% of the Medicare Fee Schedule as  defined in the Code of Federal Regulations (CFR) 42, Chapter IV. This is to be considered payment  in full. [facility], the patient, and/or the patient's insurance are NOT to  be billed any balance, co-payment, or deductible, per Medicare regulations. **ATTN: Facility  providing services for attached/enclosed Living Donor Orders; If facility does NOT AGREE to  the reimbursement rate stated above, PLEASE DENY SERVICES & refer Donor/patient back to  their [facility].    6. Patients are NOT to make any payments at the time of service.    Please forward this information to your billing department so that a donor account can be set up with  these instructions.    Should you have any questions, please contact the Donor Billing office at [phone],  Monday - Friday, 8:00 a.m. to 4:00 p.m.   Thank you for your assistance.

## 2023-09-14 ENCOUNTER — PREP FOR PROCEDURE (OUTPATIENT)
Dept: TRANSPLANT | Facility: CLINIC | Age: 48
End: 2023-09-14
Payer: COMMERCIAL

## 2023-09-14 DIAGNOSIS — K43.2 HERNIA, INCISIONAL: Primary | ICD-10-CM

## 2023-09-16 ENCOUNTER — HEALTH MAINTENANCE LETTER (OUTPATIENT)
Age: 48
End: 2023-09-16

## 2023-09-18 ENCOUNTER — TELEPHONE (OUTPATIENT)
Dept: TRANSPLANT | Facility: CLINIC | Age: 48
End: 2023-09-18
Payer: COMMERCIAL

## 2023-09-18 NOTE — PROGRESS NOTES
Message received from Zora regarding possible therapy visits under donor plan prior to hernia. Message sent to RODRIGUEZ to follow up regarding mental health resources and plan.

## 2023-09-18 NOTE — TELEPHONE ENCOUNTER
SW called Rosi-- she has hernia repair surgery date 11/10/23. SW to assist with wage reimbursement at that time. She will send pay stubs and tax return.     Rosi wants to return to therapy with her provider that she had last year upon suggestion of this SW. She has scheduled an appt to prevent any negative feelings that she had post donation last year. A few visits to be requested to be billed under donor complication, then will switch to Rosi's own insurance-- she understands this and is agreeable as her therapist is in network.     Daphney Estrada, [facility], [facility] HospitalSW   Living Donor   [facility], [facility], [facility]  Direct: [phone]  E-Mail: [email or web address]     8

## 2023-09-18 NOTE — PROGRESS NOTES
Message received reporting Zora ready to proceed with scheduling her hernia repair. Message sent to clinic for scheduling.

## 2023-09-21 NOTE — PROGRESS NOTES
Approval for up to 3 therapy visit to be billed under donor coverage for Zora around the time of her upcoming hernia repair.     Order placed and sent to Dania Woody.

## 2023-10-16 ENCOUNTER — OFFICE VISIT (OUTPATIENT)
Dept: TRANSPLANT | Facility: CLINIC | Age: 48
End: 2023-10-16
Attending: NURSE PRACTITIONER
Payer: COMMERCIAL

## 2023-10-16 ENCOUNTER — LAB (OUTPATIENT)
Dept: LAB | Facility: CLINIC | Age: 48
End: 2023-10-16
Attending: NURSE PRACTITIONER

## 2023-10-16 ENCOUNTER — ANCILLARY PROCEDURE (OUTPATIENT)
Dept: GENERAL RADIOLOGY | Facility: CLINIC | Age: 48
End: 2023-10-16
Attending: NURSE PRACTITIONER

## 2023-10-16 VITALS
TEMPERATURE: 97.7 F | SYSTOLIC BLOOD PRESSURE: 134 MMHG | BODY MASS INDEX: 35.32 KG/M2 | OXYGEN SATURATION: 98 % | WEIGHT: 219.8 LBS | DIASTOLIC BLOOD PRESSURE: 90 MMHG | HEIGHT: 66 IN | HEART RATE: 88 BPM

## 2023-10-16 DIAGNOSIS — Z52.4 KIDNEY DONOR: ICD-10-CM

## 2023-10-16 DIAGNOSIS — Z09 SURGERY FOLLOW-UP: ICD-10-CM

## 2023-10-16 DIAGNOSIS — Z01.818 PRE-OP EXAM: Primary | ICD-10-CM

## 2023-10-16 DIAGNOSIS — Z01.818 PRE-OP EXAM: ICD-10-CM

## 2023-10-16 LAB — INR PPP: 1.02 (ref 0.85–1.15)

## 2023-10-16 PROCEDURE — 36415 COLL VENOUS BLD VENIPUNCTURE: CPT | Performed by: PATHOLOGY

## 2023-10-16 PROCEDURE — 99213 OFFICE O/P EST LOW 20 MIN: CPT | Performed by: NURSE PRACTITIONER

## 2023-10-16 PROCEDURE — 71046 X-RAY EXAM CHEST 2 VIEWS: CPT | Mod: GC | Performed by: RADIOLOGY

## 2023-10-16 PROCEDURE — 99214 OFFICE O/P EST MOD 30 MIN: CPT | Performed by: NURSE PRACTITIONER

## 2023-10-16 PROCEDURE — 85610 PROTHROMBIN TIME: CPT | Performed by: PATHOLOGY

## 2023-10-16 ASSESSMENT — PAIN SCALES - GENERAL: PAINLEVEL: MILD PAIN (2)

## 2023-10-16 NOTE — PROGRESS NOTES
Transplant Surgery H&P                                                        HPI:                                                      Ms. López is a 48 year old female who comes to clinic today for preop prior to planned  incisional hernia repair    Special considerations:   Constipation: no  PONV: yes  Temple: No    MEDICAL HISTORY:                                                      Patient Active Problem List    Diagnosis Date Noted    Acute post-operative pain 02/04/2022     Priority: Medium    Encounter for donation of kidney 02/03/2022     Priority: Medium    Donor of kidney for transplant 05/13/2021     Priority: Medium      Past Medical History:   Diagnosis Date    Miscarriage     x2 in the first trimester    Plantar fasciitis      Past Surgical History:   Procedure Laterality Date    APPENDECTOMY      LAPAROSCOPIC DONOR HAND ASSISTED KIDNEY LIVING RELATED N/A 2/3/2022    Procedure: Laparoscopic Hand Assisted Living Related Kidney Donor;  Surgeon: Florence King MD;  Location: [facility] OR     Current Outpatient Medications   Medication Sig Dispense Refill    acetaminophen (TYLENOL) 325 MG tablet Take 2 tablets (650 mg) by mouth every 6 hours (Patient not taking: Reported on 3/22/2022) 60 tablet 0    ondansetron (ZOFRAN-ODT) 4 MG ODT tab Take 1 tablet (4 mg) by mouth every 6 hours as needed for nausea or vomiting (Patient not taking: Reported on 3/22/2022) 20 tablet 0    oxyCODONE (ROXICODONE) 5 MG tablet Take 1-2 tablets (5-10 mg) by mouth every 4 hours as needed for moderate to severe pain (Patient not taking: Reported on 2/15/2022) 15 tablet 0    promethazine (PHENERGAN) 12.5 MG tablet Take 1 tablet (12.5 mg) by mouth every 6 hours as needed for nausea (Patient not taking: Reported on 3/22/2022) 10 tablet 0    senna-docusate (SENOKOT-S/PERICOLACE) 8.6-50 MG tablet Take 1-2 tablets by mouth 2 times daily as needed for constipation (Patient not taking: Reported on 3/22/2022) 60 tablet 0     venlafaxine (EFFEXOR XR) 37.5 MG 24 hr capsule Take 37.5 mg by mouth daily       OTC products: None, except as noted above  No Known Allergies   Social History     Tobacco Use    Smoking status: Never    Smokeless tobacco: Never   Substance Use Topics    Alcohol use: Yes     Alcohol/week: 3.0 - 4.0 standard drinks of alcohol     Types: 3 - 4 Standard drinks or equivalent per week     Comment: rarely     History   Drug Use Unknown       REVIEW OF SYSTEMS:                                                    CONSTITUTIONAL: NEGATIVE for fever, chills, change in weight  INTEGUMENTARY/SKIN: NEGATIVE for worrisome rashes, moles or lesions  EYES: NEGATIVE for vision changes or irritation  ENT/MOUTH: NEGATIVE for ear, mouth and throat problems  RESP: NEGATIVE for significant cough or SOB  CV: NEGATIVE for chest pain, palpitations or peripheral edema  GI: NEGATIVE for nausea, abdominal pain, heartburn, or change in bowel habits  : NEGATIVE for frequency, dysuria, or hematuria  MUSCULOSKELETAL: NEGATIVE for significant arthralgias or myalgia  NEURO: NEGATIVE for weakness, dizziness or paresthesias  ENDOCRINE: NEGATIVE for temperature intolerance, skin/hair changes  HEME: NEGATIVE for bleeding problems  PSYCHIATRIC: NEGATIVE for changes in mood or affect    EXAM:                                                    There were no vitals taken for this visit.    GENERAL APPEARANCE: healthy, alert and no distress     EYES: EOMI, PERRL     HENT: ear canals and TM's normal and nose and mouth without ulcers or lesions     NECK: no adenopathy, no asymmetry, masses, or scars and thyroid normal to palpation     RESP: lungs clear to auscultation - no rales, rhonchi or wheezes     CV: regular rates and rhythm, normal S1 S2, no S3 or S4 and no murmur, click or rub     ABDOMEN:  soft, nontender, no HSM or masses and bowel sounds normal. Incisional hernia      MS: extremities normal- no gross deformities noted, no evidence of inflammation in  joints, FROM in all extremities.     SKIN: no suspicious lesions or rashes     NEURO: Normal strength and tone, sensory exam grossly normal, mentation intact and speech normal     PSYCH: mentation appears normal. and affect normal/bright     LYMPHATICS: No cervical adenopathy    DIAGNOSTICS:                                                    EKG: appears normal, NSR, normal axis, normal intervals, no acute ST/T changes c/w ischemia, no LVH by voltage criteria, unchanged from previous tracings  Chest XRay  Labs Resulted Today: No results found for any visits on 10/16/23.  Labs Drawn and in Process:   Unresulted Labs Ordered in the Past 30 Days of this Admission       No orders found from 9/16/2023 to 10/17/2023.          Recent Labs   Lab Test 03/18/23  1313 08/11/22  1432 03/22/22  0916 02/16/22  1237 02/04/22  0010 02/03/22  1637 01/25/22  1001 05/13/21  0636   HGB  --   --   --  14.1 12.7 13.2   < > 14.1   PLT  --   --   --  461*  --  224  --  258   INR  --   --   --   --   --   --   --  1.00   NA  --   --   --  135  --   --   --  140   POTASSIUM  --   --   --  4.0  --   --   --  4.1   CR 1.18* 1.16*   < > 1.19* 1.27*  --    < > 0.88   A1C  --   --   --   --   --   --   --  5.2    < > = values in this interval not displayed.      Assessment/Plan:                                                    Patient is medically appropriate for hernia repair   1. Labs reviewed and within normal limits: Yes  2. EKG (10/16/2023): pending  3. ABO= O POS          Signed Electronically by: Maggie Bonilla NP

## 2023-10-16 NOTE — NURSING NOTE
"Chief Complaint   Patient presents with    Follow Up     Transplant donor hernia preop     Vital signs:  Temp: 97.7  F (36.5  C) Temp src: Oral BP: (!) 134/90 Pulse: 88     SpO2: 98 %     Height: 167.6 cm (5' 6\") Weight: 99.7 kg (219 lb 12.8 oz)  Estimated body mass index is 35.48 kg/m  as calculated from the following:    Height as of this encounter: 1.676 m (5' 6\").    Weight as of this encounter: 99.7 kg (219 lb 12.8 oz).      Nahid Hale RN on 10/16/2023 at 1:13 PM    "

## 2023-10-16 NOTE — LETTER
10/16/2023         RE: Zora López  [address] [facility] [address]        Dear Colleague,    Thank you for referring your patient, Zora López, to the [facility]. Please see a copy of my visit note below.    Transplant Surgery H&P                                                        HPI:                                                      Ms. López is a 48 year old female who comes to clinic today for preop prior to planned  incisional hernia repair    Special considerations:   Constipation: no  PONV: yes  Christian: No    MEDICAL HISTORY:                                                      Patient Active Problem List    Diagnosis Date Noted    Acute post-operative pain 02/04/2022     Priority: Medium    Encounter for donation of kidney 02/03/2022     Priority: Medium    Donor of kidney for transplant 05/13/2021     Priority: Medium      Past Medical History:   Diagnosis Date    Miscarriage     x2 in the first trimester    Plantar fasciitis      Past Surgical History:   Procedure Laterality Date    APPENDECTOMY      LAPAROSCOPIC DONOR HAND ASSISTED KIDNEY LIVING RELATED N/A 2/3/2022    Procedure: Laparoscopic Hand Assisted Living Related Kidney Donor;  Surgeon: Florence King MD;  Location: [facility] OR     Current Outpatient Medications   Medication Sig Dispense Refill    acetaminophen (TYLENOL) 325 MG tablet Take 2 tablets (650 mg) by mouth every 6 hours (Patient not taking: Reported on 3/22/2022) 60 tablet 0    ondansetron (ZOFRAN-ODT) 4 MG ODT tab Take 1 tablet (4 mg) by mouth every 6 hours as needed for nausea or vomiting (Patient not taking: Reported on 3/22/2022) 20 tablet 0    oxyCODONE (ROXICODONE) 5 MG tablet Take 1-2 tablets (5-10 mg) by mouth every 4 hours as needed for moderate to severe pain (Patient not taking: Reported on 2/15/2022) 15 tablet 0    promethazine (PHENERGAN) 12.5 MG tablet Take 1 tablet (12.5 mg) by mouth every  6 hours as needed for nausea (Patient not taking: Reported on 3/22/2022) 10 tablet 0    senna-docusate (SENOKOT-S/PERICOLACE) 8.6-50 MG tablet Take 1-2 tablets by mouth 2 times daily as needed for constipation (Patient not taking: Reported on 3/22/2022) 60 tablet 0    venlafaxine (EFFEXOR XR) 37.5 MG 24 hr capsule Take 37.5 mg by mouth daily       OTC products: None, except as noted above  No Known Allergies   Social History     Tobacco Use    Smoking status: Never    Smokeless tobacco: Never   Substance Use Topics    Alcohol use: Yes     Alcohol/week: 3.0 - 4.0 standard drinks of alcohol     Types: 3 - 4 Standard drinks or equivalent per week     Comment: rarely     History   Drug Use Unknown       REVIEW OF SYSTEMS:                                                    CONSTITUTIONAL: NEGATIVE for fever, chills, change in weight  INTEGUMENTARY/SKIN: NEGATIVE for worrisome rashes, moles or lesions  EYES: NEGATIVE for vision changes or irritation  ENT/MOUTH: NEGATIVE for ear, mouth and throat problems  RESP: NEGATIVE for significant cough or SOB  CV: NEGATIVE for chest pain, palpitations or peripheral edema  GI: NEGATIVE for nausea, abdominal pain, heartburn, or change in bowel habits  : NEGATIVE for frequency, dysuria, or hematuria  MUSCULOSKELETAL: NEGATIVE for significant arthralgias or myalgia  NEURO: NEGATIVE for weakness, dizziness or paresthesias  ENDOCRINE: NEGATIVE for temperature intolerance, skin/hair changes  HEME: NEGATIVE for bleeding problems  PSYCHIATRIC: NEGATIVE for changes in mood or affect    EXAM:                                                    There were no vitals taken for this visit.    GENERAL APPEARANCE: healthy, alert and no distress     EYES: EOMI, PERRL     HENT: ear canals and TM's normal and nose and mouth without ulcers or lesions     NECK: no adenopathy, no asymmetry, masses, or scars and thyroid normal to palpation     RESP: lungs clear to auscultation - no rales, rhonchi or  wheezes     CV: regular rates and rhythm, normal S1 S2, no S3 or S4 and no murmur, click or rub     ABDOMEN:  soft, nontender, no HSM or masses and bowel sounds normal. Incisional hernia      MS: extremities normal- no gross deformities noted, no evidence of inflammation in joints, FROM in all extremities.     SKIN: no suspicious lesions or rashes     NEURO: Normal strength and tone, sensory exam grossly normal, mentation intact and speech normal     PSYCH: mentation appears normal. and affect normal/bright     LYMPHATICS: No cervical adenopathy    DIAGNOSTICS:                                                    EKG: appears normal, NSR, normal axis, normal intervals, no acute ST/T changes c/w ischemia, no LVH by voltage criteria, unchanged from previous tracings  Chest XRay  Labs Resulted Today: No results found for any visits on 10/16/23.  Labs Drawn and in Process:   Unresulted Labs Ordered in the Past 30 Days of this Admission       No orders found from 9/16/2023 to 10/17/2023.          Recent Labs   Lab Test 03/18/23  1313 08/11/22  1432 03/22/22  0916 02/16/22  1237 02/04/22  0010 02/03/22  1637 01/25/22  1001 05/13/21  0636   HGB  --   --   --  14.1 12.7 13.2   < > 14.1   PLT  --   --   --  461*  --  224  --  258   INR  --   --   --   --   --   --   --  1.00   NA  --   --   --  135  --   --   --  140   POTASSIUM  --   --   --  4.0  --   --   --  4.1   CR 1.18* 1.16*   < > 1.19* 1.27*  --    < > 0.88   A1C  --   --   --   --   --   --   --  5.2    < > = values in this interval not displayed.      Assessment/Plan:                                                    Patient is medically appropriate for hernia repair   1. Labs reviewed and within normal limits: Yes  2. EKG (10/16/2023): pending  3. ABO= O POS          Signed Electronically by: Maggie Bonilla NP         Again, thank you for allowing me to participate in the care of your patient.        Sincerely,        Maggie Bonilla NP

## 2023-10-17 ENCOUNTER — TELEPHONE (OUTPATIENT)
Dept: TRANSPLANT | Facility: CLINIC | Age: 48
End: 2023-10-17
Payer: COMMERCIAL

## 2023-10-17 NOTE — TELEPHONE ENCOUNTER
Outreach call made to Zora to check in ahead of her scheduled hernia repair surgery in November with Dr. King.     Zora completed her preop with Maggie Bonilla yesterday. Zora acknowledged feeling anxious about the upcoming surgery. She has been engaging in therapy and plans to reengage with her pelvic floor provider. Reviewed that she does have approval for up to 3 therapy visits that can be covered under her donor coverage and can continue as needed with her own coverage. Questions answered and reassurance and acknowledgement of feelings provided.

## 2023-10-18 ENCOUNTER — MEDICAL CORRESPONDENCE (OUTPATIENT)
Dept: HEALTH INFORMATION MANAGEMENT | Facility: CLINIC | Age: 48
End: 2023-10-18
Payer: COMMERCIAL

## 2023-10-27 ENCOUNTER — TELEPHONE (OUTPATIENT)
Dept: TRANSPLANT | Facility: CLINIC | Age: 48
End: 2023-10-27
Payer: COMMERCIAL

## 2023-10-27 NOTE — TELEPHONE ENCOUNTER
ASYA called [facility] mental health provider Joan Muñoz () to collaborate regarding a request to cover more MH treatment under donor billing. Requested a call or email back.     Daphney Estrada [facility] HospitalRODRIG[facility]Z, [facility] PointeSW   Independent Living Donor Advocate  [facility], [facility]  Direct: [phone]  E-Mail: [email or web address]     I have personally seen and examined this patient.  I have fully participated in the care of this patient. I have reviewed all pertinent clinical information, including history, physical exam, plan and the Resident’s note and agree except as noted.

## 2023-11-07 ENCOUNTER — TELEPHONE (OUTPATIENT)
Dept: TRANSPLANT | Facility: CLINIC | Age: 48
End: 2023-11-07
Payer: COMMERCIAL

## 2023-11-07 NOTE — TELEPHONE ENCOUNTER
ASYA called Zora's therapist Joan Muñoz () once again to follow up on fax she sent regarding donor team covering therapy bills. Left message with contact info once again.     AYSA also called Zora to discuss upcoming hernia surgery and donor shield. LVM requesting a call back.     Daphney Estrada [facility], [facility]   Independent Living Donor Advocate  [facility], [facility], [facility]  Direct: [phone]  E-Mail: [email or web address]

## 2023-11-09 ENCOUNTER — ANESTHESIA EVENT (OUTPATIENT)
Dept: SURGERY | Facility: CLINIC | Age: 48
End: 2023-11-09

## 2023-11-09 ENCOUNTER — TELEPHONE (OUTPATIENT)
Dept: TRANSPLANT | Facility: CLINIC | Age: 48
End: 2023-11-09
Payer: COMMERCIAL

## 2023-11-09 NOTE — TELEPHONE ENCOUNTER
ASYA spoke to Zora. Obtained documents to submit for donor shield wage reimbursement for hernia surgery recovery. Will send to [facility].     ASYA also explained that donor team got a fax requesting further therapy sessions be covered by donor billing-- ASYA explained that further documentation from therapist would be needed in order to approve this, but ASYA has left several voicemails and emails for therapist. ASYA requested that Zora let therapist, Joan Muñoz, know that ASYA is trying to reach her. She was agreeable to do this.     Daphney Estrada, LICSW, CCTSW   Independent Living Donor Advocate  [facility], [facility], [facility]  Direct: [phone]  E-Mail: [email or web address]

## 2023-11-09 NOTE — ANESTHESIA PREPROCEDURE EVALUATION
Anesthesia Pre-Procedure Evaluation    Patient: Zora López   MRN: [id number] : 1975        Procedure : Procedure(s):  HERNIORRHAPHY, INCISIONAL, OPEN          Past Medical History:   Diagnosis Date    Miscarriage     x2 in the first trimester    Plantar fasciitis       Past Surgical History:   Procedure Laterality Date    APPENDECTOMY      LAPAROSCOPIC DONOR HAND ASSISTED KIDNEY LIVING RELATED N/A 2/3/2022    Procedure: Laparoscopic Hand Assisted Living Related Kidney Donor;  Surgeon: Florence King MD;  Location:  OR      No Known Allergies   Social History     Tobacco Use    Smoking status: Never    Smokeless tobacco: Never   Substance Use Topics    Alcohol use: Not Currently     Alcohol/week: 0.0 - 1.0 standard drinks of alcohol     Comment: rarely      Wt Readings from Last 1 Encounters:   10/16/23 99.7 kg (219 lb 12.8 oz)        Anesthesia Evaluation            ROS/MED HX  ENT/Pulmonary:  - neg pulmonary ROS     Neurologic:  - neg neurologic ROS     Cardiovascular:  - neg cardiovascular ROS     METS/Exercise Tolerance: >4 METS    Hematologic:  - neg hematologic  ROS     Musculoskeletal:  - neg musculoskeletal ROS     GI/Hepatic:  - neg GI/hepatic ROS     Renal/Genitourinary:  - neg Renal ROS     Endo:     (+)               Obesity,       Psychiatric/Substance Use:  - neg psychiatric ROS     Infectious Disease:  - neg infectious disease ROS     Malignancy:  - neg malignancy ROS     Other:  - neg other ROS          Physical Exam    Airway        Mallampati: I   TM distance: > 3 FB   Neck ROM: full   Mouth opening: > 3 cm    Respiratory Devices and Support         Dental       (+) Completely normal teeth      Cardiovascular   cardiovascular exam normal       Rhythm and rate: regular and normal     Pulmonary   pulmonary exam normal        breath sounds clear to auscultation           OUTSIDE LABS:  CBC:   Lab Results   Component Value Date    WBC 14.2 (H) 2022    WBC 22.6 (H)  02/03/2022    HGB 14.1 02/16/2022    HGB 12.7 02/04/2022    HCT 41.8 02/16/2022    HCT 39.1 02/04/2022     (H) 02/16/2022     02/03/2022     BMP:   Lab Results   Component Value Date     02/16/2022     05/13/2021    POTASSIUM 4.0 02/16/2022    POTASSIUM 4.1 05/13/2021    CHLORIDE 103 02/16/2022    CHLORIDE 107 05/13/2021    CO2 23 02/16/2022    CO2 30 05/13/2021    BUN 15 02/16/2022    BUN 18 02/04/2022    CR 1.18 (H) 03/18/2023    CR 1.16 (H) 08/11/2022     (H) 02/16/2022    GLC 84 02/03/2022     COAGS:   Lab Results   Component Value Date    PTT 32 05/13/2021    INR 1.02 10/16/2023     POC:   Lab Results   Component Value Date    HCG Negative 10/31/2009     HEPATIC:   Lab Results   Component Value Date    ALBUMIN 3.8 05/13/2021    PROTTOTAL 7.7 05/13/2021    ALT 18 05/13/2021    AST 14 05/13/2021    ALKPHOS 81 05/13/2021    BILITOTAL 0.7 05/13/2021     OTHER:   Lab Results   Component Value Date    A1C 5.2 05/13/2021    YVONNE 9.3 02/16/2022    PHOS 2.6 02/16/2022    MAG 2.3 02/16/2022       Anesthesia Plan    ASA Status:  3    NPO Status:  NPO Appropriate    Anesthesia Type: General.     - Airway: ETT   Induction: Intravenous.   Maintenance: Balanced.   Techniques and Equipment:     - Lines/Monitors: BIS     Consents    Anesthesia Plan(s) and associated risks, benefits, and realistic alternatives discussed. Questions answered and patient/representative(s) expressed understanding.     - Discussed: Risks, Benefits and Alternatives for the PROCEDURE were discussed     - Discussed with:  Patient            Postoperative Care    Pain management: IV analgesics, Oral pain medications, Multi-modal analgesia.   PONV prophylaxis: Ondansetron (or other 5HT-3), Promethazine or metoclopramide, Dexamethasone or Solumedrol     Comments:                Florentino Arriola MD

## 2023-11-10 ENCOUNTER — ANESTHESIA (OUTPATIENT)
Dept: SURGERY | Facility: CLINIC | Age: 48
End: 2023-11-10

## 2023-11-10 ENCOUNTER — HOSPITAL ENCOUNTER (OUTPATIENT)
Facility: CLINIC | Age: 48
Discharge: HOME | End: 2023-11-10
Attending: SURGERY | Admitting: SURGERY
Payer: COMMERCIAL

## 2023-11-10 ENCOUNTER — ANCILLARY PROCEDURE (OUTPATIENT)
Dept: ULTRASOUND IMAGING | Facility: CLINIC | Age: 48
End: 2023-11-10
Payer: COMMERCIAL

## 2023-11-10 VITALS
TEMPERATURE: 97.7 F | HEART RATE: 90 BPM | OXYGEN SATURATION: 97 % | DIASTOLIC BLOOD PRESSURE: 73 MMHG | SYSTOLIC BLOOD PRESSURE: 115 MMHG | HEIGHT: 67 IN | BODY MASS INDEX: 34.53 KG/M2 | WEIGHT: 220.02 LBS | RESPIRATION RATE: 15 BRPM

## 2023-11-10 DIAGNOSIS — Z52.4 ENCOUNTER FOR DONATION OF KIDNEY: ICD-10-CM

## 2023-11-10 DIAGNOSIS — Z52.4 DONOR OF KIDNEY FOR TRANSPLANT: Primary | ICD-10-CM

## 2023-11-10 DIAGNOSIS — G89.18 ACUTE POST-OPERATIVE PAIN: ICD-10-CM

## 2023-11-10 PROCEDURE — 360N000075 HC SURGERY LEVEL 2, PER MIN: Performed by: SURGERY

## 2023-11-10 PROCEDURE — 272N000001 HC OR GENERAL SUPPLY STERILE: Performed by: SURGERY

## 2023-11-10 PROCEDURE — 250N000011 HC RX IP 250 OP 636: Mod: JZ | Performed by: STUDENT IN AN ORGANIZED HEALTH CARE EDUCATION/TRAINING PROGRAM

## 2023-11-10 PROCEDURE — 710N000012 HC RECOVERY PHASE 2, PER MINUTE: Performed by: SURGERY

## 2023-11-10 PROCEDURE — 250N000011 HC RX IP 250 OP 636: Performed by: STUDENT IN AN ORGANIZED HEALTH CARE EDUCATION/TRAINING PROGRAM

## 2023-11-10 PROCEDURE — 999N000141 HC STATISTIC PRE-PROCEDURE NURSING ASSESSMENT: Performed by: SURGERY

## 2023-11-10 PROCEDURE — 250N000011 HC RX IP 250 OP 636: Mod: JZ | Performed by: ANESTHESIOLOGY

## 2023-11-10 PROCEDURE — 250N000009 HC RX 250: Performed by: ANESTHESIOLOGY

## 2023-11-10 PROCEDURE — 250N000011 HC RX IP 250 OP 636: Mod: JZ | Performed by: SURGERY

## 2023-11-10 PROCEDURE — 250N000011 HC RX IP 250 OP 636: Performed by: SURGERY

## 2023-11-10 PROCEDURE — 250N000025 HC SEVOFLURANE, PER MIN: Performed by: SURGERY

## 2023-11-10 PROCEDURE — 250N000013 HC RX MED GY IP 250 OP 250 PS 637: Performed by: ANESTHESIOLOGY

## 2023-11-10 PROCEDURE — 710N000009 HC RECOVERY PHASE 1, LEVEL 1, PER MIN: Performed by: SURGERY

## 2023-11-10 PROCEDURE — 370N000017 HC ANESTHESIA TECHNICAL FEE, PER MIN: Performed by: SURGERY

## 2023-11-10 PROCEDURE — C9290 INJ, BUPIVACAINE LIPOSOME: HCPCS | Performed by: STUDENT IN AN ORGANIZED HEALTH CARE EDUCATION/TRAINING PROGRAM

## 2023-11-10 PROCEDURE — 258N000003 HC RX IP 258 OP 636: Performed by: ANESTHESIOLOGY

## 2023-11-10 PROCEDURE — C1781 MESH (IMPLANTABLE): HCPCS | Performed by: SURGERY

## 2023-11-10 RX ORDER — DEXAMETHASONE SODIUM PHOSPHATE 4 MG/ML
INJECTION, SOLUTION INTRA-ARTICULAR; INTRALESIONAL; INTRAMUSCULAR; INTRAVENOUS; SOFT TISSUE PRN
Status: DISCONTINUED | OUTPATIENT
Start: 2023-11-10 | End: 2023-11-10

## 2023-11-10 RX ORDER — NALOXONE HYDROCHLORIDE 0.4 MG/ML
0.4 INJECTION, SOLUTION INTRAMUSCULAR; INTRAVENOUS; SUBCUTANEOUS
Status: DISCONTINUED | OUTPATIENT
Start: 2023-11-10 | End: 2023-11-10 | Stop reason: HOSPADM

## 2023-11-10 RX ORDER — FENTANYL CITRATE 50 UG/ML
50 INJECTION, SOLUTION INTRAMUSCULAR; INTRAVENOUS EVERY 5 MIN PRN
Status: DISCONTINUED | OUTPATIENT
Start: 2023-11-10 | End: 2023-11-10 | Stop reason: HOSPADM

## 2023-11-10 RX ORDER — ONDANSETRON 2 MG/ML
INJECTION INTRAMUSCULAR; INTRAVENOUS PRN
Status: DISCONTINUED | OUTPATIENT
Start: 2023-11-10 | End: 2023-11-10

## 2023-11-10 RX ORDER — HEPARIN SODIUM 5000 [USP'U]/.5ML
5000 INJECTION, SOLUTION INTRAVENOUS; SUBCUTANEOUS
Status: COMPLETED | OUTPATIENT
Start: 2023-11-10 | End: 2023-11-10

## 2023-11-10 RX ORDER — ONDANSETRON 4 MG/1
4 TABLET, ORALLY DISINTEGRATING ORAL EVERY 30 MIN PRN
Status: DISCONTINUED | OUTPATIENT
Start: 2023-11-10 | End: 2023-11-10 | Stop reason: HOSPADM

## 2023-11-10 RX ORDER — CEFAZOLIN SODIUM/WATER 2 G/20 ML
2 SYRINGE (ML) INTRAVENOUS SEE ADMIN INSTRUCTIONS
Status: DISCONTINUED | OUTPATIENT
Start: 2023-11-10 | End: 2023-11-10 | Stop reason: HOSPADM

## 2023-11-10 RX ORDER — BUPIVACAINE HYDROCHLORIDE 2.5 MG/ML
INJECTION, SOLUTION INFILTRATION; PERINEURAL PRN
Status: DISCONTINUED | OUTPATIENT
Start: 2023-11-10 | End: 2023-11-10 | Stop reason: HOSPADM

## 2023-11-10 RX ORDER — SODIUM CHLORIDE, SODIUM LACTATE, POTASSIUM CHLORIDE, CALCIUM CHLORIDE 600; 310; 30; 20 MG/100ML; MG/100ML; MG/100ML; MG/100ML
INJECTION, SOLUTION INTRAVENOUS CONTINUOUS PRN
Status: DISCONTINUED | OUTPATIENT
Start: 2023-11-10 | End: 2023-11-10

## 2023-11-10 RX ORDER — SCOLOPAMINE TRANSDERMAL SYSTEM 1 MG/1
1 PATCH, EXTENDED RELEASE TRANSDERMAL
Status: DISCONTINUED | OUTPATIENT
Start: 2023-11-10 | End: 2023-11-10 | Stop reason: HOSPADM

## 2023-11-10 RX ORDER — CEFAZOLIN SODIUM/WATER 2 G/20 ML
2 SYRINGE (ML) INTRAVENOUS
Status: COMPLETED | OUTPATIENT
Start: 2023-11-10 | End: 2023-11-10

## 2023-11-10 RX ORDER — OXYCODONE HYDROCHLORIDE 10 MG/1
10 TABLET ORAL
Status: DISCONTINUED | OUTPATIENT
Start: 2023-11-10 | End: 2023-11-10 | Stop reason: HOSPADM

## 2023-11-10 RX ORDER — HYDROMORPHONE HCL IN WATER/PF 6 MG/30 ML
0.2 PATIENT CONTROLLED ANALGESIA SYRINGE INTRAVENOUS EVERY 5 MIN PRN
Status: DISCONTINUED | OUTPATIENT
Start: 2023-11-10 | End: 2023-11-10 | Stop reason: HOSPADM

## 2023-11-10 RX ORDER — FENTANYL CITRATE 50 UG/ML
INJECTION, SOLUTION INTRAMUSCULAR; INTRAVENOUS PRN
Status: DISCONTINUED | OUTPATIENT
Start: 2023-11-10 | End: 2023-11-10

## 2023-11-10 RX ORDER — OXYCODONE HYDROCHLORIDE 5 MG/1
5 TABLET ORAL EVERY 6 HOURS PRN
Qty: 20 TABLET | Refills: 0 | Status: SHIPPED | OUTPATIENT
Start: 2023-11-10 | End: 2023-11-15

## 2023-11-10 RX ORDER — ONDANSETRON 2 MG/ML
4 INJECTION INTRAMUSCULAR; INTRAVENOUS EVERY 30 MIN PRN
Status: DISCONTINUED | OUTPATIENT
Start: 2023-11-10 | End: 2023-11-10 | Stop reason: HOSPADM

## 2023-11-10 RX ORDER — NALOXONE HYDROCHLORIDE 0.4 MG/ML
0.2 INJECTION, SOLUTION INTRAMUSCULAR; INTRAVENOUS; SUBCUTANEOUS
Status: DISCONTINUED | OUTPATIENT
Start: 2023-11-10 | End: 2023-11-10 | Stop reason: HOSPADM

## 2023-11-10 RX ORDER — BUPIVACAINE HYDROCHLORIDE 2.5 MG/ML
INJECTION, SOLUTION EPIDURAL; INFILTRATION; INTRACAUDAL
Status: COMPLETED | OUTPATIENT
Start: 2023-11-10 | End: 2023-11-10

## 2023-11-10 RX ORDER — FENTANYL CITRATE 50 UG/ML
25 INJECTION, SOLUTION INTRAMUSCULAR; INTRAVENOUS EVERY 5 MIN PRN
Status: DISCONTINUED | OUTPATIENT
Start: 2023-11-10 | End: 2023-11-10 | Stop reason: HOSPADM

## 2023-11-10 RX ORDER — LIDOCAINE HYDROCHLORIDE 20 MG/ML
INJECTION, SOLUTION INFILTRATION; PERINEURAL PRN
Status: DISCONTINUED | OUTPATIENT
Start: 2023-11-10 | End: 2023-11-10

## 2023-11-10 RX ORDER — OXYCODONE HYDROCHLORIDE 5 MG/1
5 TABLET ORAL EVERY 6 HOURS PRN
Qty: 20 TABLET | Refills: 0 | Status: SHIPPED | OUTPATIENT
Start: 2023-11-10 | End: 2023-11-10

## 2023-11-10 RX ORDER — FENTANYL CITRATE 50 UG/ML
25-50 INJECTION, SOLUTION INTRAMUSCULAR; INTRAVENOUS
Status: DISCONTINUED | OUTPATIENT
Start: 2023-11-10 | End: 2023-11-10 | Stop reason: HOSPADM

## 2023-11-10 RX ORDER — SODIUM CHLORIDE, SODIUM LACTATE, POTASSIUM CHLORIDE, CALCIUM CHLORIDE 600; 310; 30; 20 MG/100ML; MG/100ML; MG/100ML; MG/100ML
INJECTION, SOLUTION INTRAVENOUS CONTINUOUS
Status: DISCONTINUED | OUTPATIENT
Start: 2023-11-10 | End: 2023-11-10 | Stop reason: HOSPADM

## 2023-11-10 RX ORDER — PROPOFOL 10 MG/ML
INJECTION, EMULSION INTRAVENOUS PRN
Status: DISCONTINUED | OUTPATIENT
Start: 2023-11-10 | End: 2023-11-10

## 2023-11-10 RX ORDER — OXYCODONE HYDROCHLORIDE 5 MG/1
5 TABLET ORAL
Status: COMPLETED | OUTPATIENT
Start: 2023-11-10 | End: 2023-11-10

## 2023-11-10 RX ORDER — HYDROMORPHONE HCL IN WATER/PF 6 MG/30 ML
0.4 PATIENT CONTROLLED ANALGESIA SYRINGE INTRAVENOUS EVERY 5 MIN PRN
Status: DISCONTINUED | OUTPATIENT
Start: 2023-11-10 | End: 2023-11-10 | Stop reason: HOSPADM

## 2023-11-10 RX ORDER — FLUMAZENIL 0.1 MG/ML
0.2 INJECTION, SOLUTION INTRAVENOUS
Status: DISCONTINUED | OUTPATIENT
Start: 2023-11-10 | End: 2023-11-10 | Stop reason: HOSPADM

## 2023-11-10 RX ADMIN — FENTANYL CITRATE 50 MCG: 50 INJECTION INTRAMUSCULAR; INTRAVENOUS at 08:18

## 2023-11-10 RX ADMIN — MIDAZOLAM 1 MG: 1 INJECTION INTRAMUSCULAR; INTRAVENOUS at 07:39

## 2023-11-10 RX ADMIN — ONDANSETRON 4 MG: 2 INJECTION INTRAMUSCULAR; INTRAVENOUS at 11:23

## 2023-11-10 RX ADMIN — FENTANYL CITRATE 50 MCG: 50 INJECTION INTRAMUSCULAR; INTRAVENOUS at 07:18

## 2023-11-10 RX ADMIN — Medication 50 MG: at 07:45

## 2023-11-10 RX ADMIN — FENTANYL CITRATE 50 MCG: 50 INJECTION, SOLUTION INTRAMUSCULAR; INTRAVENOUS at 11:10

## 2023-11-10 RX ADMIN — OXYCODONE HYDROCHLORIDE 5 MG: 5 TABLET ORAL at 12:45

## 2023-11-10 RX ADMIN — FENTANYL CITRATE 100 MCG: 50 INJECTION INTRAMUSCULAR; INTRAVENOUS at 07:45

## 2023-11-10 RX ADMIN — Medication 20 MG: at 09:59

## 2023-11-10 RX ADMIN — PHENYLEPHRINE HYDROCHLORIDE 100 MCG: 10 INJECTION INTRAVENOUS at 09:29

## 2023-11-10 RX ADMIN — ONDANSETRON 4 MG: 2 INJECTION INTRAMUSCULAR; INTRAVENOUS at 08:10

## 2023-11-10 RX ADMIN — PHENYLEPHRINE HYDROCHLORIDE 100 MCG: 10 INJECTION INTRAVENOUS at 09:15

## 2023-11-10 RX ADMIN — FENTANYL CITRATE 50 MCG: 50 INJECTION INTRAMUSCULAR; INTRAVENOUS at 09:04

## 2023-11-10 RX ADMIN — MIDAZOLAM 1 MG: 1 INJECTION INTRAMUSCULAR; INTRAVENOUS at 07:19

## 2023-11-10 RX ADMIN — PROPOFOL 200 MG: 10 INJECTION, EMULSION INTRAVENOUS at 07:45

## 2023-11-10 RX ADMIN — Medication 2 G: at 07:56

## 2023-11-10 RX ADMIN — BUPIVACAINE HYDROCHLORIDE 20 ML: 2.5 INJECTION, SOLUTION EPIDURAL; INFILTRATION; INTRACAUDAL; PERINEURAL at 07:00

## 2023-11-10 RX ADMIN — PROPOFOL 30 MG: 10 INJECTION, EMULSION INTRAVENOUS at 09:02

## 2023-11-10 RX ADMIN — ONDANSETRON 4 MG: 4 TABLET, ORALLY DISINTEGRATING ORAL at 15:08

## 2023-11-10 RX ADMIN — HYDROMORPHONE HYDROCHLORIDE 0.4 MG: 0.2 INJECTION, SOLUTION INTRAMUSCULAR; INTRAVENOUS; SUBCUTANEOUS at 11:43

## 2023-11-10 RX ADMIN — Medication 30 MG: at 09:02

## 2023-11-10 RX ADMIN — SODIUM CHLORIDE, POTASSIUM CHLORIDE, SODIUM LACTATE AND CALCIUM CHLORIDE: 600; 310; 30; 20 INJECTION, SOLUTION INTRAVENOUS at 07:35

## 2023-11-10 RX ADMIN — Medication 10 MG: at 08:28

## 2023-11-10 RX ADMIN — PHENYLEPHRINE HYDROCHLORIDE 100 MCG: 10 INJECTION INTRAVENOUS at 09:40

## 2023-11-10 RX ADMIN — LIDOCAINE HYDROCHLORIDE 100 MG: 20 INJECTION, SOLUTION INFILTRATION; PERINEURAL at 07:45

## 2023-11-10 RX ADMIN — BUPIVACAINE 20 ML: 13.3 INJECTION, SUSPENSION, LIPOSOMAL INFILTRATION at 07:00

## 2023-11-10 RX ADMIN — HYDROMORPHONE HYDROCHLORIDE 0.2 MG: 0.2 INJECTION, SOLUTION INTRAMUSCULAR; INTRAVENOUS; SUBCUTANEOUS at 12:14

## 2023-11-10 RX ADMIN — HYDROMORPHONE HYDROCHLORIDE 0.2 MG: 0.2 INJECTION, SOLUTION INTRAMUSCULAR; INTRAVENOUS; SUBCUTANEOUS at 12:35

## 2023-11-10 RX ADMIN — Medication 10 MG: at 09:00

## 2023-11-10 RX ADMIN — DEXAMETHASONE SODIUM PHOSPHATE 4 MG: 4 INJECTION, SOLUTION INTRA-ARTICULAR; INTRALESIONAL; INTRAMUSCULAR; INTRAVENOUS; SOFT TISSUE at 08:10

## 2023-11-10 RX ADMIN — HEPARIN SODIUM 5000 UNITS: 5000 INJECTION, SOLUTION INTRAVENOUS; SUBCUTANEOUS at 07:25

## 2023-11-10 RX ADMIN — SCOPALAMINE 1 PATCH: 1 PATCH, EXTENDED RELEASE TRANSDERMAL at 07:26

## 2023-11-10 RX ADMIN — FENTANYL CITRATE 50 MCG: 50 INJECTION, SOLUTION INTRAMUSCULAR; INTRAVENOUS at 11:25

## 2023-11-10 RX ADMIN — SODIUM CHLORIDE, POTASSIUM CHLORIDE, SODIUM LACTATE AND CALCIUM CHLORIDE: 600; 310; 30; 20 INJECTION, SOLUTION INTRAVENOUS at 08:30

## 2023-11-10 RX ADMIN — SUGAMMADEX 200 MG: 100 INJECTION, SOLUTION INTRAVENOUS at 10:26

## 2023-11-10 ASSESSMENT — ACTIVITIES OF DAILY LIVING (ADL)
ADLS_ACUITY_SCORE: 35

## 2023-11-10 NOTE — ANESTHESIA PROCEDURE NOTES
Airway       Patient location during procedure: OR       Procedure Start/Stop Times: 11/10/2023 7:55 AM  Staff -        CRNA: Mag Lujan APRN CRNA       Performed By: CRNA  Consent for Airway        Urgency: elective  Indications and Patient Condition       Indications for airway management: sumit-procedural       Induction type:intravenous       Mask difficulty assessment: 1 - vent by mask    Final Airway Details       Final airway type: endotracheal airway       Successful airway: ETT - single  Endotracheal Airway Details        ETT size (mm): 7.0       Cuffed: yes       Successful intubation technique: direct laryngoscopy       DL Blade Type: Herron 2       Grade View of Cords: 1       Adjucts: stylet       Position: Center       Measured from: gums/teeth       Secured at (cm): 22       Bite block used: None    Post intubation assessment        Placement verified by: capnometry, equal breath sounds and chest rise        Number of attempts at approach: 1       Secured with: silk tape       Ease of procedure: easy       Dentition: Unchanged    Medication(s) Administered   Medication Administration Time: 11/10/2023 7:55 AM

## 2023-11-10 NOTE — ANESTHESIA POSTPROCEDURE EVALUATION
Patient: Zora López    Procedure: Procedure(s):  HERNIORRHAPHY, INCISIONAL, OPEN WITH PHASIX ST MESH, 9x7 cm       Anesthesia Type:  General    Note:  Disposition: Outpatient   Postop Pain Control: Uneventful            Sign Out: Well controlled pain   PONV: No   Neuro/Psych: Uneventful            Sign Out: Acceptable/Baseline neuro status   Airway/Respiratory: Uneventful            Sign Out: Acceptable/Baseline resp. status   CV/Hemodynamics: Uneventful            Sign Out: Acceptable CV status; No obvious hypovolemia; No obvious fluid overload   Other NRE: NONE   DID A NON-ROUTINE EVENT OCCUR? No           Last vitals:  Vitals Value Taken Time   /81 11/10/23 1100   Temp     Pulse 82 11/10/23 1113   Resp 13 11/10/23 1113   SpO2 92 % 11/10/23 1113   Vitals shown include unfiled device data.    Electronically Signed By: Florentino Arriola MD  November 10, 2023  11:14 AM

## 2023-11-10 NOTE — OP NOTE
Transplant Surgery  Operative Note    Preop Dx:  Incisional hernia  Postop Dx: Same  Procedure: HERNIORRHAPHY, INCISIONAL, OPEN WITH PHASIX ST MESH, 9x7 cm   Surgeon: Dr. Lynn  ASSISTANT:  Osvaldo Higuera MD, . There was no qualified general surgery resident available to assist during this procedure.   Anesthesia: General  EBL: 10 ml  Fluids: n/a  UO: n/a  Drains: none  Specimen: none.  Complications: None  Findings:  9x7cm incisional hernia  Complications: None.    Indication: The patient has an incisional hernia following organ donation  After discussing the risks and benefits of surgery and potential complications, the patient provided informed consent.     DETAILS OF PROCEDURE:  The patient was brought to the operating room, placed in a supine position.  Perioperative prophylactic IV antibiotics were given.  Anesthesia was adminisitered. The abdomen was prepped and draped in the usual sterile fashion.  Time out was performed.    A 5cm incision was made overlying the previous scar. The subcutaneous tissue was divided using bovie cautery. The hernia sac was encountered. We largely were able to reduce the hernia while keeping the peritoneum in intact. A phasix ST mesh that was 15x20 cm was selected for the defect that was noted to be 9x7cm. This was secured into place with 0 prolene suture. The fascia was then closed using #1 prolene suture. The subcutaneous tissue was closed with 3-0 vicryl. Skin closed with 4-0 monocryl and skin glue.     All needle, sponge, and instrument counts were accurate.  The patient tolerated the procedure well without apparent complications and was trasfered to the PACU in good condition.  Faculty was present for critical portions of the procedure.    Physician Attestation   I was present for the key portions of the procedure and I was immediately available for the entire procedure between opening and closing.    Florence King MD, MD  Date of Service (when I saw the  patient): 11/10/2023  The transplant fellow, Osvaldo Higuera MD, was present and assisted with all aspects of the operation described above from opening to closing.  There was no suitable surgical resident available to assist in this procedure.

## 2023-11-10 NOTE — DISCHARGE INSTRUCTIONS
[facility]  Same-Day Surgery   Adult Discharge Orders & Instructions     For 24 hours after surgery    Get plenty of rest.  A responsible adult must stay with you for at least 24 hours after you leave the hospital.   Do not drive or use heavy equipment.  If you have weakness or tingling, don't drive or use heavy equipment until this feeling goes away.  Do not drink alcohol.  Avoid strenuous or risky activities.  Ask for help when climbing stairs.   You may feel lightheaded.  IF so, sit for a few minutes before standing.  Have someone help you get up.   If you have nausea (feel sick to your stomach): Drink only clear liquids such as apple juice, ginger ale, broth or 7-Up.  Rest may also help.  Be sure to drink enough fluids.  Move to a regular diet as you feel able.  You may have a slight fever. Call the doctor if your fever is over 100 F (37.7 C) (taken under the tongue) or lasts longer than 24 hours.  You may have a dry mouth, a sore throat, muscle aches or trouble sleeping.  These should go away after 24 hours.  Do not make important or legal decisions.   Call your doctor for any of the followin.  Signs of infection (fever, growing tenderness at the surgery site, a large amount of drainage or bleeding, severe pain, foul-smelling drainage, redness, swelling).    2. It has been over 8 to 10 hours since surgery and you are still not able to urinate (pass water).    3.  Headache for over 24 hours.    4.  Numbness, tingling or weakness the day after surgery (if you had spinal anesthesia).  To contact a doctor, call Florence King MD or:    '   [phone] and ask for the resident on call for Dr. Florence King  (answered 24 hours a day)  '   Emergency Department:    [facility]: [phone]       (TTY for hearing impaired: [phone])    [facility]: [phone]       (TTY for hearing impaired: [phone])       Hernia Repair: What to Expect  at Home  Your Recovery  You are likely to have pain for the next few days. You may also feel tired and have less energy than normal. This is common.  You should feel better after a few days and will probably feel much better in 7 days.  For several weeks you may feel discomfort or pulling in the hernia repair when you move. You may have some bruising near the repair site and on your genitals. This is normal. If you have swelling in the genitals, you may be told to wear well-fitting briefs. Sky[employer]otic bicycle shorts may also provide good support.  This care sheet gives you a general idea about how long it will take for you to recover. But each person recovers at a different pace. Follow the steps below to get better as quickly as possible.  How can you care for yourself at home?  Activity    Rest when you feel tired. Getting enough sleep will help you recover.     Try to walk each day. Start by walking a little more than you did the day before. Bit by bit, increase the amount you walk. Walking boosts blood flow and helps prevent pneumonia and constipation.     Avoid strenuous activities, such as biking, jogging, weight lifting, or aerobic exercise, until your doctor says it is okay.     Avoid lifting anything that would make you strain. This may include heavy grocery bags and milk containers, a heavy briefcase or backpack, cat litter or dog food bags, a vacuum , or a child.     You may drive when you are no longer taking pain medicine and can quickly move your foot from the gas pedal to the brake. You must also be able to sit comfortably for a long period of time, even if you do not plan to go far. You might get caught in traffic.     Most people are able to return to work within 1 to 2 weeks after surgery.     You may shower 24 to 48 hours after surgery, if your doctor okays it. Pat the cut (incision) dry. Do not take a bath for the first 2 weeks, or until your doctor tells you it is okay.     Your doctor will  tell you when you can have sex again.   Diet    You can eat your normal diet. If your stomach is upset, try bland, low-fat foods like plain rice, broiled chicken, toast, and yogurt.     Drink plenty of fluids (unless your doctor tells you not to).     You may notice that your bowel movements are not regular right after your surgery. This is common. Avoid constipation and straining with bowel movements. You may want to take a fiber supplement every day. If you have not had a bowel movement after a couple of days, ask your doctor about taking a mild laxative.   Medicines    Your doctor will tell you if and when you can restart your medicines. He or she will also give you instructions about taking any new medicines.     If you stopped taking aspirin or some other blood thinner, your doctor will tell you when to start taking it again.     Be safe with medicines. Take pain medicines exactly as directed.  If the doctor gave you a prescription medicine for pain, take it as prescribed.  If you are not taking a prescription pain medicine, take an over-the-counter medicine such as acetaminophen (Tylenol), ibuprofen (Advil, Motrin), or naproxen (Aleve). Read and follow all instructions on the label.  Do not take two or more pain medicines at the same time unless the doctor told you to. Many pain medicines have acetaminophen, which is Tylenol. Too much acetaminophen (Tylenol) can be harmful.     If your doctor prescribed antibiotics, take them as directed. Do not stop taking them just because you feel better. You need to take the full course of antibiotics.     If you think your pain medicine is making you sick to your stomach:  Take your medicine after meals (unless your doctor has told you not to).  Ask your doctor for a different pain medicine.   Incision care              Wash the area daily with warm, soapy water and pat it dry.   Follow-up care is a key part of your treatment and safety. Be sure to make and go to all  "appointments, and call your doctor if you are having problems. It's also a good idea to know your test results and keep a list of the medicines you take.  When should you call for help?   Call 911 anytime you think you may need emergency care. For example, call if:    You passed out (lost consciousness).     You are short of breath.   Call your doctor now or seek immediate medical care if:    You have pain that does not get better after you take pain medicine.     You are sick to your stomach and cannot keep fluids down.     You have signs of a blood clot in your leg (called a deep vein thrombosis), such as:  Pain in your calf, back of the knee, thigh, or groin.  Redness and swelling in your leg or groin.     You cannot pass stools or gas.     Bright red blood has soaked through the bandage over your incision.     You have loose stitches, or your incision comes open.     You have signs of infection, such as:  Increased pain, swelling, warmth, or redness.  Red streaks leading from the incision.  Pus draining from the incision.  A fever.   Watch closely for any changes in your health, and be sure to contact your doctor if you have any problems.  Where can you learn more?  Go to https://www.[employer].net/patiented  Enter G367 in the search box to learn more about \"Hernia Repair: What to Expect at Home.\"  Current as of: November 29, 2022               Content Version: 13.8    4671-6680 [employer].   Care instructions adapted under license by your healthcare professional. If you have questions about a medical condition or this instruction, always ask your healthcare professional. [employer] disclaims any warranty or liability for your use of this information.      "

## 2023-11-10 NOTE — ANESTHESIA CARE TRANSFER NOTE
Patient: Zora López    Procedure: Procedure(s):  HERNIORRHAPHY, INCISIONAL, OPEN WITH PHASIX ST MESH, 9x7 cm       Diagnosis: Hernia, incisional [K43.2]  Diagnosis Additional Information: No value filed.    Anesthesia Type:   General     Note:    Oropharynx: oropharynx clear of all foreign objects and spontaneously breathing  Level of Consciousness: awake  Oxygen Supplementation: nasal cannula  Level of Supplemental Oxygen (L/min / FiO2): 2  Independent Airway: airway patency satisfactory and stable  Dentition: dentition unchanged  Vital Signs Stable: post-procedure vital signs reviewed and stable  Report to RN Given: handoff report given  Patient transferred to: PACU    Handoff Report: Identifed the Patient, Identified the Reponsible Provider, Reviewed the pertinent medical history, Discussed the surgical course, Reviewed Intra-OP anesthesia mangement and issues during anesthesia, Set expectations for post-procedure period and Allowed opportunity for questions and acknowledgement of understanding      Vitals:  Vitals Value Taken Time   /67 11/10/23 1050   Temp     Pulse 91 11/10/23 1052   Resp 16 11/10/23 1052   SpO2 97 % 11/10/23 1052   Vitals shown include unfiled device data.    Electronically Signed By: CHIKA Cain CRNA  November 10, 2023  10:54 AM

## 2023-11-10 NOTE — ANESTHESIA PROCEDURE NOTES
TAP Procedure Note    Pre-Procedure   Staff -        Anesthesiologist:  Jacinto Quiles MD       Resident/Fellow: Jacinto Simeon MD       Performed By: resident       Location: pre-op       Procedure Start/Stop Times: 11/10/2023 7:00 AM and 11/10/2023 7:20 AM       Pre-Anesthestic Checklist: patient identified, IV checked, site marked, risks and benefits discussed, informed consent, monitors and equipment checked, pre-op evaluation, at physician/surgeon's request and post-op pain management  Timeout:       Correct Patient: Yes        Correct Procedure: Yes        Correct Site: Yes        Correct Position: Yes        Correct Laterality: Yes        Site Marked: N/A  Procedure Documentation  Procedure: TAP       Diagnosis: POST-OPERATIVE PAIN CONTROL       Laterality: bilateral       Patient Position: supine       Patient Prep/Sterile Barriers: sterile gloves, mask       Skin prep: Chloraprep       Needle Type: short bevel       Needle Gauge: 21.        Needle Length (millimeters): 110        Ultrasound guided       1. Ultrasound was used to identify targeted nerve, plexus, vascular marker, or fascial plane and place a needle adjacent to it in real-time.       2. Ultrasound was used to visualize the spread of anesthetic in close proximity to the above referenced structure.       3. A permanent image is entered into the patient's record.    Assessment/Narrative         The placement was negative for: blood aspirated, painful injection and site bleeding       Paresthesias: No.       Bolus given via needle..        Secured via.        Insertion/Infusion Method: Single Shot       Complications: none       Injection made incrementally with aspirations every 5 mL.    Medication(s) Administered   Bupivacaine 0.25% PF (Infiltration) - Infiltration   20 mL - 11/10/2023 7:00:00 AM  Bupivacaine liposome (Exparel) 1.3% LA inj susp (Infiltration) - Infiltration   20 mL - 11/10/2023 7:00:00 AM  Medication Administration Time:  "11/10/2023 7:00 AM      FOR [facility] ONLY:   Pain Team Contact information: please page the Pain Team [address]. Search \"Pain\". During daytime hours, please page the attending first. At night please page the resident first.      "

## 2023-11-10 NOTE — OR NURSING
TAP block performed without complications.  Patient received 1 mg Versed and 50 mcg Fentanyl. VSS.  Patient tolerated well.

## 2023-11-10 NOTE — BRIEF OP NOTE
[facility] Brief Operative Note    Pre-operative diagnosis: Hernia, incisional [K43.2]   Post-operative diagnosis Same   Procedure: Procedure(s):  HERNIORRHAPHY, INCISIONAL, OPEN WITH PHASIX ST MESH, 9x7 cm   Surgeon(s): Surgeon(s) and Role:     * Florence King MD - Primary     * Kamran Hays MD - Assisting     * Osvaldo Higuera MD - Assisting   Estimated blood loss: * No values recorded between 11/10/2023  8:17 AM and 11/10/2023 10:31 AM *    Specimens: * No specimens in log *   Findings: 9x6cm hernia with placement of [id number] phasix ST mesh

## 2023-11-13 ENCOUNTER — TELEPHONE (OUTPATIENT)
Dept: TRANSPLANT | Facility: CLINIC | Age: 48
End: 2023-11-13
Payer: COMMERCIAL

## 2023-11-13 DIAGNOSIS — Z52.4 KIDNEY DONOR: Primary | ICD-10-CM

## 2023-11-13 NOTE — TELEPHONE ENCOUNTER
Post-hospital outreach call made to check in post hernia repair.     Zora reports overall feeling good.    Incision:CDI, no redness, swelling or drainage.  Pain: minimal   Bowels: passing gas, no BM for a few days, doesn't feel constipated, planning to increase prn bowel medications today   Appetite: fine, no nausea/vomiting  Fluids: taking adequate fluids   Urinary: no issue  Activity: tolerating activity, importance of alternating activity and rest throughout the day.     Reviewed: abdominal precautions, lifting restrictions reviewed    Next appointment: post op visit with Dr. King requested     Zora knows how to get in touch with me or an on call coordinator with any questions or concerns.

## 2023-11-16 ENCOUNTER — TELEPHONE (OUTPATIENT)
Dept: TRANSPLANT | Facility: CLINIC | Age: 48
End: 2023-11-16
Payer: COMMERCIAL

## 2023-11-16 NOTE — TELEPHONE ENCOUNTER
ASYA spoke to [facility]'s MH therapist, Joan Muñoz, in response to her request for donor team to be billed for further MH therapy, as it previously was approved for 3 visits before donor's own insurance began being billed. ASYA requested the clinical documentation relating the MH treatment directly to living donation be sent for team's review. Joan plans to get a MYRA from [facility] to do this, then send.     Daphney Estrada, [facility], CCTSW   Independent Living Donor Advocate  [facility], [facility], [facility]  Direct: [phone]  E-Mail: [email or web address]

## 2023-11-20 ENCOUNTER — TELEPHONE (OUTPATIENT)
Dept: TRANSPLANT | Facility: CLINIC | Age: 48
End: 2023-11-20
Payer: COMMERCIAL

## 2023-11-20 NOTE — TELEPHONE ENCOUNTER
Called Zora to request further documentation to get wage reimbursement approved for donor shield. [facility].     Daphney Estrada, [facility], CCTSW   Independent Living Donor Advocate  [facility], [facility], [facility]  Direct: [phone]  E-Mail: [email or web address]

## 2023-11-21 ENCOUNTER — OFFICE VISIT (OUTPATIENT)
Dept: TRANSPLANT | Facility: CLINIC | Age: 48
End: 2023-11-21
Attending: SURGERY
Payer: COMMERCIAL

## 2023-11-21 VITALS
OXYGEN SATURATION: 99 % | DIASTOLIC BLOOD PRESSURE: 101 MMHG | HEART RATE: 95 BPM | BODY MASS INDEX: 34.19 KG/M2 | SYSTOLIC BLOOD PRESSURE: 167 MMHG | WEIGHT: 218.3 LBS

## 2023-11-21 DIAGNOSIS — Z52.4 KIDNEY DONOR: ICD-10-CM

## 2023-11-21 PROCEDURE — 99024 POSTOP FOLLOW-UP VISIT: CPT | Performed by: NURSE PRACTITIONER

## 2023-11-21 PROCEDURE — 99213 OFFICE O/P EST LOW 20 MIN: CPT | Performed by: NURSE PRACTITIONER

## 2023-11-21 NOTE — LETTER
11/21/2023         RE: Zora López  [address] [place] [address]        Dear Colleague,    Thank you for referring your patient, Zora López, to the [facility]. Please see a copy of my visit note below.    Transplant Surgery Progress Note     Medical record number: [id number]  YOB: 1975,   Date of Visit:  11/21/2023  For followup after hernia repair     Assessment and Recommendations: Ms. López is doing well after hernia repair on 11/10/23.       1. Lifting restrictions: 10 lbs until 8 weeks   2. Followup: as needed. Reviewed s/s infection - RTC for this.   3. Return to work to be determined per patient's progress, expected between 6-8 weeks after surgery.  Case discussed with Dr. King who also saw patient.       Total time: 15 minutes  Counselling time: 10 minutes    CHIKA Berry       ------------------------------------------------------------------------------------------    S: Ms. López is s/p open incisional herniorrhaphy with mesh on 11/10/23 and is doing well, reporting no fevers, chills, dysuria.  She is not taking narcotic analgesia.  She is not constipated.  She is mostly back to routine activities of daily living and is not feeling ready to return to work at this time.      More drainage today noted from top part of incision.    Off all pain meds. Pain manageable.  Eating and drinking ok.   Bowels are normal.     Medications:  Prescription Medications as of 11/21/2023         Rx Number Disp Refills Start End Last Dispensed Date Next Fill Date Owning Pharmacy    levonorgestrel (MIRENA) 52 MG (20 mcg/day) IUD            Sig: by Intrauterine route once    Class: Historical    Route: Intrauterine    venlafaxine (EFFEXOR XR) 37.5 MG 24 hr capsule    6/12/2023        Sig: Take 37.5 mg by mouth daily    Class: Historical    Route: Oral            Exam:   Pulse:  [95] 95  BP: (167)/(101) 167/101  SpO2:  [99 %] 99  %  Appearance: in no apparent distress.   Skin: normal  Head and Neck: Normal, no rashes or jaundice  Abdomen: rounded, No distention, Surgical scars consistent with history, and incision midline intact with surgical glue. Glue slightly peeling above umbilicus with small amount of serosanguineous drainage. Area covered with gauze.   Extremeties: Edema, none  Neuro: without deficit       Labs:   Lab on 10/16/2023   Component Date Value Ref Range Status    INR 10/16/2023 1.02  0.85 - 1.15 Final          Again, thank you for allowing me to participate in the care of your patient.        Sincerely,        CHIKA Berry CNP

## 2023-11-21 NOTE — PROGRESS NOTES
Transplant Surgery Progress Note     Medical record number: [id number]  YOB: 1975,   Date of Visit:  11/21/2023  For followup after hernia repair     Assessment and Recommendations: Ms. López is doing well after hernia repair on 11/10/23.       1. Lifting restrictions: 10 lbs until 8 weeks   2. Followup: as needed. Reviewed s/s infection - RTC for this.   3. Return to work to be determined per patient's progress, expected between 6-8 weeks after surgery.  Case discussed with Dr. King who also saw patient.       Total time: 15 minutes  Counselling time: 10 minutes    CHIKA Berry       ------------------------------------------------------------------------------------------    S: Ms. López is s/p open incisional herniorrhaphy with mesh on 11/10/23 and is doing well, reporting no fevers, chills, dysuria.  She is not taking narcotic analgesia.  She is not constipated.  She is mostly back to routine activities of daily living and is not feeling ready to return to work at this time.      More drainage today noted from top part of incision.    Off all pain meds. Pain manageable.  Eating and drinking ok.   Bowels are normal.     Medications:  Prescription Medications as of 11/21/2023         Rx Number Disp Refills Start End Last Dispensed Date Next Fill Date Owning Pharmacy    levonorgestrel (MIRENA) 52 MG (20 mcg/day) IUD            Sig: by Intrauterine route once    Class: Historical    Route: Intrauterine    venlafaxine (EFFEXOR XR) 37.5 MG 24 hr capsule    6/12/2023        Sig: Take 37.5 mg by mouth daily    Class: Historical    Route: Oral            Exam:   Pulse:  [95] 95  BP: (167)/(101) 167/101  SpO2:  [99 %] 99 %  Appearance: in no apparent distress.   Skin: normal  Head and Neck: Normal, no rashes or jaundice  Abdomen: rounded, No distention, Surgical scars consistent with history, and incision midline intact with surgical glue. Glue slightly peeling above umbilicus with  small amount of serosanguineous drainage. Area covered with gauze.   Extremeties: Edema, none  Neuro: without deficit       Labs:   Lab on 10/16/2023   Component Date Value Ref Range Status    INR 10/16/2023 1.02  0.85 - 1.15 Final

## 2023-12-04 ENCOUNTER — TELEPHONE (OUTPATIENT)
Dept: TRANSPLANT | Facility: CLINIC | Age: 48
End: 2023-12-04
Payer: COMMERCIAL

## 2023-12-04 NOTE — TELEPHONE ENCOUNTER
ASYA received call from donor regarding delay in [employer] Donor [employer] wage reimbursement payment, as hernia surgery was 4 weeks ago. ASYA escalated this concern to management for assistance.     Daphney Estrada, [facility], [facility] Hospital[facility]   Independent Living Donor Advocate  [facility]  Direct: [phone]  E-Mail: [email or web address]

## 2023-12-05 ENCOUNTER — DOCUMENTATION ONLY (OUTPATIENT)
Dept: TRANSPLANT | Facility: CLINIC | Age: 48
End: 2023-12-05
Payer: COMMERCIAL

## 2023-12-05 DIAGNOSIS — Z52.4 KIDNEY DONOR: Primary | ICD-10-CM

## 2024-01-19 ENCOUNTER — LAB (OUTPATIENT)
Dept: LAB | Facility: CLINIC | Age: 49
End: 2024-01-19

## 2024-01-19 DIAGNOSIS — Z52.4 KIDNEY DONOR: ICD-10-CM

## 2024-01-19 LAB
ALBUMIN UR-MCNC: NEGATIVE MG/DL
APPEARANCE UR: CLEAR
BILIRUB UR QL STRIP: NEGATIVE
COLOR UR AUTO: YELLOW
GLUCOSE UR STRIP-MCNC: NEGATIVE MG/DL
HGB UR QL STRIP: NEGATIVE
KETONES UR STRIP-MCNC: NEGATIVE MG/DL
LEUKOCYTE ESTERASE UR QL STRIP: NEGATIVE
NITRATE UR QL: NEGATIVE
PH UR STRIP: 7 [PH] (ref 5–7)
SP GR UR STRIP: 1.01 (ref 1–1.03)
UROBILINOGEN UR STRIP-ACNC: 0.2 E.U./DL

## 2024-01-19 PROCEDURE — 82043 UR ALBUMIN QUANTITATIVE: CPT

## 2024-01-19 PROCEDURE — 84156 ASSAY OF PROTEIN URINE: CPT

## 2024-01-19 PROCEDURE — 36415 COLL VENOUS BLD VENIPUNCTURE: CPT

## 2024-01-19 PROCEDURE — 81003 URINALYSIS AUTO W/O SCOPE: CPT

## 2024-01-19 PROCEDURE — 82565 ASSAY OF CREATININE: CPT

## 2024-01-19 PROCEDURE — 82570 ASSAY OF URINE CREATININE: CPT

## 2024-01-20 LAB
ALBUMIN MFR UR ELPH: <6 MG/DL
CREAT SERPL-MCNC: 1.18 MG/DL (ref 0.51–0.95)
CREAT UR-MCNC: 14.3 MG/DL
CREAT UR-MCNC: 14.3 MG/DL
EGFRCR SERPLBLD CKD-EPI 2021: 57 ML/MIN/1.73M2
MICROALBUMIN UR-MCNC: <12 MG/L
MICROALBUMIN/CREAT UR: NORMAL MG/G{CREAT}
PROT/CREAT 24H UR: NORMAL MG/G{CREAT}

## 2024-06-22 ENCOUNTER — HEALTH MAINTENANCE LETTER (OUTPATIENT)
Age: 49
End: 2024-06-22

## 2025-02-26 ENCOUNTER — DOCUMENTATION ONLY (OUTPATIENT)
Dept: TRANSPLANT | Facility: CLINIC | Age: 50
End: 2025-02-26

## 2025-02-26 NOTE — PROGRESS NOTES
Message received from Zora inquiring about starting a GLP-1 through her PCP.   Above reviewed with Dr. Charanjit Franklin who confirmed okay to start, no specific agent advised, but recommend monitoring for GI side effects that could cause dehydration which could impact her kidney. She recommended having her provider check a BMP 2 weeks after starting and after dose increases.   Dania Woody updated of above. She knows how to reach me with any further questions.

## 2025-07-12 ENCOUNTER — HEALTH MAINTENANCE LETTER (OUTPATIENT)
Age: 50
End: 2025-07-12

## (undated) DEVICE — PREP CHLORAPREP 26ML TINTED ORANGE  260815

## (undated) DEVICE — Device

## (undated) DEVICE — ESU GROUND PAD ADULT W/CORD E7507

## (undated) DEVICE — LINEN TOWEL PACK X6 WHITE 5487

## (undated) DEVICE — DRAPE IOBAN INCISE 23X17" 6650EZ

## (undated) DEVICE — SU SILK 2-0 TIE 12X30" A305H

## (undated) DEVICE — ESU HARMONIC LAPAROSCOPIC SHEARS HD 1000I 36CM HARHD36

## (undated) DEVICE — DEVICE SUTURE PASSER 14GA WECK EFX EFXSP2

## (undated) DEVICE — GOWN IMPERVIOUS BREATHABLE SMART LG 89015

## (undated) DEVICE — SU SILK 4-0 TIE 12X30" A303H

## (undated) DEVICE — WIPES FOLEY CARE SURESTEP PROVON DFC100

## (undated) DEVICE — SOL WATER IRRIG 1000ML BOTTLE 2F7114

## (undated) DEVICE — BLADE CLIPPER SGL USE 9680

## (undated) DEVICE — SURGICEL ABSORBABLE HEMOSTAT SNOW 4"X4" 2083

## (undated) DEVICE — CATH TRAY FOLEY SURESTEP 16FR W/URNE MTR STLK LATEX A303316A

## (undated) DEVICE — GLOVE PROTEXIS BLUE W/NEU-THERA 6.5  2D73EB65

## (undated) DEVICE — ESU ENDO SCISSORS 5MM CVD 5DCS

## (undated) DEVICE — CANNULA VESSEL DLP  30001

## (undated) DEVICE — ENDO TROCAR FIRST ENTRY KII FIOS Z-THRD 05X100MM CTF03

## (undated) DEVICE — STPL POWERED ECHELON VASC 35MM PVE35A

## (undated) DEVICE — ENDO TROCAR SLEEVE KII Z-THREADED 12X100MM CTS22

## (undated) DEVICE — SUCTION MANIFOLD NEPTUNE 2 SYS 4 PORT 0702-020-000

## (undated) DEVICE — DRAPE SLUSH/WARMER 66X44" ORS-320

## (undated) DEVICE — LINEN TOWEL PACK X30 5481

## (undated) DEVICE — TUBING IRRIG CYSTO/BLADDER SET 81" LF 2C4040

## (undated) DEVICE — PREP CHLORAPREP 26ML TINTED HI-LITE ORANGE 930815

## (undated) DEVICE — ESU PENCIL SMOKE EVAC W/ROCKER SWITCH 0703-047-000

## (undated) DEVICE — SOL ADH LIQUID BENZOIN SWAB 0.6ML C1544

## (undated) DEVICE — SU PROLENE 1 CTX 30" 8455H

## (undated) DEVICE — ANTIFOG SOLUTION W/FOAM PAD 31142527

## (undated) DEVICE — SOL NACL 0.9% IRRIG 1000ML BOTTLE 2F7124

## (undated) DEVICE — SU MONOCRYL 4-0 PS-2 27" UND Y426H

## (undated) DEVICE — DRAPE ISOLATION BAG 1003

## (undated) DEVICE — DRSG STERI STRIP 1/2X4" R1547

## (undated) DEVICE — SU PDS II 0 TP-1 60" Z991G

## (undated) DEVICE — SPONGE LAP 12X12" X8425

## (undated) DEVICE — ENDO TROCAR FIRST ENTRY KII FIOS Z-THRD 12X100MM CTF73

## (undated) DEVICE — SU SILK 3-0 TIE 12X30" A304H

## (undated) DEVICE — SU VICRYL 0 TIE 54" J608H

## (undated) DEVICE — SU VICRYL 3-0 SH 27" J316H

## (undated) DEVICE — ENDO SCOPE WARMER SEAL  C3101

## (undated) DEVICE — TUBING C02 INSUFFLATION LAP FILTER HEATER 6198

## (undated) DEVICE — GLOVE BIOGEL PI ULTRATOUCH SZ 6.5 41165

## (undated) DEVICE — SU DERMABOND ADVANCED .7ML DNX12

## (undated) DEVICE — LINEN TOWEL PACK X5 5464

## (undated) DEVICE — SU VICRYL 3-0 SH 27" UND J416H

## (undated) DEVICE — DRAPE SHEET REV FOLD 3/4 9349

## (undated) DEVICE — SU PROLENE 0 CT-1 30" 8424H

## (undated) RX ORDER — HEPARIN SODIUM 5000 [USP'U]/.5ML
INJECTION, SOLUTION INTRAVENOUS; SUBCUTANEOUS
Status: DISPENSED
Start: 2023-11-10

## (undated) RX ORDER — FENTANYL CITRATE 50 UG/ML
INJECTION, SOLUTION INTRAMUSCULAR; INTRAVENOUS
Status: DISPENSED
Start: 2022-02-03

## (undated) RX ORDER — FENTANYL CITRATE 50 UG/ML
INJECTION, SOLUTION INTRAMUSCULAR; INTRAVENOUS
Status: DISPENSED
Start: 2023-11-10

## (undated) RX ORDER — HEPARIN SODIUM 1000 [USP'U]/ML
INJECTION, SOLUTION INTRAVENOUS; SUBCUTANEOUS
Status: DISPENSED
Start: 2022-02-03

## (undated) RX ORDER — OXYCODONE HYDROCHLORIDE 5 MG/1
TABLET ORAL
Status: DISPENSED
Start: 2023-11-10

## (undated) RX ORDER — LIDOCAINE HYDROCHLORIDE 20 MG/ML
INJECTION, SOLUTION EPIDURAL; INFILTRATION; INTRACAUDAL; PERINEURAL
Status: DISPENSED
Start: 2022-02-03

## (undated) RX ORDER — LIDOCAINE HYDROCHLORIDE 10 MG/ML
INJECTION, SOLUTION EPIDURAL; INFILTRATION; INTRACAUDAL; PERINEURAL
Status: DISPENSED
Start: 2022-02-03

## (undated) RX ORDER — PROTAMINE SULFATE 10 MG/ML
INJECTION, SOLUTION INTRAVENOUS
Status: DISPENSED
Start: 2022-02-03

## (undated) RX ORDER — GABAPENTIN 300 MG/1
CAPSULE ORAL
Status: DISPENSED
Start: 2022-02-03

## (undated) RX ORDER — ROCURONIUM BROMIDE 50 MG/5 ML
SYRINGE (ML) INTRAVENOUS
Status: DISPENSED
Start: 2022-02-03

## (undated) RX ORDER — ACETAMINOPHEN 325 MG/1
TABLET ORAL
Status: DISPENSED
Start: 2022-02-03

## (undated) RX ORDER — BUPIVACAINE HYDROCHLORIDE 2.5 MG/ML
INJECTION, SOLUTION EPIDURAL; INFILTRATION; INTRACAUDAL
Status: DISPENSED
Start: 2023-11-10

## (undated) RX ORDER — PROPOFOL 10 MG/ML
INJECTION, EMULSION INTRAVENOUS
Status: DISPENSED
Start: 2022-02-03

## (undated) RX ORDER — SCOLOPAMINE TRANSDERMAL SYSTEM 1 MG/1
PATCH, EXTENDED RELEASE TRANSDERMAL
Status: DISPENSED
Start: 2023-11-10

## (undated) RX ORDER — ONDANSETRON 4 MG/1
TABLET, ORALLY DISINTEGRATING ORAL
Status: DISPENSED
Start: 2023-11-10

## (undated) RX ORDER — GLYCOPYRROLATE 0.2 MG/ML
INJECTION, SOLUTION INTRAMUSCULAR; INTRAVENOUS
Status: DISPENSED
Start: 2022-02-03

## (undated) RX ORDER — ONDANSETRON 2 MG/ML
INJECTION INTRAMUSCULAR; INTRAVENOUS
Status: DISPENSED
Start: 2022-02-03

## (undated) RX ORDER — BUPIVACAINE HYDROCHLORIDE 5 MG/ML
INJECTION, SOLUTION EPIDURAL; INTRACAUDAL
Status: DISPENSED
Start: 2022-02-03

## (undated) RX ORDER — CEFUROXIME SODIUM 1.5 G/16ML
INJECTION, POWDER, FOR SOLUTION INTRAVENOUS
Status: DISPENSED
Start: 2022-02-03

## (undated) RX ORDER — MAGNESIUM SULFATE HEPTAHYDRATE 40 MG/ML
INJECTION, SOLUTION INTRAVENOUS
Status: DISPENSED
Start: 2022-02-03

## (undated) RX ORDER — HYDROMORPHONE HCL IN WATER/PF 6 MG/30 ML
PATIENT CONTROLLED ANALGESIA SYRINGE INTRAVENOUS
Status: DISPENSED
Start: 2023-11-10

## (undated) RX ORDER — BUPIVACAINE HYDROCHLORIDE 2.5 MG/ML
INJECTION, SOLUTION EPIDURAL; INFILTRATION; INTRACAUDAL
Status: DISPENSED
Start: 2022-02-03

## (undated) RX ORDER — FUROSEMIDE 10 MG/ML
INJECTION INTRAMUSCULAR; INTRAVENOUS
Status: DISPENSED
Start: 2022-02-03

## (undated) RX ORDER — HEPARIN SODIUM 5000 [USP'U]/.5ML
INJECTION, SOLUTION INTRAVENOUS; SUBCUTANEOUS
Status: DISPENSED
Start: 2022-02-03

## (undated) RX ORDER — DEXAMETHASONE SODIUM PHOSPHATE 4 MG/ML
INJECTION, SOLUTION INTRA-ARTICULAR; INTRALESIONAL; INTRAMUSCULAR; INTRAVENOUS; SOFT TISSUE
Status: DISPENSED
Start: 2022-02-03

## (undated) RX ORDER — PROPOFOL 10 MG/ML
INJECTION, EMULSION INTRAVENOUS
Status: DISPENSED
Start: 2023-11-10

## (undated) RX ORDER — DEXTROSE, SODIUM CHLORIDE, SODIUM LACTATE, POTASSIUM CHLORIDE, AND CALCIUM CHLORIDE 5; .6; .31; .03; .02 G/100ML; G/100ML; G/100ML; G/100ML; G/100ML
INJECTION, SOLUTION INTRAVENOUS
Status: DISPENSED
Start: 2022-02-03

## (undated) RX ORDER — CEFAZOLIN SODIUM/WATER 2 G/20 ML
SYRINGE (ML) INTRAVENOUS
Status: DISPENSED
Start: 2023-11-10

## (undated) RX ORDER — ONDANSETRON 2 MG/ML
INJECTION INTRAMUSCULAR; INTRAVENOUS
Status: DISPENSED
Start: 2023-11-10

## (undated) RX ORDER — CARDIOPLEG/ORGAN PRESERV NO.1 9-198-2-1
BOTTLE PERFUSION
Status: DISPENSED
Start: 2022-02-03